# Patient Record
Sex: MALE | Employment: OTHER | URBAN - METROPOLITAN AREA
[De-identification: names, ages, dates, MRNs, and addresses within clinical notes are randomized per-mention and may not be internally consistent; named-entity substitution may affect disease eponyms.]

---

## 2019-02-26 ENCOUNTER — OFFICE VISIT (OUTPATIENT)
Dept: URGENT CARE | Facility: CLINIC | Age: 69
End: 2019-02-26
Payer: MEDICARE

## 2019-02-26 VITALS
WEIGHT: 165 LBS | HEART RATE: 53 BPM | BODY MASS INDEX: 23.62 KG/M2 | TEMPERATURE: 98 F | OXYGEN SATURATION: 97 % | HEIGHT: 70 IN

## 2019-02-26 DIAGNOSIS — Z51.89 VISIT FOR WOUND CHECK: Primary | ICD-10-CM

## 2019-02-26 PROCEDURE — 99204 OFFICE O/P NEW MOD 45 MIN: CPT | Mod: S$GLB,,, | Performed by: PHYSICIAN ASSISTANT

## 2019-02-26 PROCEDURE — 99204 PR OFFICE/OUTPT VISIT, NEW, LEVL IV, 45-59 MIN: ICD-10-PCS | Mod: S$GLB,,, | Performed by: PHYSICIAN ASSISTANT

## 2019-02-26 RX ORDER — LOSARTAN POTASSIUM 50 MG/1
50 TABLET ORAL
COMMUNITY
Start: 2014-12-05 | End: 2019-02-26

## 2019-02-26 RX ORDER — PANTOPRAZOLE SODIUM 40 MG/1
TABLET, DELAYED RELEASE ORAL
Refills: 0 | COMMUNITY
Start: 2019-02-13

## 2019-02-26 RX ORDER — METOPROLOL SUCCINATE 25 MG/1
TABLET, EXTENDED RELEASE ORAL
Refills: 3 | COMMUNITY
Start: 2019-01-06

## 2019-02-26 RX ORDER — AMLODIPINE BESYLATE 10 MG/1
10 TABLET ORAL
COMMUNITY
Start: 2014-12-05

## 2019-02-26 RX ORDER — ATORVASTATIN CALCIUM 80 MG/1
TABLET, FILM COATED ORAL
Refills: 1 | COMMUNITY
Start: 2019-01-28

## 2019-02-26 RX ORDER — LISINOPRIL 10 MG/1
TABLET ORAL
Refills: 3 | COMMUNITY
Start: 2019-01-06

## 2019-02-26 RX ORDER — LEVETIRACETAM 500 MG/1
TABLET ORAL
Refills: 0 | COMMUNITY
Start: 2019-02-23
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
## 2019-02-26 NOTE — PATIENT INSTRUCTIONS
Continue current care.  Apply antibiotic ointment twice daily with dressing changes.  Keep area clean and dry; in the evenings when you return home, remove dressing to let area breathe and dry. You will continue to weep from the hematoma.  Be aware of signs of infection such as worsening pain, worsening swelling, redness to your cheek, foul-smelling or purulent drainage from the site, fever.  Return to this ED if any other problems occur.    Typically facial sutures should be removed after 5 days. You can return to this urgent care tomorrow if you would like to have sutures removed before you make the trip home.      Discharge Instructions: Caring for Your Wound  Taking proper care of your wound will help it heal. Your healthcare provider or nurse may show you specifically how to clean and dress the wound and how to tell if the wound is healing normally. This sheet will help you remember those guidelines when you are at home.  Getting ready  · Put pets and children in another room, away from your work area.  · Wash your hands before touching any of your supplies:  ¨ Turn on the water.  ¨ Wet your hands and wrists.  ¨ Use liquid soap from a pump dispenser. Work up a lather.  ¨ Scrub your hands thoroughly.  ¨ Rinse your hands with your fingers pointing toward the drain.  ¨ Dry your hands with a clean cloth or paper towel. Use this towel to turn off the faucet.  ¨ Remember, once you have washed your hands, dont touch anything other than your supplies. Wash your hands again if you touch anything, like furniture or your clothes.  · Clean your work area:  ¨ Clean washable surfaces with soap and water, and dry with a clean cloth or paper towel.  ¨ Wipe surfaces that are not washable (like fabric or wood) so that they are free of dust. Spread a clean cloth or paper towel over your work surface.  ¨ Move away from the clean surface, if you need to cough or sneeze.  · Gather your bandage supplies:  ¨ Gauze dressing or other  bandage material  ¨ Medical tape  ¨ Disposable gloves  · Wash your hands again.   Dressing the wound  · Remove the old dressing:  ¨ Put on disposable gloves if youre dressing a wound for someone else or if your wound is infected.  ¨ Pull gently toward the wound to loosen the tape.  ¨ One layer at a time, gently remove the dressing.  ¨ If you have a drain or tube, be careful not to pull on it.  ¨ Look at the dressing. Make sure that you are seeing a decreasing amount of blood, and that the blood is turning into a clear, man fluid.  ¨ If your wound has stitches, look for loose ones.  ¨ Put the dressing in a plastic bag. Then remove your gloves.  · Inspect the wound. Look for signs that it isn't healing normally. A wound that isnt healing properly may be dark in color or have white streaks.  · Dress the wound:  ¨ Wash your hands again.  ¨ Clean and dress the wound as you were shown by your healthcare provider or nurse.  ¨ If youre dressing a wound for someone else or if your wound is infected, put on a new pair of disposable gloves .  ¨ If you have a drain or tube, be careful not to pull on it.  · Discard any used materials or trash in a plastic bag before placing in a trash can.  Follow-up  Make a follow-up appointment as directed by our staff.  When to call your healthcare provider  Call your healthcare provider right away if you have any of the following:  · Shaking chills or fever above 100.4°F (38°C)  · Bleeding that soaks the dressing  · Stitches that are pulling away from the wound or pulling apart  · Pink fluid weeping from the wound  · Increased drainage from the wound or drainage that is yellow, yellow-green, or smelly  · Increased swelling, pain, or redness in the skin around the wound  · A change in the color or size of the wound  · Increased fatigue  · Loss of appetite   Date Last Reviewed: 8/5/2015  © 6193-9189 [employer]. [address] [place] [address], [place] [address]. All rights reserved.  This information is not intended as a substitute for professional medical care. Always follow your healthcare professional's instructions.        Suture Care    Stitches (sutures) are used to close wounds. Sutures also help stop bleeding and speed healing. To help your wound heal, follow the tips on this handout.  Some sutures need to be removed by a healthcare provider. Others dissolve on their own. Sometimes strips of tape are used. Youll be told what kind of sutures you have.   Keep sutures clean  · Avoid doing things that could cause dirt or sweat to get on your sutures. If needed, cover your sutures with a bandage (dressing) to protect them.  · Dont pick at scabs. They help protect the wound.  · Dont wash the area around your sutures unless your healthcare provider says its OK. Then, follow his or her instructions for washing and drying.  Keep sutures dry  · Keep your sutures out of water.  · Take a sponge bath to avoid getting your sutures wound wet, unless your healthcare provider tells you otherwise.  · Ask your provider when can you take a shower or bathe.  · Ask your provider about the best way to keep your sutures dry when bathing or showering.  · If sutures get damp, pat them dry.  Changing your dressing  Leave the dressing in place until you are told to remove it or change it. Change it only as directed, using clean hands:  · After the first ___hours, change your dressing every ___hours.  · Change your dressing if it gets wet or dirty.  Other tips  · To help wounds on an arm or leg heal, use the affected limb as little as possible.  · To help reduce swelling and throbbing, raise the area with sutures above your heart.  · To help prevent itching, cover sutures with gauze. If sutures itch, try not to scratch them.  · For pain relief, try acetaminophen or ibuprofen. Dont use aspirin. It can increase bleeding.  When to seek medical care  Call your healthcare provider if you notice any of the following  signs:  · Increased soreness, pain, or tenderness after 24 hours  · A red streak, increased redness, or puffiness near the wound  · White, yellowish, or bad smelling discharge from the wound  · Bleeding that cant be stopped by applying pressure  · Steri-Strips fall off or stitches dissolve before the wound heals  · Fever over 100.4°F (38.0°C)   Date Last Reviewed: 7/1/2016  © 9893-7114 [employer]. [address] [place] [address]. All rights reserved. This information is not intended as a substitute for professional medical care. Always follow your healthcare professional's instructions.

## 2023-04-06 PROBLEM — I50.9 CHF (CONGESTIVE HEART FAILURE) (MULTI): Status: ACTIVE | Noted: 2023-04-06

## 2023-04-06 PROBLEM — N18.31 STAGE 3A CHRONIC KIDNEY DISEASE (MULTI): Status: ACTIVE | Noted: 2023-04-06

## 2023-04-06 PROBLEM — D64.9 ANEMIA: Status: ACTIVE | Noted: 2023-04-06

## 2023-04-06 PROBLEM — M65.331 TRIGGER MIDDLE FINGER OF RIGHT HAND: Status: ACTIVE | Noted: 2023-04-06

## 2023-04-06 PROBLEM — J43.9 EMPHYSEMA/COPD (MULTI): Status: ACTIVE | Noted: 2023-04-06

## 2023-04-06 PROBLEM — R35.1 BPH ASSOCIATED WITH NOCTURIA: Status: ACTIVE | Noted: 2023-04-06

## 2023-04-06 PROBLEM — R30.0 DYSURIA: Status: ACTIVE | Noted: 2023-04-06

## 2023-04-06 PROBLEM — K64.4 EXTERNAL HEMORRHOIDS: Status: ACTIVE | Noted: 2023-04-06

## 2023-04-06 PROBLEM — M25.662 STIFFNESS OF LEFT KNEE, NOT ELSEWHERE CLASSIFIED: Status: ACTIVE | Noted: 2023-04-06

## 2023-04-06 PROBLEM — R97.20 ELEVATED PROSTATE SPECIFIC ANTIGEN (PSA): Status: ACTIVE | Noted: 2023-04-06

## 2023-04-06 PROBLEM — D50.8 IRON DEFICIENCY ANEMIA SECONDARY TO INADEQUATE DIETARY IRON INTAKE: Status: ACTIVE | Noted: 2023-04-06

## 2023-04-06 PROBLEM — E78.5 DYSLIPIDEMIA: Status: ACTIVE | Noted: 2023-04-06

## 2023-04-06 PROBLEM — N52.9 INABILITY TO ATTAIN ERECTION: Status: ACTIVE | Noted: 2023-04-06

## 2023-04-06 PROBLEM — N52.9 MALE ERECTILE DISORDER: Status: ACTIVE | Noted: 2023-04-06

## 2023-04-06 PROBLEM — K30 NUD (NONULCER DYSPEPSIA): Status: ACTIVE | Noted: 2023-04-06

## 2023-04-06 PROBLEM — Z96.1 PSEUDOPHAKIA OF LEFT EYE: Status: ACTIVE | Noted: 2023-04-06

## 2023-04-06 PROBLEM — I10 BENIGN ESSENTIAL HYPERTENSION: Status: ACTIVE | Noted: 2023-04-06

## 2023-04-06 PROBLEM — K21.9 ESOPHAGEAL REFLUX: Status: ACTIVE | Noted: 2023-04-06

## 2023-04-06 PROBLEM — H25.11 AGE-RELATED NUCLEAR CATARACT OF RIGHT EYE: Status: ACTIVE | Noted: 2023-04-06

## 2023-04-06 PROBLEM — I25.3 LEFT VENTRICULAR ANEURYSM: Status: ACTIVE | Noted: 2023-04-06

## 2023-04-06 PROBLEM — R41.3 MEMORY IMPAIRMENT: Status: ACTIVE | Noted: 2023-04-06

## 2023-04-06 PROBLEM — H25.13 NUCLEAR SCLEROSIS OF BOTH EYES: Status: ACTIVE | Noted: 2023-04-06

## 2023-04-06 PROBLEM — Z86.010 HISTORY OF COLON POLYPS: Status: ACTIVE | Noted: 2023-04-06

## 2023-04-06 PROBLEM — Z98.42 STATUS POST CATARACT EXTRACTION AND INSERTION OF INTRAOCULAR LENS OF LEFT EYE: Status: ACTIVE | Noted: 2023-04-06

## 2023-04-06 PROBLEM — I25.10 CAD (CORONARY ARTERY DISEASE): Status: ACTIVE | Noted: 2023-04-06

## 2023-04-06 PROBLEM — H61.23 BILATERAL IMPACTED CERUMEN: Status: ACTIVE | Noted: 2023-04-06

## 2023-04-06 PROBLEM — R33.9 URINARY RETENTION: Status: ACTIVE | Noted: 2023-04-06

## 2023-04-06 PROBLEM — K44.9 HIATAL HERNIA: Status: ACTIVE | Noted: 2023-04-06

## 2023-04-06 PROBLEM — R40.0 DAYTIME SOMNOLENCE: Status: ACTIVE | Noted: 2023-04-06

## 2023-04-06 PROBLEM — H35.363 DRUSEN (DEGENERATIVE) OF MACULA, BILATERAL: Status: ACTIVE | Noted: 2023-04-06

## 2023-04-06 PROBLEM — R63.4 UNINTENTIONAL WEIGHT LOSS: Status: ACTIVE | Noted: 2023-04-06

## 2023-04-06 PROBLEM — G60.9 IDIOPATHIC PERIPHERAL NEUROPATHY: Status: ACTIVE | Noted: 2023-04-06

## 2023-04-06 PROBLEM — K29.70 GASTRITIS: Status: ACTIVE | Noted: 2023-04-06

## 2023-04-06 PROBLEM — R31.9 HEMATURIA: Status: ACTIVE | Noted: 2023-04-06

## 2023-04-06 PROBLEM — K92.1 MELENA: Status: ACTIVE | Noted: 2023-04-06

## 2023-04-06 PROBLEM — K92.2 UPPER GI BLEED: Status: ACTIVE | Noted: 2023-04-06

## 2023-04-06 PROBLEM — S61.219A LACERATION OF FINGER OF LEFT HAND: Status: ACTIVE | Noted: 2023-04-06

## 2023-04-06 PROBLEM — R07.89 RIGHT-SIDED CHEST WALL PAIN: Status: ACTIVE | Noted: 2023-04-06

## 2023-04-06 PROBLEM — M25.562 LEFT KNEE PAIN: Status: ACTIVE | Noted: 2023-04-06

## 2023-04-06 PROBLEM — I10 HYPERTENSION: Status: ACTIVE | Noted: 2023-04-06

## 2023-04-06 PROBLEM — R01.1 HEART MURMUR: Status: ACTIVE | Noted: 2023-04-06

## 2023-04-06 PROBLEM — K27.7 PEPTIC ULCER, CHRONIC: Status: ACTIVE | Noted: 2023-04-06

## 2023-04-06 PROBLEM — M54.50 LOW BACK PAIN: Status: ACTIVE | Noted: 2023-04-06

## 2023-04-06 PROBLEM — R91.8 MULTIPLE PULMONARY NODULES: Status: ACTIVE | Noted: 2023-04-06

## 2023-04-06 PROBLEM — I50.20 HFREF (HEART FAILURE WITH REDUCED EJECTION FRACTION) (MULTI): Status: ACTIVE | Noted: 2023-04-06

## 2023-04-06 PROBLEM — R50.9 FEVER: Status: ACTIVE | Noted: 2023-04-06

## 2023-04-06 PROBLEM — Z86.0100 HISTORY OF COLON POLYPS: Status: ACTIVE | Noted: 2023-04-06

## 2023-04-06 PROBLEM — L28.1 PRURIGO NODULARIS: Status: ACTIVE | Noted: 2023-04-06

## 2023-04-06 PROBLEM — L03.211 CELLULITIS OF FACE: Status: ACTIVE | Noted: 2023-04-06

## 2023-04-06 PROBLEM — R06.09 EXERTIONAL DYSPNEA: Status: ACTIVE | Noted: 2023-04-06

## 2023-04-06 PROBLEM — I34.0 NON-RHEUMATIC MITRAL REGURGITATION: Status: ACTIVE | Noted: 2023-04-06

## 2023-04-06 PROBLEM — R35.1 NOCTURIA: Status: ACTIVE | Noted: 2023-04-06

## 2023-04-06 PROBLEM — Z96.1 STATUS POST CATARACT EXTRACTION AND INSERTION OF INTRAOCULAR LENS OF LEFT EYE: Status: ACTIVE | Noted: 2023-04-06

## 2023-04-06 PROBLEM — N40.1 BPH ASSOCIATED WITH NOCTURIA: Status: ACTIVE | Noted: 2023-04-06

## 2023-04-06 PROBLEM — N20.0 KIDNEY STONE ON LEFT SIDE: Status: ACTIVE | Noted: 2023-04-06

## 2023-04-06 PROBLEM — K25.9 GASTRIC ULCER: Status: ACTIVE | Noted: 2023-04-06

## 2023-04-06 PROBLEM — I21.4 NON-ST ELEVATION MYOCARDIAL INFARCTION (NSTEMI) IN RECOVERY PHASE (MULTI): Status: ACTIVE | Noted: 2023-04-06

## 2023-04-06 PROBLEM — M79.604 PAIN OF RIGHT LOWER EXTREMITY: Status: ACTIVE | Noted: 2023-04-06

## 2023-04-06 PROBLEM — R31.29 MICROSCOPIC HEMATURIA: Status: ACTIVE | Noted: 2023-04-06

## 2023-04-06 PROBLEM — S09.90XA INJURY OF HEAD: Status: ACTIVE | Noted: 2023-04-06

## 2023-04-06 PROBLEM — R20.2 PARESTHESIA OF BOTH FEET: Status: ACTIVE | Noted: 2023-04-06

## 2023-04-06 PROBLEM — S39.012A LOW BACK STRAIN, INITIAL ENCOUNTER: Status: ACTIVE | Noted: 2023-04-06

## 2023-04-06 PROBLEM — H26.499 AFTER-CATARACT WITH VISION OBSCURED: Status: ACTIVE | Noted: 2023-04-06

## 2023-04-06 PROBLEM — R29.898 MUSCULAR DECONDITIONING: Status: ACTIVE | Noted: 2023-04-06

## 2023-04-06 PROBLEM — Z95.810 AICD (AUTOMATIC CARDIOVERTER/DEFIBRILLATOR) PRESENT: Status: ACTIVE | Noted: 2023-04-06

## 2023-04-06 PROBLEM — Z96.1 PSEUDOPHAKIA OF RIGHT EYE: Status: ACTIVE | Noted: 2023-04-06

## 2023-04-06 PROBLEM — S01.81XA FACIAL LACERATION: Status: ACTIVE | Noted: 2023-04-06

## 2023-04-06 PROBLEM — H26.492 POSTERIOR CAPSULAR OPACIFICATION NON VISUALLY SIGNIFICANT OF LEFT EYE: Status: ACTIVE | Noted: 2023-04-06

## 2023-04-06 PROBLEM — D31.31 CHOROIDAL NEVUS OF RIGHT EYE: Status: ACTIVE | Noted: 2023-04-06

## 2023-04-06 PROBLEM — R25.2 MUSCLE CRAMPS: Status: ACTIVE | Noted: 2023-04-06

## 2023-04-06 PROBLEM — F40.9 FEAR FOR PERSONAL SAFETY: Status: ACTIVE | Noted: 2023-04-06

## 2023-04-06 PROBLEM — H25.011 CORTICAL AGE-RELATED CATARACT OF RIGHT EYE: Status: ACTIVE | Noted: 2023-04-06

## 2023-04-06 PROBLEM — K27.9 PUD (PEPTIC ULCER DISEASE): Status: ACTIVE | Noted: 2023-04-06

## 2023-04-06 PROBLEM — J18.9 COMMUNITY ACQUIRED PNEUMONIA: Status: ACTIVE | Noted: 2023-04-06

## 2023-04-06 PROBLEM — G47.10 EXCESSIVE SLEEPINESS: Status: ACTIVE | Noted: 2023-04-06

## 2023-04-06 PROBLEM — R80.9 PROTEINURIA: Status: ACTIVE | Noted: 2023-04-06

## 2023-04-06 RX ORDER — CLOPIDOGREL BISULFATE 75 MG/1
75 TABLET ORAL DAILY
COMMUNITY
End: 2024-03-05 | Stop reason: SDUPTHER

## 2023-04-06 RX ORDER — CARVEDILOL 6.25 MG/1
6.25 TABLET ORAL 2 TIMES DAILY
COMMUNITY
End: 2023-05-15 | Stop reason: SDUPTHER

## 2023-04-06 RX ORDER — ROSUVASTATIN CALCIUM 40 MG/1
40 TABLET, COATED ORAL DAILY
COMMUNITY
End: 2023-06-15 | Stop reason: SDUPTHER

## 2023-04-06 RX ORDER — ACETAMINOPHEN 500 MG
50 TABLET ORAL DAILY
COMMUNITY
Start: 2021-08-31 | End: 2023-09-15 | Stop reason: ALTCHOICE

## 2023-04-06 RX ORDER — SACUBITRIL AND VALSARTAN 97; 103 MG/1; MG/1
1 TABLET, FILM COATED ORAL 2 TIMES DAILY
COMMUNITY
End: 2023-07-10 | Stop reason: SDUPTHER

## 2023-04-06 RX ORDER — EMPAGLIFLOZIN 10 MG/1
1 TABLET, FILM COATED ORAL DAILY
COMMUNITY
Start: 2023-01-28 | End: 2023-06-15 | Stop reason: SINTOL

## 2023-04-06 RX ORDER — ASPIRIN 81 MG/1
1 TABLET ORAL DAILY
COMMUNITY
Start: 2015-07-29

## 2023-04-07 ENCOUNTER — OFFICE VISIT (OUTPATIENT)
Dept: PRIMARY CARE | Facility: CLINIC | Age: 73
End: 2023-04-07
Payer: MEDICARE

## 2023-04-07 VITALS
TEMPERATURE: 97.1 F | OXYGEN SATURATION: 97 % | WEIGHT: 159 LBS | SYSTOLIC BLOOD PRESSURE: 132 MMHG | HEIGHT: 69 IN | DIASTOLIC BLOOD PRESSURE: 63 MMHG | BODY MASS INDEX: 23.55 KG/M2 | RESPIRATION RATE: 12 BRPM | HEART RATE: 74 BPM

## 2023-04-07 DIAGNOSIS — H61.23 BILATERAL IMPACTED CERUMEN: Primary | ICD-10-CM

## 2023-04-07 PROCEDURE — 99211 OFF/OP EST MAY X REQ PHY/QHP: CPT | Performed by: FAMILY MEDICINE

## 2023-04-07 PROCEDURE — 1159F MED LIST DOCD IN RCRD: CPT | Performed by: FAMILY MEDICINE

## 2023-04-07 PROCEDURE — 3075F SYST BP GE 130 - 139MM HG: CPT | Performed by: FAMILY MEDICINE

## 2023-04-07 PROCEDURE — 1157F ADVNC CARE PLAN IN RCRD: CPT | Performed by: FAMILY MEDICINE

## 2023-04-07 PROCEDURE — 1036F TOBACCO NON-USER: CPT | Performed by: FAMILY MEDICINE

## 2023-04-07 PROCEDURE — 3078F DIAST BP <80 MM HG: CPT | Performed by: FAMILY MEDICINE

## 2023-04-07 ASSESSMENT — ENCOUNTER SYMPTOMS
OCCASIONAL FEELINGS OF UNSTEADINESS: 0
LOSS OF SENSATION IN FEET: 0
DEPRESSION: 0

## 2023-04-07 NOTE — PROGRESS NOTES
"Subjective   Patient ID: Wojciech Kern is a 72 y.o. male who presents for Cerumen Impaction.    HPI     Review of Systems    Objective   /63   Pulse 74   Temp 36.2 °C (97.1 °F)   Resp 12   Ht 1.753 m (5' 9\")   Wt 72.1 kg (159 lb)   SpO2 97%   BMI 23.48 kg/m²     Physical Exam    Assessment/Plan          "

## 2023-05-09 ENCOUNTER — TELEPHONE (OUTPATIENT)
Dept: PHARMACY | Facility: HOSPITAL | Age: 73
End: 2023-05-09
Payer: MEDICARE

## 2023-05-09 NOTE — TELEPHONE ENCOUNTER
Patient called back and left detailed message for me despite my leaving a generic voicemail. States he stopped taking the Jardiance due to experiencing dizziness and will discuss with cardiologist at his next appointment (on 7/24/23 per [facility]).     Patient reports in his message he has received similar calls to mine several times and wishes not to receive them, so I did not call him back to discuss the Jardiance further.

## 2023-05-09 NOTE — TELEPHONE ENCOUNTER
Payor:  Terry  PCP: Helio Sparks MD  Adherence  Dx:  Heart failure    Med: Jardiance 10mg  Last filled: 1/29/23  Next fill due: 3/5/23  Notes: left voicemail, patient managed by cardiology for this medication. Appears was given full year worth of refills  Action: if patient calls back, see if any barriers for refilling this med    Alicia Campos, SamiD

## 2023-05-15 DIAGNOSIS — I10 BENIGN ESSENTIAL HYPERTENSION: Primary | ICD-10-CM

## 2023-05-15 RX ORDER — CARVEDILOL 6.25 MG/1
6.25 TABLET ORAL 2 TIMES DAILY
Qty: 180 TABLET | Refills: 0 | Status: SHIPPED | OUTPATIENT
Start: 2023-05-15 | End: 2024-03-05 | Stop reason: ALTCHOICE

## 2023-05-15 NOTE — TELEPHONE ENCOUNTER
Patient called in and needs a refill of the follow medication    Carvedilol 6.25mg  Pharmacy [facility] in chart

## 2023-06-12 ENCOUNTER — APPOINTMENT (OUTPATIENT)
Dept: PRIMARY CARE | Facility: CLINIC | Age: 73
End: 2023-06-12
Payer: MEDICARE

## 2023-06-15 ENCOUNTER — OFFICE VISIT (OUTPATIENT)
Dept: PRIMARY CARE | Facility: CLINIC | Age: 73
End: 2023-06-15
Payer: MEDICARE

## 2023-06-15 VITALS
BODY MASS INDEX: 23.55 KG/M2 | OXYGEN SATURATION: 97 % | HEART RATE: 42 BPM | HEIGHT: 69 IN | DIASTOLIC BLOOD PRESSURE: 68 MMHG | SYSTOLIC BLOOD PRESSURE: 116 MMHG | RESPIRATION RATE: 16 BRPM | WEIGHT: 159 LBS | TEMPERATURE: 97.2 F

## 2023-06-15 DIAGNOSIS — J43.9 PULMONARY EMPHYSEMA, UNSPECIFIED EMPHYSEMA TYPE (MULTI): ICD-10-CM

## 2023-06-15 DIAGNOSIS — Z00.00 ROUTINE GENERAL MEDICAL EXAMINATION AT A HEALTH CARE FACILITY: Primary | ICD-10-CM

## 2023-06-15 DIAGNOSIS — R26.81 UNSTEADY GAIT: ICD-10-CM

## 2023-06-15 DIAGNOSIS — R25.2 NOCTURNAL MUSCLE CRAMP: ICD-10-CM

## 2023-06-15 DIAGNOSIS — E78.5 DYSLIPIDEMIA: ICD-10-CM

## 2023-06-15 DIAGNOSIS — Z12.5 SCREENING FOR PROSTATE CANCER: ICD-10-CM

## 2023-06-15 PROBLEM — M54.50 LOW BACK PAIN: Status: RESOLVED | Noted: 2023-04-06 | Resolved: 2023-06-15

## 2023-06-15 PROBLEM — K30 NUD (NONULCER DYSPEPSIA): Status: RESOLVED | Noted: 2023-04-06 | Resolved: 2023-06-15

## 2023-06-15 PROBLEM — S39.012A LOW BACK STRAIN, INITIAL ENCOUNTER: Status: RESOLVED | Noted: 2023-04-06 | Resolved: 2023-06-15

## 2023-06-15 PROBLEM — M25.562 LEFT KNEE PAIN: Status: RESOLVED | Noted: 2023-04-06 | Resolved: 2023-06-15

## 2023-06-15 PROBLEM — M65.331 TRIGGER MIDDLE FINGER OF RIGHT HAND: Status: RESOLVED | Noted: 2023-04-06 | Resolved: 2023-06-15

## 2023-06-15 PROBLEM — R07.89 RIGHT-SIDED CHEST WALL PAIN: Status: RESOLVED | Noted: 2023-04-06 | Resolved: 2023-06-15

## 2023-06-15 PROBLEM — M79.604 PAIN OF RIGHT LOWER EXTREMITY: Status: RESOLVED | Noted: 2023-04-06 | Resolved: 2023-06-15

## 2023-06-15 PROBLEM — N20.0 KIDNEY STONE ON LEFT SIDE: Status: RESOLVED | Noted: 2023-04-06 | Resolved: 2023-06-15

## 2023-06-15 PROBLEM — R20.2 PARESTHESIA OF BOTH FEET: Status: RESOLVED | Noted: 2023-04-06 | Resolved: 2023-06-15

## 2023-06-15 PROBLEM — G47.10 EXCESSIVE SLEEPINESS: Status: RESOLVED | Noted: 2023-04-06 | Resolved: 2023-06-15

## 2023-06-15 PROBLEM — J18.9 COMMUNITY ACQUIRED PNEUMONIA: Status: RESOLVED | Noted: 2023-04-06 | Resolved: 2023-06-15

## 2023-06-15 PROBLEM — R63.4 UNINTENTIONAL WEIGHT LOSS: Status: RESOLVED | Noted: 2023-04-06 | Resolved: 2023-06-15

## 2023-06-15 PROBLEM — S01.81XA FACIAL LACERATION: Status: RESOLVED | Noted: 2023-04-06 | Resolved: 2023-06-15

## 2023-06-15 PROBLEM — S09.90XA INJURY OF HEAD: Status: RESOLVED | Noted: 2023-04-06 | Resolved: 2023-06-15

## 2023-06-15 PROBLEM — F40.9 FEAR FOR PERSONAL SAFETY: Status: RESOLVED | Noted: 2023-04-06 | Resolved: 2023-06-15

## 2023-06-15 PROBLEM — K29.70 GASTRITIS: Status: RESOLVED | Noted: 2023-04-06 | Resolved: 2023-06-15

## 2023-06-15 PROBLEM — R50.9 FEVER: Status: RESOLVED | Noted: 2023-04-06 | Resolved: 2023-06-15

## 2023-06-15 PROBLEM — K92.1 MELENA: Status: RESOLVED | Noted: 2023-04-06 | Resolved: 2023-06-15

## 2023-06-15 PROBLEM — K92.2 UPPER GI BLEED: Status: RESOLVED | Noted: 2023-04-06 | Resolved: 2023-06-15

## 2023-06-15 PROBLEM — I50.9 CHF (CONGESTIVE HEART FAILURE) (MULTI): Status: RESOLVED | Noted: 2023-04-06 | Resolved: 2023-06-15

## 2023-06-15 PROBLEM — R33.9 URINARY RETENTION: Status: RESOLVED | Noted: 2023-04-06 | Resolved: 2023-06-15

## 2023-06-15 PROBLEM — N52.9 MALE ERECTILE DISORDER: Status: RESOLVED | Noted: 2023-04-06 | Resolved: 2023-06-15

## 2023-06-15 PROBLEM — S61.219A LACERATION OF FINGER OF LEFT HAND: Status: RESOLVED | Noted: 2023-04-06 | Resolved: 2023-06-15

## 2023-06-15 PROBLEM — M25.662 STIFFNESS OF LEFT KNEE, NOT ELSEWHERE CLASSIFIED: Status: RESOLVED | Noted: 2023-04-06 | Resolved: 2023-06-15

## 2023-06-15 PROBLEM — R30.0 DYSURIA: Status: RESOLVED | Noted: 2023-04-06 | Resolved: 2023-06-15

## 2023-06-15 PROBLEM — L28.1 PRURIGO NODULARIS: Status: RESOLVED | Noted: 2023-04-06 | Resolved: 2023-06-15

## 2023-06-15 PROBLEM — K27.9 PUD (PEPTIC ULCER DISEASE): Status: RESOLVED | Noted: 2023-04-06 | Resolved: 2023-06-15

## 2023-06-15 PROCEDURE — 1170F FXNL STATUS ASSESSED: CPT | Performed by: FAMILY MEDICINE

## 2023-06-15 PROCEDURE — 3074F SYST BP LT 130 MM HG: CPT | Performed by: FAMILY MEDICINE

## 2023-06-15 PROCEDURE — G0439 PPPS, SUBSEQ VISIT: HCPCS | Performed by: FAMILY MEDICINE

## 2023-06-15 PROCEDURE — 1159F MED LIST DOCD IN RCRD: CPT | Performed by: FAMILY MEDICINE

## 2023-06-15 PROCEDURE — 1157F ADVNC CARE PLAN IN RCRD: CPT | Performed by: FAMILY MEDICINE

## 2023-06-15 PROCEDURE — 3078F DIAST BP <80 MM HG: CPT | Performed by: FAMILY MEDICINE

## 2023-06-15 PROCEDURE — 1160F RVW MEDS BY RX/DR IN RCRD: CPT | Performed by: FAMILY MEDICINE

## 2023-06-15 PROCEDURE — 1036F TOBACCO NON-USER: CPT | Performed by: FAMILY MEDICINE

## 2023-06-15 RX ORDER — ALBUTEROL SULFATE 90 UG/1
2 AEROSOL, METERED RESPIRATORY (INHALATION) EVERY 4 HOURS PRN
Qty: 8.5 G | Refills: 0 | Status: SHIPPED | OUTPATIENT
Start: 2023-06-15 | End: 2023-11-21 | Stop reason: SDUPTHER

## 2023-06-15 RX ORDER — ROSUVASTATIN CALCIUM 20 MG/1
20 TABLET, COATED ORAL DAILY
Qty: 90 TABLET | Refills: 0 | Status: SHIPPED | OUTPATIENT
Start: 2023-06-15 | End: 2023-09-11 | Stop reason: DRUGHIGH

## 2023-06-15 ASSESSMENT — ENCOUNTER SYMPTOMS
COUGH: 0
LOSS OF SENSATION IN FEET: 0
BLOOD IN STOOL: 0
FATIGUE: 0
EYE PAIN: 0
BACK PAIN: 0
DYSURIA: 0
CHEST TIGHTNESS: 0
SORE THROAT: 0
ABDOMINAL PAIN: 0
ADENOPATHY: 0
DEPRESSION: 0
EYE REDNESS: 0
DIZZINESS: 0
SHORTNESS OF BREATH: 1
BRUISES/BLEEDS EASILY: 0
APPETITE CHANGE: 0
DIFFICULTY URINATING: 0
DIARRHEA: 0
NERVOUS/ANXIOUS: 0
ARTHRALGIAS: 0
WEAKNESS: 0
DYSPHORIC MOOD: 0
OCCASIONAL FEELINGS OF UNSTEADINESS: 1
ABDOMINAL DISTENTION: 0
CHILLS: 0
FEVER: 0
CONSTIPATION: 0
HEADACHES: 0

## 2023-06-15 ASSESSMENT — ACTIVITIES OF DAILY LIVING (ADL)
TAKING_MEDICATION: INDEPENDENT
DRESSING: INDEPENDENT
GROCERY_SHOPPING: INDEPENDENT
DOING_HOUSEWORK: INDEPENDENT
BATHING: INDEPENDENT
MANAGING_FINANCES: INDEPENDENT

## 2023-06-15 ASSESSMENT — PATIENT HEALTH QUESTIONNAIRE - PHQ9
1. LITTLE INTEREST OR PLEASURE IN DOING THINGS: NOT AT ALL
SUM OF ALL RESPONSES TO PHQ9 QUESTIONS 1 AND 2: 0
SUM OF ALL RESPONSES TO PHQ9 QUESTIONS 1 AND 2: 0
2. FEELING DOWN, DEPRESSED OR HOPELESS: NOT AT ALL
2. FEELING DOWN, DEPRESSED OR HOPELESS: NOT AT ALL
1. LITTLE INTEREST OR PLEASURE IN DOING THINGS: NOT AT ALL

## 2023-06-15 NOTE — PROGRESS NOTES
Subjective   Patient ID: Wojciech Kern is a 73 y.o. male who presents for Medicare Annual Wellness Visit Subsequent.  PMHX, PSHx, Fam hx, and Social hx reviewed.   New concerns - he brings up fatigue he attibutes to nocturnal leg cramps. He is on statin but hasn't changed dose.   Vaccines Prevnar ( had Pneumovax in Nov 22) and Shingrix are due  Dentist seen at least yearly yes  Vision concerns - following with specialist for mac degeneration  Hearing concerns - ok with hearing aids  Diet is usually  overall healthy.   Smoker - no  Alcohol use - 1-2 drinks per week  Exercising 7 days per week.   Colonoscopy 2018, due again in October       Medicare Wellness Billing Compliance Satisfied    *This is a visual tool to show completion of required items on the day of the visit. Green checks will only appear on the date of visit.    Review all medications by prescribing practitioner or clinical pharmacist (such as prescriptions, OTCs, herbal therapies and supplements) documented in the medical record    Past Medical, Surgical, and Family History reviewed and updated in chart    Tobacco Use Reviewed    Alcohol Use Reviewed    Illicit Drug Use Reviewed    PHQ2/9    Falls in Last Year Reviewed    Home Safety Risk Factors Reviewed    Cognitive Impairment Reviewed    Patient Self Assessment and Health Status    Current Diet Reviewed    Exercise Frequency    ADL - Hearing Impairment    ADL - Bathing    ADL - Dressing    ADL - Walks in Home    IADL - Managing Finances    IADL - Grocery Shopping    IADL - Taking Medications    IADL - Doing Housework        Review of Systems   Constitutional:  Negative for appetite change, chills, fatigue and fever.   HENT:  Negative for congestion, hearing loss and sore throat.    Eyes:  Negative for pain, redness and visual disturbance.   Respiratory:  Positive for shortness of breath (rarely with COPD, Albuterol helps. Needs refill). Negative for cough and chest tightness.   "  Cardiovascular:  Negative for chest pain and leg swelling.   Gastrointestinal:  Negative for abdominal distention, abdominal pain, blood in stool, constipation and diarrhea.   Genitourinary:  Negative for difficulty urinating and dysuria.   Musculoskeletal:  Positive for gait problem. Negative for arthralgias and back pain.   Skin:  Negative for rash.   Neurological:  Negative for dizziness, weakness and headaches.   Hematological:  Negative for adenopathy. Does not bruise/bleed easily.   Psychiatric/Behavioral:  Negative for dysphoric mood. The patient is not nervous/anxious.        Objective   /68   Pulse (!) 42   Temp 36.2 °C (97.2 °F)   Resp 16   Ht 1.753 m (5' 9\")   Wt 72.1 kg (159 lb)   SpO2 97%   BMI 23.48 kg/m²    Physical Exam  Constitutional:       General: He is not in acute distress.     Appearance: Normal appearance. He is not ill-appearing.   HENT:      Head: Normocephalic and atraumatic.      Right Ear: Tympanic membrane, ear canal and external ear normal.      Left Ear: Tympanic membrane, ear canal and external ear normal.      Nose: Nose normal.      Mouth/Throat:      Mouth: Mucous membranes are moist.      Pharynx: No oropharyngeal exudate or posterior oropharyngeal erythema.   Eyes:      Extraocular Movements: Extraocular movements intact.      Conjunctiva/sclera: Conjunctivae normal.      Pupils: Pupils are equal, round, and reactive to light.   Neck:      Vascular: No carotid bruit.   Cardiovascular:      Rate and Rhythm: Normal rate and regular rhythm.      Heart sounds: Normal heart sounds. No murmur heard.  Pulmonary:      Breath sounds: Normal breath sounds. No wheezing, rhonchi or rales.   Abdominal:      General: Bowel sounds are normal. There is no distension.      Palpations: Abdomen is soft. There is no mass.      Tenderness: There is no abdominal tenderness.   Musculoskeletal:         General: No swelling or deformity.      Cervical back: Neck supple. No tenderness. "   Lymphadenopathy:      Cervical: No cervical adenopathy.   Skin:     General: Skin is warm and dry.      Findings: No lesion or rash.   Neurological:      Mental Status: He is alert and oriented to person, place, and time.      Sensory: No sensory deficit.      Motor: No weakness.      Coordination: Coordination normal.      Deep Tendon Reflexes: Reflexes normal.   Psychiatric:         Mood and Affect: Mood normal.         Behavior: Behavior normal.         Judgment: Judgment normal.           Assessment/Plan   Diagnoses and all orders for this visit:  Routine general medical examination at a health care facility - Shingles vaccine recommended at pharmacy. Prevnar is due in November. Recheck labs. Colonoscopy due in October  Nocturnal muscle cramp - will try cutting back Rosuvastatin to 20mg, continue Bar of Soap remedy  Unsteady gait - referring for physical therapy.  -     Referral to Physical Therapy; Future  Screening for prostate cancer  -     Prostate Specific Antigen, Screen; Future  Pulmonary emphysema, unspecified emphysema type (CMS/HCC)  -     albuterol (ProAir HFA) 90 mcg/actuation inhaler; Inhale 2 puffs every 4 hours if needed for wheezing or shortness of breath.  Dyslipidemia -  -     rosuvastatin (Crestor) 20 mg tablet; Take 1 tablet (20 mg) by mouth once daily.  -     Comprehensive Metabolic Panel; Future  -     Lipid Panel; Future    Follow up in 3months, 30mins

## 2023-06-15 NOTE — PROGRESS NOTES
Subjective   Reason for Visit: Wojciech Kern is an 73 y.o. male here for a Medicare Wellness visit.     Past Medical, Surgical, and Family History reviewed and updated in chart.    Reviewed all medications by prescribing practitioner or clinical pharmacist (such as prescriptions, OTCs, herbal therapies and supplements) and documented in the medical record.    HPI    Patient Care Team:  Helio Sparks MD as PCP - General  Helio Sparks MD as PCP - [employer] PCP     Review of Systems    Objective   Vitals:  There were no vitals taken for this visit.      Physical Exam    Assessment/Plan   Problem List Items Addressed This Visit    None

## 2023-06-15 NOTE — PATIENT INSTRUCTIONS

## 2023-06-21 ENCOUNTER — LAB (OUTPATIENT)
Dept: LAB | Facility: LAB | Age: 73
End: 2023-06-21
Payer: MEDICARE

## 2023-06-21 DIAGNOSIS — Z12.5 SCREENING FOR PROSTATE CANCER: ICD-10-CM

## 2023-06-21 DIAGNOSIS — E78.5 DYSLIPIDEMIA: ICD-10-CM

## 2023-06-21 LAB
ALANINE AMINOTRANSFERASE (SGPT) (U/L) IN SER/PLAS: 13 U/L (ref 10–52)
ALBUMIN (G/DL) IN SER/PLAS: 4.1 G/DL (ref 3.4–5)
ALKALINE PHOSPHATASE (U/L) IN SER/PLAS: 52 U/L (ref 33–136)
ANION GAP IN SER/PLAS: 12 MMOL/L (ref 10–20)
ASPARTATE AMINOTRANSFERASE (SGOT) (U/L) IN SER/PLAS: 14 U/L (ref 9–39)
BILIRUBIN TOTAL (MG/DL) IN SER/PLAS: 0.8 MG/DL (ref 0–1.2)
CALCIUM (MG/DL) IN SER/PLAS: 9.4 MG/DL (ref 8.6–10.6)
CARBON DIOXIDE, TOTAL (MMOL/L) IN SER/PLAS: 27 MMOL/L (ref 21–32)
CHLORIDE (MMOL/L) IN SER/PLAS: 105 MMOL/L (ref 98–107)
CHOLESTEROL (MG/DL) IN SER/PLAS: 107 MG/DL (ref 0–199)
CHOLESTEROL IN HDL (MG/DL) IN SER/PLAS: 46 MG/DL
CHOLESTEROL/HDL RATIO: 2.3
CREATININE (MG/DL) IN SER/PLAS: 1.53 MG/DL (ref 0.5–1.3)
GFR MALE: 47 ML/MIN/1.73M2
GLUCOSE (MG/DL) IN SER/PLAS: 102 MG/DL (ref 74–99)
LDL: 52 MG/DL (ref 0–99)
POTASSIUM (MMOL/L) IN SER/PLAS: 4.3 MMOL/L (ref 3.5–5.3)
PROSTATE SPECIFIC ANTIGEN,SCREEN: 0.39 NG/ML (ref 0–4)
PROTEIN TOTAL: 7.6 G/DL (ref 6.4–8.2)
SODIUM (MMOL/L) IN SER/PLAS: 140 MMOL/L (ref 136–145)
TRIGLYCERIDE (MG/DL) IN SER/PLAS: 43 MG/DL (ref 0–149)
UREA NITROGEN (MG/DL) IN SER/PLAS: 35 MG/DL (ref 6–23)
VLDL: 9 MG/DL (ref 0–40)

## 2023-06-21 PROCEDURE — 80061 LIPID PANEL: CPT

## 2023-06-21 PROCEDURE — 36415 COLL VENOUS BLD VENIPUNCTURE: CPT

## 2023-06-21 PROCEDURE — 80053 COMPREHEN METABOLIC PANEL: CPT

## 2023-06-21 PROCEDURE — G0103 PSA SCREENING: HCPCS

## 2023-06-22 ENCOUNTER — TELEPHONE (OUTPATIENT)
Dept: PRIMARY CARE | Facility: CLINIC | Age: 73
End: 2023-06-22
Payer: MEDICARE

## 2023-06-22 NOTE — TELEPHONE ENCOUNTER
----- Message from Helio Sparks MD sent at 6/21/2023  9:21 PM EDT -----  FBS mildly elevated, kidney function low but within his baseline. Other labs look good.   ----- Message -----  From: Lab, Background User  Sent: 6/21/2023   4:16 PM EDT  To: Helio Sparks MD

## 2023-07-05 RX ORDER — SACUBITRIL AND VALSARTAN 97; 103 MG/1; MG/1
1 TABLET, FILM COATED ORAL 2 TIMES DAILY
Qty: 180 TABLET | Refills: 0 | OUTPATIENT
Start: 2023-07-05 | End: 2024-07-04

## 2023-07-10 DIAGNOSIS — I10 PRIMARY HYPERTENSION: Primary | ICD-10-CM

## 2023-07-10 RX ORDER — SACUBITRIL AND VALSARTAN 97; 103 MG/1; MG/1
1 TABLET, FILM COATED ORAL 2 TIMES DAILY
Qty: 60 TABLET | Refills: 0 | Status: SHIPPED | OUTPATIENT
Start: 2023-07-10 | End: 2024-03-01 | Stop reason: SDUPTHER

## 2023-08-25 ENCOUNTER — PATIENT OUTREACH (OUTPATIENT)
Dept: CARE COORDINATION | Facility: CLINIC | Age: 73
End: 2023-08-25
Payer: MEDICARE

## 2023-08-25 NOTE — PROGRESS NOTES
Discharge Facility:[facility]  Discharge Diagnosis:pneumonia  Admission Date:08/20/23  Discharge Date: 08/24/23    PCP Appointment Date:08/31/23  Specialist Appointment Date:   Hospital Encounter and Summary: Linked   See discharge assessment below for further details  Engagement  Call Start Time: 0826 (8/25/2023  8:26 AM)    Medications  Medications reviewed with patient/caregiver?: Yes (new meds only ciprofloxacin 750mg and azithromycin 250mg) (8/25/2023  8:26 AM)  Is the patient having any side effects they believe may be caused by any medication additions or changes?: No (8/25/2023  8:26 AM)  Does the patient have all medications ordered at discharge?: Yes (8/25/2023  8:26 AM)  Is the patient taking all medications as directed (includes completed medication regime)?: Yes (8/25/2023  8:26 AM)    Appointments  Does the patient have a primary care provider?: Yes (8/25/2023  8:26 AM)  Care Management Interventions: Verified appointment date/time/provider (8/25/2023  8:26 AM)    Self Management  Has home health visited the patient within 72 hours of discharge?: Not applicable (8/25/2023  8:26 AM)  Has all Durable Medical Equipment (DME) been delivered?: No (8/25/2023  8:26 AM)    Patient Teaching  Care Management Interventions: Reviewed instructions with patient (8/25/2023  8:26 AM)  What is the patient's perception of their health status since discharge?: Improving (8/25/2023  8:26 AM)    Wrap Up  Call End Time: 0840 (8/25/2023  8:26 AM)

## 2023-08-31 ENCOUNTER — OFFICE VISIT (OUTPATIENT)
Dept: PRIMARY CARE | Facility: CLINIC | Age: 73
End: 2023-08-31
Payer: MEDICARE

## 2023-08-31 VITALS
WEIGHT: 157 LBS | BODY MASS INDEX: 23.25 KG/M2 | HEART RATE: 66 BPM | HEIGHT: 69 IN | RESPIRATION RATE: 12 BRPM | DIASTOLIC BLOOD PRESSURE: 52 MMHG | OXYGEN SATURATION: 97 % | SYSTOLIC BLOOD PRESSURE: 112 MMHG

## 2023-08-31 DIAGNOSIS — J18.9 PNEUMONIA OF BOTH LOWER LOBES DUE TO INFECTIOUS ORGANISM: Primary | ICD-10-CM

## 2023-08-31 DIAGNOSIS — D64.9 ANEMIA, UNSPECIFIED TYPE: ICD-10-CM

## 2023-08-31 DIAGNOSIS — E87.8 ELECTROLYTE ABNORMALITY: ICD-10-CM

## 2023-08-31 DIAGNOSIS — N18.31 STAGE 3A CHRONIC KIDNEY DISEASE (MULTI): ICD-10-CM

## 2023-08-31 DIAGNOSIS — J43.9 PULMONARY EMPHYSEMA, UNSPECIFIED EMPHYSEMA TYPE (MULTI): ICD-10-CM

## 2023-08-31 DIAGNOSIS — F51.02 INSOMNIA, TRANSIENT: ICD-10-CM

## 2023-08-31 PROCEDURE — 1160F RVW MEDS BY RX/DR IN RCRD: CPT | Performed by: FAMILY MEDICINE

## 2023-08-31 PROCEDURE — 1157F ADVNC CARE PLAN IN RCRD: CPT | Performed by: FAMILY MEDICINE

## 2023-08-31 PROCEDURE — 1036F TOBACCO NON-USER: CPT | Performed by: FAMILY MEDICINE

## 2023-08-31 PROCEDURE — 99495 TRANSJ CARE MGMT MOD F2F 14D: CPT | Performed by: FAMILY MEDICINE

## 2023-08-31 PROCEDURE — 3074F SYST BP LT 130 MM HG: CPT | Performed by: FAMILY MEDICINE

## 2023-08-31 PROCEDURE — 3078F DIAST BP <80 MM HG: CPT | Performed by: FAMILY MEDICINE

## 2023-08-31 PROCEDURE — 1126F AMNT PAIN NOTED NONE PRSNT: CPT | Performed by: FAMILY MEDICINE

## 2023-08-31 PROCEDURE — 1159F MED LIST DOCD IN RCRD: CPT | Performed by: FAMILY MEDICINE

## 2023-08-31 RX ORDER — RAMELTEON 8 MG/1
8 TABLET ORAL NIGHTLY PRN
Qty: 30 TABLET | Refills: 0 | Status: SHIPPED | OUTPATIENT
Start: 2023-08-31 | End: 2023-09-11 | Stop reason: SINTOL

## 2023-08-31 ASSESSMENT — ENCOUNTER SYMPTOMS
SORE THROAT: 0
FEVER: 0
HEADACHES: 0
DYSPHORIC MOOD: 0
DIZZINESS: 0
FATIGUE: 1
NERVOUS/ANXIOUS: 0
ARTHRALGIAS: 0
CHEST TIGHTNESS: 0
DIARRHEA: 0
SLEEP DISTURBANCE: 1
EYE PAIN: 0
EYE REDNESS: 0
ABDOMINAL PAIN: 0
COUGH: 1
DIFFICULTY URINATING: 0
BACK PAIN: 0
WEAKNESS: 1
SHORTNESS OF BREATH: 0
CHILLS: 0
ADENOPATHY: 0
BLOOD IN STOOL: 0
APPETITE CHANGE: 0
ABDOMINAL DISTENTION: 0
DYSURIA: 0
BRUISES/BLEEDS EASILY: 0
CONSTIPATION: 0

## 2023-08-31 NOTE — PROGRESS NOTES
"Subjective   Patient ID: Wojciech Kern is a 73 y.o. male who presents for Hospital Follow-up (Pneumonia).    HPI     Review of Systems    Objective   /52   Pulse 66   Resp 12   Ht 1.753 m (5' 9\")   Wt 71.2 kg (157 lb)   SpO2 97%   BMI 23.18 kg/m²     Physical Exam    Assessment/Plan          "

## 2023-08-31 NOTE — PROGRESS NOTES
Subjective   Patient ID: Wojciech Kern is a 73 y.o. male who presents for Hospital Follow-up (Pneumonia).  Pt here for follow up from [facility] stay 8/20-24 for pneumonia. He went in with cough with bloody sputum and fever. He was found to have RLL pneumonia. He  was tx with IV Abx then changed to PO and discharged home with 7 additional days. He finished today. Breathing is back to baseline. Energy is low. He is not having fever or chills. Cough is nearly resolved. Biggest issue is poor sleep since he's been home . He is waking about every 2hours. Tried Melatonin without much help. Otherwise feeling well.     Discharge Facility:[facility]  Discharge Diagnosis:pneumonia  Admission Date:08/20/23  Discharge Date: 08/24/23    PCP Appointment Date:08/31/23  Specialist Appointment Date:   Hospital Encounter and Summary: Linked   See discharge assessment below for further details  Engagement  Call Start Time: 0826 (8/25/2023  8:26 AM)    Medications  Medications reviewed with patient/caregiver?: Yes (new meds only ciprofloxacin 750mg and azithromycin 250mg) (8/25/2023  8:26 AM)  Is the patient having any side effects they believe may be caused by any medication additions or changes?: No (8/25/2023  8:26 AM)  Does the patient have all medications ordered at discharge?: Yes (8/25/2023  8:26 AM)  Is the patient taking all medications as directed (includes completed medication regime)?: Yes (8/25/2023  8:26 AM)    Appointments  Does the patient have a primary care provider?: Yes (8/25/2023  8:26 AM)  Care Management Interventions: Verified appointment date/time/provider (8/25/2023  8:26 AM)    Self Management  Has home health visited the patient within 72 hours of discharge?: Not applicable (8/25/2023  8:26 AM)  Has all Durable Medical Equipment (DME) been delivered?: No (8/25/2023  8:26 AM)    Patient Teaching  Care Management Interventions: Reviewed instructions with patient (8/25/2023  8:26 AM)  What is the patient's  "perception of their health status since discharge?: Improving (8/25/2023  8:26 AM)    Wrap Up  Call End Time: 0840 (8/25/2023  8:26 AM)        Review of Systems   Constitutional:  Positive for fatigue. Negative for appetite change, chills and fever.   HENT:  Negative for congestion, hearing loss and sore throat.    Eyes:  Negative for pain, redness and visual disturbance.   Respiratory:  Positive for cough. Negative for chest tightness and shortness of breath.    Cardiovascular:  Negative for chest pain and leg swelling.   Gastrointestinal:  Negative for abdominal distention, abdominal pain, blood in stool, constipation and diarrhea.   Genitourinary:  Negative for difficulty urinating and dysuria.   Musculoskeletal:  Negative for arthralgias and back pain.   Skin:  Negative for rash.   Neurological:  Positive for weakness (doing PT). Negative for dizziness and headaches.   Hematological:  Negative for adenopathy. Does not bruise/bleed easily.   Psychiatric/Behavioral:  Positive for sleep disturbance. Negative for dysphoric mood. The patient is not nervous/anxious.        Objective   /52   Pulse 66   Resp 12   Ht 1.753 m (5' 9\")   Wt 71.2 kg (157 lb)   SpO2 97%   BMI 23.18 kg/m²    Physical Exam  Constitutional:       General: He is not in acute distress.     Appearance: Normal appearance.   Cardiovascular:      Rate and Rhythm: Normal rate and regular rhythm.      Heart sounds: Normal heart sounds. No murmur heard.  Pulmonary:      Effort: Pulmonary effort is normal. No respiratory distress.      Breath sounds: No wheezing, rhonchi or rales.      Comments: BS decreased globally  Abdominal:      Palpations: Abdomen is soft.      Tenderness: There is no abdominal tenderness.   Neurological:      Mental Status: He is alert.   Psychiatric:         Mood and Affect: Mood normal.         Judgment: Judgment normal.           Assessment/Plan   Diagnoses and all orders for this visit:  Pneumonia of both lower " lobes / hx Pulmonary emphysema- clinically improving, keep plan to see Pulmonology and repeat CT chest as they recommend  Anemia, / Electrolyte abnormality - will recheck with labs before next visit in September  Stage 3a chronic kidney disease - stable, will monitor with routine labs.  Insomnia, transient - try Rozerem for 2 weeks then try to wean off as able.  -     ramelteon (Rozerem) 8 mg tablet; Take 1 tablet (8 mg) by mouth as needed at bedtime for sleep.     Follow up as planned

## 2023-09-04 DIAGNOSIS — I50.20 HFREF (HEART FAILURE WITH REDUCED EJECTION FRACTION) (MULTI): Primary | ICD-10-CM

## 2023-09-07 ENCOUNTER — PATIENT OUTREACH (OUTPATIENT)
Dept: CARE COORDINATION | Facility: CLINIC | Age: 73
End: 2023-09-07
Payer: MEDICARE

## 2023-09-07 NOTE — PROGRESS NOTES
Call regarding appt. with PCP on 08/31/23 after hospitalization.  At time of outreach call the patient feels as if their condition has (improved  since last visit.  Reviewed the PCP appointment with the pt and addressed any questions or concerns.

## 2023-09-11 ENCOUNTER — TELEMEDICINE (OUTPATIENT)
Dept: PHARMACY | Facility: HOSPITAL | Age: 73
End: 2023-09-11
Payer: MEDICARE

## 2023-09-11 DIAGNOSIS — I50.20 HFREF (HEART FAILURE WITH REDUCED EJECTION FRACTION) (MULTI): ICD-10-CM

## 2023-09-11 RX ORDER — ROSUVASTATIN CALCIUM 40 MG/1
1 TABLET, COATED ORAL DAILY
COMMUNITY
Start: 2022-06-13 | End: 2024-03-05 | Stop reason: SDUPTHER

## 2023-09-11 RX ORDER — DAPAGLIFLOZIN 10 MG/1
1 TABLET, FILM COATED ORAL
COMMUNITY
Start: 2023-08-02 | End: 2023-10-25 | Stop reason: WASHOUT

## 2023-09-11 NOTE — PROGRESS NOTES
Pharmacy Post-Discharge Visit  Wojciech Kern is a 73 y.o. male was referred to Clinical Pharmacy Team to complete a post-discharge medication optimization and monitoring visit.  The patient was referred for their Congestive Heart Failure.    Admission Date: 8/20/23  Discharge Date: 8/24/23    Referring Provider: Helio Sparks MD    Subjective   Allergies   Allergen Reactions    Penicillins Anaphylaxis and Hives    Sulfa (Sulfonamide Antibiotics) [id number] - [place], [place] - [address]  [address] [address]  Phone: [phone] Fax: [phone]      Social History     Social History Narrative    Not on file        Notable Medication changes following discharge:  Start: levofloxacin 750 mg Q24H, azithromycin 250 mg Q24H  Stop: none  Change: none      CHF Assessment    Symptom/Staging: *Last ECHO 09/2022  -Most recent ejection fraction: 25-30%  -NYHA Stage: II  -ABCD Stage: C  -Weight changes?: No    Guideline-Directed Medical Therapy:  -ARNI: Yes, describe: Entresto  mg BID   -If no, then ACEi/ARB?: No  -Beta Blocker: Yes, describe: carvedilol 6.25 mg BID  -MRA: No  -SGLT2i: Yes, describe: Farxiga 10 mg daily    Secondary Prevention:  -The ASCVD Risk score (Arsenio GRIFFITH, et al., 2019) failed to calculate for the following reasons:    The patient has a prior MI or stroke diagnosis   -Aspirin 81mg? yes as part of DAPT  -Statin?: Yes, describe: Rosuvastatin 40 mg daily  -HTN?: Yes, describe: Treated with entresto, carvediolol      Review of Systems    Objective     There were no vitals taken for this visit.     LAB  Lab Results   Component Value Date    BILITOT 0.6 08/20/2023    CALCIUM CANCELED 08/25/2023    CO2 CANCELED 08/25/2023    CL CANCELED 08/25/2023    CREATININE CANCELED 08/25/2023    GLUCOSE CANCELED 08/25/2023    ALKPHOS 53 08/20/2023    K CANCELED 08/25/2023    PROT 7.7 08/20/2023    NA CANCELED 08/25/2023  "   AST 17 08/20/2023    ALT 14 08/20/2023    BUN CANCELED 08/25/2023    ANIONGAP CANCELED 08/25/2023    MG 1.60 08/20/2023    PHOS 3.3 11/22/2022    ALBUMIN 3.7 08/20/2023    LIPASE 69 08/16/2018    GFRF CANCELED 08/25/2023    GFRMALE CANCELED 08/25/2023     Lab Results   Component Value Date    TRIG 43 06/21/2023    CHOL 107 06/21/2023    HDL 46.0 06/21/2023     No results found for: \"HGBA1C\"      Current Outpatient Medications on File Prior to Visit   Medication Sig Dispense Refill    albuterol (ProAir HFA) 90 mcg/actuation inhaler Inhale 2 puffs every 4 hours if needed for wheezing or shortness of breath. 8.5 g 0    aspirin 81 mg EC tablet Take 1 tablet (81 mg) by mouth once daily.      carvedilol (Coreg) 6.25 mg tablet Take 1 tablet (6.25 mg) by mouth 2 times a day. 180 tablet 0    cholecalciferol (Vitamin D-3) 50 mcg (2,000 unit) capsule Take 1 capsule (50 mcg) by mouth early in the morning.. With a meal      clopidogrel (Plavix) 75 mg tablet Take 1 tablet (75 mg) by mouth once daily.      Entresto  mg tablet Take 1 tablet by mouth 2 times a day. 60 tablet 0    Farxiga 10 mg Take 1 tablet (10 mg) by mouth once daily in the morning. Take before meals.      multivitamin (MULTIPLE VITAMINS ORAL) Take 1 tablet by mouth once daily.      rosuvastatin (Crestor) 40 mg tablet Take 1 tablet (40 mg) by mouth once daily.      [DISCONTINUED] rosuvastatin (Crestor) 20 mg tablet Take 1 tablet (20 mg) by mouth once daily. (Patient taking differently: Take 2 tablets (40 mg) by mouth once daily.) 90 tablet 0    [DISCONTINUED] ramelteon (Rozerem) 8 mg tablet Take 1 tablet (8 mg) by mouth as needed at bedtime for sleep. (Patient not taking: Reported on 9/11/2023) 30 tablet 0     No current facility-administered medications on file prior to visit.        HISTORICAL PHARMACOTHERAPY  - Jardiance 25 mg - changed to Farxiga 08/2023 d/t reported SOB  - Carvedilol 12.5 mg BID - dose decreased to 6.25 mg BID 08/2021  - Lisinopril " and losartan - now on ARB  - metoprolol tart 25 mg daily - now on carvedilol    DRUG INTERATIONS  - no known significant drug interactions requiring a change in therapy    Assessment/Plan   Problem List Items Addressed This Visit       HFrEF (heart failure with reduced ejection fraction) (CMS/[facility])     No weight gain >2lbs/day or 5 lbs/week. No lower extremity swelling.  Primary concern today is medication affordability. Not taking Farxiga d/t high cost. Looking into this and will follow up. Entresto also expensive when in coverage gap. Discussed [facility] for assistance with cost. Patient to get back to me for preliminary income based qualification.  Completed medication reconciliation and educated on the indication, mechanism and importance of each medication.  On GDMT aside from spironolactone which cardiology opted not to start in Jan 2023.    PLAN:  Continue current pharmacotherapy per cardiology plan 8/2/23        Follow up: as needed for update on financial information related to [facility]. Will schedule follow up then.    Continue all meds under the continuation of care with the referring provider and clinical pharmacy team.    Juan Catalan PharmD     Verbal consent to manage patient's drug therapy was obtained from [the patient and/or an individual authorized to act on behalf of a patient]. They were informed they may decline to participate or withdraw from participation in pharmacy services at any time.

## 2023-09-11 NOTE — ASSESSMENT & PLAN NOTE
No weight gain >2lbs/day or 5 lbs/week. No lower extremity swelling.  Primary concern today is medication affordability. Not taking Farxiga d/t high cost. Looking into this and will follow up. Entresto also expensive when in coverage gap. Discussed [facility] for assistance with cost. Patient to get back to me for preliminary income based qualification.  Completed medication reconciliation and educated on the indication, mechanism and importance of each medication.  On GDMT aside from spironolactone which cardiology opted not to start in Jan 2023.    PLAN:  Continue current pharmacotherapy per cardiology plan 8/2/23

## 2023-09-12 ENCOUNTER — TELEPHONE (OUTPATIENT)
Dept: PRIMARY CARE | Facility: CLINIC | Age: 73
End: 2023-09-12
Payer: MEDICARE

## 2023-09-15 ENCOUNTER — OFFICE VISIT (OUTPATIENT)
Dept: PRIMARY CARE | Facility: CLINIC | Age: 73
End: 2023-09-15
Payer: MEDICARE

## 2023-09-15 VITALS
RESPIRATION RATE: 12 BRPM | HEART RATE: 48 BPM | BODY MASS INDEX: 23.92 KG/M2 | WEIGHT: 162 LBS | OXYGEN SATURATION: 96 % | DIASTOLIC BLOOD PRESSURE: 72 MMHG | SYSTOLIC BLOOD PRESSURE: 126 MMHG

## 2023-09-15 DIAGNOSIS — I50.20 HFREF (HEART FAILURE WITH REDUCED EJECTION FRACTION) (MULTI): ICD-10-CM

## 2023-09-15 DIAGNOSIS — G47.00 INSOMNIA, UNSPECIFIED TYPE: ICD-10-CM

## 2023-09-15 DIAGNOSIS — K21.9 GASTROESOPHAGEAL REFLUX DISEASE, UNSPECIFIED WHETHER ESOPHAGITIS PRESENT: ICD-10-CM

## 2023-09-15 DIAGNOSIS — E78.5 DYSLIPIDEMIA: ICD-10-CM

## 2023-09-15 DIAGNOSIS — J43.9 PULMONARY EMPHYSEMA, UNSPECIFIED EMPHYSEMA TYPE (MULTI): ICD-10-CM

## 2023-09-15 DIAGNOSIS — I25.10 CORONARY ARTERY DISEASE INVOLVING NATIVE HEART WITHOUT ANGINA PECTORIS, UNSPECIFIED VESSEL OR LESION TYPE: Primary | ICD-10-CM

## 2023-09-15 DIAGNOSIS — I10 BENIGN ESSENTIAL HYPERTENSION: ICD-10-CM

## 2023-09-15 DIAGNOSIS — N18.31 STAGE 3A CHRONIC KIDNEY DISEASE (MULTI): ICD-10-CM

## 2023-09-15 DIAGNOSIS — Z95.810 AICD (AUTOMATIC CARDIOVERTER/DEFIBRILLATOR) PRESENT: ICD-10-CM

## 2023-09-15 PROCEDURE — 1160F RVW MEDS BY RX/DR IN RCRD: CPT | Performed by: FAMILY MEDICINE

## 2023-09-15 PROCEDURE — 3074F SYST BP LT 130 MM HG: CPT | Performed by: FAMILY MEDICINE

## 2023-09-15 PROCEDURE — 1036F TOBACCO NON-USER: CPT | Performed by: FAMILY MEDICINE

## 2023-09-15 PROCEDURE — 99214 OFFICE O/P EST MOD 30 MIN: CPT | Performed by: FAMILY MEDICINE

## 2023-09-15 PROCEDURE — 3078F DIAST BP <80 MM HG: CPT | Performed by: FAMILY MEDICINE

## 2023-09-15 PROCEDURE — 1159F MED LIST DOCD IN RCRD: CPT | Performed by: FAMILY MEDICINE

## 2023-09-15 PROCEDURE — 1157F ADVNC CARE PLAN IN RCRD: CPT | Performed by: FAMILY MEDICINE

## 2023-09-15 PROCEDURE — 1126F AMNT PAIN NOTED NONE PRSNT: CPT | Performed by: FAMILY MEDICINE

## 2023-09-15 ASSESSMENT — ENCOUNTER SYMPTOMS
ABDOMINAL PAIN: 0
DYSURIA: 0
DIFFICULTY URINATING: 0
EYE REDNESS: 0
CHILLS: 0
FEVER: 0
NERVOUS/ANXIOUS: 0
WEAKNESS: 0
APPETITE CHANGE: 0
DIARRHEA: 0
HEADACHES: 0
FATIGUE: 1
ARTHRALGIAS: 0
CONSTIPATION: 0
DYSPHORIC MOOD: 0
EYE PAIN: 0
ADENOPATHY: 0
BRUISES/BLEEDS EASILY: 0
ABDOMINAL DISTENTION: 0
COUGH: 0
BACK PAIN: 0
SHORTNESS OF BREATH: 0
DIZZINESS: 0
SORE THROAT: 0
BLOOD IN STOOL: 0
CHEST TIGHTNESS: 0

## 2023-09-15 NOTE — PROGRESS NOTES
Subjective   Patient ID: Wojciech Kern is a 73 y.o. male who presents for Follow-up.  Pt has chronic CAD, CHF, HTN, DLD. Seeing cardiology, says they are aware and didn't recommend change in medication for low HR.  Pt is taking ASA, Plavix, Entresto, Coreg. Tolerating well.  Exercising 3-6 days per week   Low sodium diet is usually being followed.   Is not monitoring home blood pressures. Readings range 120s/70s.  Denies HA, vision changes or CP.     Pt has Dyslipidemia.   Lipid panel showed LDL in good range in June.  Currently taking Rosuvastatin and is tolerating well without muscle pains or weakness.     Pt has CRI. It is stable  with GFR 47.     Pt has chronic COPD. Taking Albuterol . Is not having symptoms of SOB and cough heightened from baseline. He had CT last week per pulmonology showed improvement of PNA, recommendation for fu in 3months.    GERD is well controlled without medication.  Denies epigastric pain, nausea, heartburn or water brash.         Review of Systems   Constitutional:  Positive for fatigue. Negative for appetite change, chills and fever.   HENT:  Negative for congestion, hearing loss and sore throat.    Eyes:  Negative for pain, redness and visual disturbance.   Respiratory:  Negative for cough, chest tightness and shortness of breath.    Cardiovascular:  Negative for chest pain and leg swelling.   Gastrointestinal:  Negative for abdominal distention, abdominal pain, blood in stool, constipation and diarrhea.   Genitourinary:  Negative for difficulty urinating and dysuria.   Musculoskeletal:  Negative for arthralgias and back pain.   Skin:  Negative for rash.   Neurological:  Negative for dizziness, weakness and headaches.   Hematological:  Negative for adenopathy. Does not bruise/bleed easily.   Psychiatric/Behavioral:  Negative for dysphoric mood. The patient is not nervous/anxious.        Objective   /72   Pulse (!) 48   Resp 12   Wt 73.5 kg (162 lb)   SpO2 96%   BMI 23.92  kg/m²    Physical Exam  Constitutional:       General: He is not in acute distress.     Appearance: Normal appearance. He is not ill-appearing.   HENT:      Head: Normocephalic and atraumatic.      Right Ear: Tympanic membrane, ear canal and external ear normal.      Left Ear: Tympanic membrane, ear canal and external ear normal.      Nose: Nose normal.      Mouth/Throat:      Mouth: Mucous membranes are moist.      Pharynx: No oropharyngeal exudate or posterior oropharyngeal erythema.   Eyes:      Extraocular Movements: Extraocular movements intact.      Conjunctiva/sclera: Conjunctivae normal.      Pupils: Pupils are equal, round, and reactive to light.   Neck:      Vascular: No carotid bruit.   Cardiovascular:      Rate and Rhythm: Normal rate and regular rhythm.      Heart sounds: Normal heart sounds. No murmur heard.  Pulmonary:      Breath sounds: Normal breath sounds. No wheezing, rhonchi or rales.   Abdominal:      General: Bowel sounds are normal. There is no distension.      Palpations: Abdomen is soft. There is no mass.      Tenderness: There is no abdominal tenderness.   Musculoskeletal:         General: No swelling or deformity.      Cervical back: Neck supple. No tenderness.   Lymphadenopathy:      Cervical: No cervical adenopathy.   Skin:     General: Skin is warm and dry.      Findings: No lesion or rash.   Neurological:      Mental Status: He is alert and oriented to person, place, and time.      Sensory: No sensory deficit.      Motor: No weakness.      Coordination: Coordination normal.      Deep Tendon Reflexes: Reflexes normal.   Psychiatric:         Mood and Affect: Mood normal.         Behavior: Behavior normal.         Judgment: Judgment normal.           Assessment/Plan   Diagnoses and all orders for this visit:  Coronary artery disease / HFrEF (heart failure with reduced ejection fraction)/AICD (automatic cardioverter/defibrillator) present/  Benign essential hypertension - stable.  Discussed monitoring low heart rate and if getting down in low 40s should call cardiology for further instructions  Dyslipidemia - doing well on statin  Pulmonary emphysema, / recent Pneumonia - reviewed CT and appears to be improved, will need to discuss result and follow up with Pulmonology  Stage 3a chronic kidney disease - stable, will monitor with routine labs.  Gastroesophageal reflux  - stable without medication, monitor.  Insomnia, - had morning drowsiness with Rozerem, discussed continuing Melatonin/Benadryl as needed    Follow up in 6months, 30mins

## 2023-09-15 NOTE — PROGRESS NOTES
Subjective   Patient ID: Wojciech Kern is a 73 y.o. male who presents for Follow-up.    HPI     Review of Systems    Objective   /72   Pulse (!) 48   Resp 12   Wt 73.5 kg (162 lb)   SpO2 96%   BMI 23.92 kg/m²     Physical Exam    Assessment/Plan

## 2023-09-22 ENCOUNTER — PATIENT OUTREACH (OUTPATIENT)
Dept: CARE COORDINATION | Facility: CLINIC | Age: 73
End: 2023-09-22
Payer: MEDICARE

## 2023-10-19 ENCOUNTER — TELEPHONE (OUTPATIENT)
Dept: PRIMARY CARE | Facility: CLINIC | Age: 73
End: 2023-10-19
Payer: MEDICARE

## 2023-10-19 NOTE — TELEPHONE ENCOUNTER
Patient called and left VM, needs to schedule for shortness of breath.  Called patient, transferred call to nurse for appointment.

## 2023-10-19 NOTE — TELEPHONE ENCOUNTER
Pt states increased SOB at night. During day no unusual symptoms. Denies cough, chest tightness, dizziness, fever. Has taken inhaler w/o relief.

## 2023-10-23 ENCOUNTER — APPOINTMENT (OUTPATIENT)
Dept: CARDIOLOGY | Facility: CLINIC | Age: 73
End: 2023-10-23
Payer: MEDICARE

## 2023-10-25 ENCOUNTER — OFFICE VISIT (OUTPATIENT)
Dept: CARDIOLOGY | Facility: CLINIC | Age: 73
End: 2023-10-25
Payer: MEDICARE

## 2023-10-25 VITALS
OXYGEN SATURATION: 97 % | HEIGHT: 69 IN | WEIGHT: 157.31 LBS | BODY MASS INDEX: 23.3 KG/M2 | HEART RATE: 49 BPM | DIASTOLIC BLOOD PRESSURE: 80 MMHG | SYSTOLIC BLOOD PRESSURE: 142 MMHG

## 2023-10-25 DIAGNOSIS — I25.10 CORONARY ARTERY DISEASE INVOLVING NATIVE HEART WITHOUT ANGINA PECTORIS, UNSPECIFIED VESSEL OR LESION TYPE: ICD-10-CM

## 2023-10-25 DIAGNOSIS — E78.5 DYSLIPIDEMIA: ICD-10-CM

## 2023-10-25 DIAGNOSIS — R06.09 EXERTIONAL DYSPNEA: ICD-10-CM

## 2023-10-25 DIAGNOSIS — I50.20 HFREF (HEART FAILURE WITH REDUCED EJECTION FRACTION) (MULTI): ICD-10-CM

## 2023-10-25 DIAGNOSIS — Z95.810 AICD (AUTOMATIC CARDIOVERTER/DEFIBRILLATOR) PRESENT: Primary | ICD-10-CM

## 2023-10-25 DIAGNOSIS — I10 BENIGN ESSENTIAL HYPERTENSION: ICD-10-CM

## 2023-10-25 PROCEDURE — 3077F SYST BP >= 140 MM HG: CPT | Performed by: NURSE PRACTITIONER

## 2023-10-25 PROCEDURE — 1160F RVW MEDS BY RX/DR IN RCRD: CPT | Performed by: NURSE PRACTITIONER

## 2023-10-25 PROCEDURE — 99214 OFFICE O/P EST MOD 30 MIN: CPT | Performed by: NURSE PRACTITIONER

## 2023-10-25 PROCEDURE — 1159F MED LIST DOCD IN RCRD: CPT | Performed by: NURSE PRACTITIONER

## 2023-10-25 PROCEDURE — 1036F TOBACCO NON-USER: CPT | Performed by: NURSE PRACTITIONER

## 2023-10-25 PROCEDURE — 1126F AMNT PAIN NOTED NONE PRSNT: CPT | Performed by: NURSE PRACTITIONER

## 2023-10-25 PROCEDURE — 3079F DIAST BP 80-89 MM HG: CPT | Performed by: NURSE PRACTITIONER

## 2023-10-25 ASSESSMENT — PAIN SCALES - GENERAL: PAINLEVEL: 0-NO PAIN

## 2023-10-25 ASSESSMENT — ENCOUNTER SYMPTOMS
LOSS OF SENSATION IN FEET: 1
DEPRESSION: 0
OCCASIONAL FEELINGS OF UNSTEADINESS: 1
SHORTNESS OF BREATH: 1

## 2023-10-25 NOTE — PROGRESS NOTES
Subjective   Wojciech Kern is a 73 y.o. male.    Chief Complaint:  Shortness of Breath and Coronary Artery Disease    Mr. Kern returns for an interval follow up. He comes in today with complaints of intermittent dyspnea. He was recently admitted (8/2023) for recurrent pneumonia. He has a follow up with pulmonology in November. He feels his dyspnea is improving, though at times remains bothersome. He denies any exertional chest pain, new swelling or orthopnea. He offers no other cardiovascular complaints or concerns today. He denies any complaints of chest pain, lightheadedness, dizziness, palpitations, syncope, orthopnea, paroxysmal nocturnal dyspnea, lower extremity swelling or bleeding concerns.      Shortness of Breath  His past medical history is significant for CAD.   Coronary Artery Disease  Symptoms include shortness of breath.       Review of Systems   Respiratory:  Positive for shortness of breath.    All other systems reviewed and are negative.      Objective   Physical Exam  Constitutional:       Appearance: Healthy appearance. In no distress  Pulmonary:      Effort: Pulmonary effort is normal.      Breath sounds: Normal breath sounds.   Cardiovascular:      Normal rate. Regular rhythm. Normal S1. Normal S2.       Murmurs: There is no murmur.   Pacemaker/ICD Implant site has healed without signs of infection.  Edema:     Peripheral edema absent.   Abdominal:      Palpations: Abdomen is soft.   Musculoskeletal: Normal range of motion.      Cervical back: Normal range of motion. Skin:     General: Skin is warm and dry.   Neurological:      Mental Status: Alert and oriented to person, place and time.         Lab Results   Component Value Date    CHOL 107 06/21/2023    TRIG 43 06/21/2023    HDL 46.0 06/21/2023       Assessment/Plan   Mr. 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 Echocardiogram 9/2022 showed an EF of 25-30% with eccentric LVH, mildly reduced RV systolic function and no significant valvular disease. Heart catheterization 9/2022 showed moderate LM disease similar to prior angiography, patent RCA stents to level of RPDA, subintimal stent in RPL occluded and patent SVG-LAD. He presents today with complaints of increased dyspnea on exertion. He was also recently readmitted for recurrent pneumonia. His VS and EKG remain stable. Device interrogation 9/5/23 showed no VT/VF detections or therapies and 43% battery remaining.  His dyspnea has improved though continues to remain somewhat bothersome. Given his complaints, I will schedule him for an echocardiogram. I will call him with his results once available and make further recommendations at that time. He will follow up with us in March as previously scheduled. He knows to call for any concerns.

## 2023-11-07 ENCOUNTER — HOSPITAL ENCOUNTER (OUTPATIENT)
Dept: CARDIOLOGY | Facility: CLINIC | Age: 73
Discharge: HOME | End: 2023-11-07
Payer: MEDICARE

## 2023-11-07 DIAGNOSIS — I50.20 HFREF (HEART FAILURE WITH REDUCED EJECTION FRACTION) (MULTI): ICD-10-CM

## 2023-11-07 DIAGNOSIS — R06.09 EXERTIONAL DYSPNEA: ICD-10-CM

## 2023-11-07 DIAGNOSIS — R06.00 DYSPNEA, UNSPECIFIED: ICD-10-CM

## 2023-11-07 DIAGNOSIS — I25.10 CORONARY ARTERY DISEASE INVOLVING NATIVE HEART WITHOUT ANGINA PECTORIS, UNSPECIFIED VESSEL OR LESION TYPE: ICD-10-CM

## 2023-11-07 LAB
AORTIC VALVE PEAK VELOCITY: 1.31
AV PEAK GRADIENT: 6.9
AVA (PEAK VEL): 2.11
EJECTION FRACTION APICAL 4 CHAMBER: 37.4
EJECTION FRACTION: 29
LEFT ATRIUM VOLUME AREA LENGTH INDEX BSA: 94.4
LEFT VENTRICULAR OUTFLOW TRACT DIAMETER: 2.04
MITRAL VALVE E/A RATIO: 3.21
RIGHT VENTRICLE PEAK SYSTOLIC PRESSURE: 27.2
TRICUSPID ANNULAR PLANE SYSTOLIC EXCURSION: 1.3

## 2023-11-07 PROCEDURE — 93306 TTE W/DOPPLER COMPLETE: CPT

## 2023-11-07 PROCEDURE — 93306 TTE W/DOPPLER COMPLETE: CPT | Performed by: INTERNAL MEDICINE

## 2023-11-13 ENCOUNTER — TELEPHONE (OUTPATIENT)
Dept: CARDIOLOGY | Facility: CLINIC | Age: 73
End: 2023-11-13
Payer: MEDICARE

## 2023-11-13 NOTE — TELEPHONE ENCOUNTER
I left a VM for patient regarding echo results. Follow up with us as scheduled in March. Should dyspnea worsen, we may need to consider a repeat catheterization to assess severity of MR. He knows to call for any concerns.

## 2023-11-20 ENCOUNTER — HOSPITAL ENCOUNTER (OUTPATIENT)
Dept: RESPIRATORY THERAPY | Facility: CLINIC | Age: 73
Discharge: HOME | End: 2023-11-20
Payer: MEDICARE

## 2023-11-20 ENCOUNTER — OFFICE VISIT (OUTPATIENT)
Dept: PULMONOLOGY | Facility: CLINIC | Age: 73
End: 2023-11-20
Payer: MEDICARE

## 2023-11-20 VITALS
HEART RATE: 66 BPM | SYSTOLIC BLOOD PRESSURE: 136 MMHG | TEMPERATURE: 97.6 F | DIASTOLIC BLOOD PRESSURE: 68 MMHG | BODY MASS INDEX: 22.64 KG/M2 | WEIGHT: 152.9 LBS | HEIGHT: 69 IN | OXYGEN SATURATION: 96 % | RESPIRATION RATE: 17 BRPM

## 2023-11-20 DIAGNOSIS — J44.9 CHRONIC OBSTRUCTIVE PULMONARY DISEASE, UNSPECIFIED COPD TYPE (MULTI): ICD-10-CM

## 2023-11-20 DIAGNOSIS — R09.89 ABNORMAL FINDING OF LUNG: ICD-10-CM

## 2023-11-20 DIAGNOSIS — J31.0 CHRONIC RHINITIS: ICD-10-CM

## 2023-11-20 DIAGNOSIS — J43.9 PULMONARY EMPHYSEMA, UNSPECIFIED EMPHYSEMA TYPE (MULTI): Primary | ICD-10-CM

## 2023-11-20 DIAGNOSIS — G47.00 INSOMNIA, UNSPECIFIED TYPE: ICD-10-CM

## 2023-11-20 PROCEDURE — 1159F MED LIST DOCD IN RCRD: CPT | Performed by: STUDENT IN AN ORGANIZED HEALTH CARE EDUCATION/TRAINING PROGRAM

## 2023-11-20 PROCEDURE — 1036F TOBACCO NON-USER: CPT | Performed by: STUDENT IN AN ORGANIZED HEALTH CARE EDUCATION/TRAINING PROGRAM

## 2023-11-20 PROCEDURE — 94060 EVALUATION OF WHEEZING: CPT | Performed by: STUDENT IN AN ORGANIZED HEALTH CARE EDUCATION/TRAINING PROGRAM

## 2023-11-20 PROCEDURE — 3078F DIAST BP <80 MM HG: CPT | Performed by: STUDENT IN AN ORGANIZED HEALTH CARE EDUCATION/TRAINING PROGRAM

## 2023-11-20 PROCEDURE — 94727 GAS DIL/WSHOT DETER LNG VOL: CPT | Performed by: STUDENT IN AN ORGANIZED HEALTH CARE EDUCATION/TRAINING PROGRAM

## 2023-11-20 PROCEDURE — 99205 OFFICE O/P NEW HI 60 MIN: CPT | Performed by: STUDENT IN AN ORGANIZED HEALTH CARE EDUCATION/TRAINING PROGRAM

## 2023-11-20 PROCEDURE — 94727 GAS DIL/WSHOT DETER LNG VOL: CPT

## 2023-11-20 PROCEDURE — 1126F AMNT PAIN NOTED NONE PRSNT: CPT | Performed by: STUDENT IN AN ORGANIZED HEALTH CARE EDUCATION/TRAINING PROGRAM

## 2023-11-20 PROCEDURE — 94060 EVALUATION OF WHEEZING: CPT

## 2023-11-20 PROCEDURE — 1160F RVW MEDS BY RX/DR IN RCRD: CPT | Performed by: STUDENT IN AN ORGANIZED HEALTH CARE EDUCATION/TRAINING PROGRAM

## 2023-11-20 PROCEDURE — 94729 DIFFUSING CAPACITY: CPT | Performed by: STUDENT IN AN ORGANIZED HEALTH CARE EDUCATION/TRAINING PROGRAM

## 2023-11-20 PROCEDURE — 94729 DIFFUSING CAPACITY: CPT

## 2023-11-20 PROCEDURE — 99215 OFFICE O/P EST HI 40 MIN: CPT | Performed by: STUDENT IN AN ORGANIZED HEALTH CARE EDUCATION/TRAINING PROGRAM

## 2023-11-20 PROCEDURE — 3075F SYST BP GE 130 - 139MM HG: CPT | Performed by: STUDENT IN AN ORGANIZED HEALTH CARE EDUCATION/TRAINING PROGRAM

## 2023-11-20 ASSESSMENT — ENCOUNTER SYMPTOMS
OCCASIONAL FEELINGS OF UNSTEADINESS: 1
FATIGUE: 1
SLEEP DISTURBANCE: 1
MUSCULOSKELETAL NEGATIVE: 1
GASTROINTESTINAL NEGATIVE: 1
WHEEZING: 1
ALLERGIC/IMMUNOLOGIC NEGATIVE: 1
ENDOCRINE NEGATIVE: 1
COUGH: 1

## 2023-11-20 NOTE — PATIENT INSTRUCTIONS
Thank you for visiting the Pulmonary Clinic today.   Your breathing medications: trial of flonase nasal spray for the congestion--one spray each nostril   Tests: CT scan in December,  Pulmonary Function Test (will be done today)   Referrals:  sleep medicine for the insomnia   Return in 2 months or sooner if needed   If you have questions or concerns, call [phone] (option 4)

## 2023-11-20 NOTE — PROGRESS NOTES
Department of Medicine I Division of Pulmonary, Critical Care, and Sleep Medicine   [id number] [place] [address] [address] [place] [address]  Phone: [phone]  Fax: [phone]    History of Present Illness   Wojciech Kern is a 73 y.o. male presenting with hospital follow up from pneumonia     Referred by:  Dr. Flores.  I have independently interviewed and examined the patient in the office and reviewed available records.  He is here for review of his CT scan but notes he is more concerned about the insomnia he is having   Recent admission to [facility] 8/20/2023--8/24/23 for pneumonia   Endorses 3 admissions for pneumonia in the last year   Does endorse chronic congestion and chronic cough with clear phlegm.   Chart diagnosis of   COPD--> diagnosed several years ago.  ? Wheezing   Denies any trouble swallowing   Constant runny nose, recurrent bronchitis   Goes to bed at 11pm  Does sometimes drink caffeinated drinks (coffee)  after noon  Takes OTC sleep aid (not sure what it was called), also takes melatonin--when he takes it he is usually able to fall right asleep   Has trouble falling asleep and then also waking up and can't get back to sleep  Endorses having to wake up frequently to urinate   Also drinking a lot of water (endorses leg cramping at night time), drinks close to bedtime, states if he doesn't he gets a lot of cramping in his legs       Pulmonary medications:  albuterol inh (rarely)   CAT score: 21   PMH: CAD CABG and VSD repair in 2015,  COPD, HFrEF, CKD3a, insomnia, macular degeneration  SH: quit smoking in 1998,  1ppd for 20 years.  Occupation:  .  Used to restore historical homes. (Couple of years ago). 2 dogs  FH:  father lived to 89, paternal uncle: lung cancer        Review of Systems  Review of Systems   Constitutional:  Positive for fatigue.   HENT:  Positive for congestion.    Respiratory:  Positive for cough and wheezing.    Cardiovascular:  Negative for chest pain.    Gastrointestinal: Negative.    Endocrine: Negative.    Genitourinary:         Urinates frequently at night   Musculoskeletal: Negative.    Allergic/Immunologic: Negative.    Neurological:         Insomnia as per hpi    Psychiatric/Behavioral:  Positive for sleep disturbance.      All other review of systems are negative and/or non-contributory.    Past Medical History   He has a past medical history of Age-related nuclear cataract, left eye (03/31/2016), Anesthesia of skin (03/17/2016), Aphakia, left eye (03/31/2016), Atherosclerotic heart disease of native coronary artery without angina pectoris (10/28/2015), Chronic kidney disease, stage 3 unspecified (CMS/[facility]) (12/19/2019), Chronic kidney disease, unspecified (10/28/2015), Community acquired pneumonia (04/06/2023), Cortical age-related cataract, left eye (03/31/2016), Cough, unspecified (05/18/2016), Cramp and spasm (08/01/2014), Disorder of kidney and ureter, unspecified (04/03/2015), Dysphagia, unspecified (08/20/2015), Dysuria (09/21/2015), Dysuria (04/06/2023), Encounter for immunization (10/13/2020), Essential (primary) hypertension (02/26/2018), Excessive sleepiness (04/06/2023), Fear for personal safety (04/06/2023), Gastritis (04/06/2023), Hemoptysis (05/17/2016), Kidney stone on left side (04/06/2023), Melena (04/06/2023), NUD (nonulcer dyspepsia) (04/06/2023), Other specified disorders of bone, lower leg (07/26/2016), Palpitations (11/01/2016), Paresthesia of both feet (04/06/2023), Personal history of diseases of the blood and blood-forming organs and certain disorders involving the immune mechanism (06/06/2017), Personal history of other diseases of the circulatory system (11/01/2016), Personal history of other endocrine, nutritional and metabolic disease (10/28/2015), Personal history of other infectious and parasitic diseases (09/28/2015), Personal history of other infectious and parasitic diseases (10/23/2013), Personal history of other specified  conditions (08/20/2015), Personal history of other specified conditions (09/21/2015), Personal history of peptic ulcer disease (05/23/2019), Personal history of peptic ulcer disease (05/23/2019), Personal history of pneumonia (recurrent) (03/07/2016), Personal history of urinary (tract) infections (10/06/2015), Prurigo nodularis (04/06/2023), Right upper quadrant pain (02/10/2014), Unspecified abdominal pain (09/04/2015), Urgency of urination (09/21/2015), Urinary retention (04/06/2023), and Urinary tract infection, site not specified (09/22/2015).    Immunizations     Immunization History   Administered Date(s) Administered    Influenza, seasonal, injectable 09/19/2022    Pfizer Gray Cap SARS-CoV-2 06/03/2022    Pfizer Purple Cap SARS-CoV-2 03/15/2021, 04/05/2021, 12/17/2021    Pneumococcal polysaccharide vaccine, 23-valent, age 2 years and older (PNEUMOVAX 23) 03/16/2015, 11/23/2022    Tdap vaccine, age 7 year and older (BOOSTRIX) 10/11/2014, 02/02/2017, 04/09/2019    Zoster, live 12/08/2014       Medications and Allergies     Current Outpatient Medications   Medication Instructions    albuterol (ProAir HFA) 90 mcg/actuation inhaler 2 puffs, inhalation, Every 4 hours PRN    aspirin 81 mg EC tablet 1 tablet, oral, Daily    carvedilol (COREG) 6.25 mg, oral, 2 times daily    clopidogrel (PLAVIX) 75 mg, oral, Daily    Entresto  mg tablet 1 tablet, oral, 2 times daily    multivitamin (MULTIPLE VITAMINS ORAL) 1 tablet, oral, Daily    rosuvastatin (Crestor) 40 mg tablet 1 tablet, oral, Daily      Penicillins and Sulfa (sulfonamide antibiotics)    Social History   He reports that he quit smoking about 25 years ago. His smoking use included cigarettes. He has never used smokeless tobacco. He reports that he does not currently use alcohol. He reports that he does not currently use drugs.    Smoking History: see hpi   Exposure/Job History: see hpi     Family History     Family History   Problem Relation Name Age of  "Onset    Coronary artery disease Mother      Hypertension Father             Surgical History   He has a past surgical history that includes Coronary artery bypass graft (2018) and Cataract extraction (Bilateral, 2016).    Physical Exam   /68 (Patient Position: Sitting)   Pulse 66   Temp 36.4 °C (97.6 °F) (Oral)   Resp 17   Ht 1.753 m (5' 9\")   Wt 69.4 kg (152 lb 14.4 oz)   SpO2 96%   BMI 22.58 kg/m²      Physical Exam  Constitutional:       Appearance: Normal appearance.   HENT:      Head: Normocephalic and atraumatic.   Eyes:      Pupils: Pupils are equal, round, and reactive to light.   Cardiovascular:      Rate and Rhythm: Normal rate and regular rhythm.   Pulmonary:      Effort: Pulmonary effort is normal.      Breath sounds: Normal breath sounds.   Skin:     Findings: No rash.   Neurological:      General: No focal deficit present.      Mental Status: He is alert and oriented to person, place, and time.   Psychiatric:         Mood and Affect: Mood normal.          Results   Pulmonary Function Tests:      2023 (done after visit)   FEV1/FVC: 75  post FEV1: 70%   T.79 (128%)   RV/T (150%)   DLCO: 52% pred   DLCO corrected for alveolar volume: 74% pred     My interpretation:  no obstruction, no bronchodilator response. Evidence of air trapping, moderately reduced DLCO, but is only mildly reduced when corrected for alveolar volume.        Chest Radiograph:  XR chest 1 view 2023    Narrative  Interpreted By:  YULIANA DENNISON DO  MRN: [id number]  Patient Name: DANICA POLANCO    STUDY:  CHEST 1 VIEW;  2023 10:58 pm    INDICATION:  hemoptysis .    COMPARISON:  2023    ACCESSION NUMBER(S):  [id number]    ORDERING CLINICIAN:  NATASHA SAMUEL    FINDINGS:  AP radiograph of the chest was provided.    Median sternotomy wires and unipolar pacemaker/AICD again noted.    CARDIOMEDIASTINAL SILHOUETTE:  Enlarged, similar to prior imaging with calcifications of the " aortic  arch.    LUNGS:  Bilateral airspace opacities appear progressed from prior imaging. No  large pleural effusion or pneumothorax identified.    ABDOMEN:  No remarkable upper abdominal findings.    BONES:  No acute osseous changes.    Impression  1.  Enlarged cardiac silhouette and bilateral lower lobe airspace  opacities which appear progressed from prior imaging. Correlation for  bilateral infection recommended.        MACRO:  None      Chest CT Scan:    CT Chest 9/08/23  IMPRESSION:  1. Significant interval decrease in extent of opacities in the right  lower lobe most consistent with improving pneumonia. Similar  opacities in the left lower lobe posterior segment compared to most  recent 2 CT studies dating back to 07/05/2023, with interval  improvement relative to CT 11/21/2022. Continued CT follow-up is  suggested, for example in 3 months.  2. Additional chronic findings are stable compared to prior CT as  detailed above including large left ventricular free wall aneurysm  and moderate pulmonary emphysema. No new abnormality in the chest is  identified.  CT angio chest for pulmonary embolism 08/20/2023    Narrative  Interpreted By:  IVAN DURAN DO  MRN: [id number]  Patient Name: DANICA POLANCO    STUDY:  CT ANGIO CHEST FOR PE;  8/20/2023 11:42 pm    INDICATION:  hemoptysis, hypoxia    COMPARISON:  CT angiogram chest 07/05/2023. CT chest 05/18/2016, 11/21/2022. Chest  x-ray 08/20/2023. CT urogram 03/05/2019.    ACCESSION NUMBER(S):  [id number]    ORDERING CLINICIAN:  NATASHA SAMUEL    TECHNIQUE:  Helical data acquisition of the chest was obtained following the  uneventful administration of  50 milliliter OMNIPAQUE 350intravenous  contrast material. Images were reformatted in axial, coronal, and  sagittal planes. MIP images were created and reviewed.    FINDINGS:  POTENTIAL LIMITATIONS OF THE STUDY: Motion artifact.    HEART AND VESSELS:  No discrete filling defects within the main pulmonary artery or  its  branches.    No thoracic aortic aneurysm.    The heart is enlarged. The patient is status post open heart surgery  .  Redemonstration of peripherally calcified aneurysm at the left  ventricular wall, not significantly changed in the interval. No  evidence of pericardial effusion.    MEDIASTINUM AND ALEXANDRO, LOWER NECK AND AXILLA:  The visualized thyroid gland is within normal limits.    No evidence of thoracic lymphadenopathy by CT criteria.    LUNGS AND AIRWAYS:  The trachea and central airways are patent.    The evaluation of the lungs is degraded by motion artifact. There are  bilateral emphysematous changes. Interval increase in the airspace  opacities at the right middle lobe, and left lower lobe. Trace right  pleural effusion. No pneumothorax.  Redemonstration of 6 mm nodule at the right lung apex, not  significantly changed since prior CT 05/18/2016 (series 606, axial  image 83).    UPPER ABDOMEN:  There is a 1.2 cm cyst at the right hepatic lobe.  There is cholelithiasis.  There is a 1.5 cm cortical hyperdense lesion at the right kidney,  previously described as a proteinaceous cyst.    CHEST WALL AND OSSEOUS STRUCTURES:  The battery pack from a pacemaker is noted at soft tissues of the  left lateral lower chest wall.  Multilevel degenerative changes at the spine.    Impression  1. No evidence of pulmonary emboli.  2. Interval increase in the bilateral airspace opacities, suggestive  of worsening pneumonia.  3. Trace right pleural effusion.  4. Emphysema.  5. Cardiomegaly. Redemonstration of peripherally calcified aneurysm  at the left ventricular wall.       ECHO:  10/25/2023  CONCLUSIONS:   1. Left ventricular systolic function is severely decreased with a 25-30% estimated ejection fraction.   2. There is global hypokinesis of the left ventricle with minor regional variations.   3. The inferior and infero lateral wall are the most hypokinetic. 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  "There is a 1.4 x 1.4 cm echodensity in the submitral apparatus (clip 110). DDx includes calcified papillary muscle (favored) v less likely thrombus.   4. There is moderate, functional mitral valve regurgitation which is eccentrically directed. The degree of mitral regurgitation may be underestimated due to the eccentricity of the jet.   5. There is mildly reduced right ventricular systolic function.   6. The left atrium is severely dilated.   7. RVSP within normal limits.   8. Compared with study from 9/10/2022, there is now at least moderate functional mitral regurgitation, which may be underestimated due to the eccentricity of the jet. A bright echodensity in the submitral apparatus is seen in some views more easily on the current study, though it also appears in certain clips on the prior study (clip 39) and favored to be a calcified papillary muscle. Comparison of the size of the LV aneurysm is challenging due to technical limitations.          Labs:  Lab Results   Component Value Date    WBC CANCELED 08/25/2023    HGB CANCELED 08/25/2023    HCT CANCELED 08/25/2023    MCV CANCELED 08/25/2023    PLT CANCELED 08/25/2023       Lab Results   Component Value Date    CREATININE CANCELED 08/25/2023    BUN CANCELED 08/25/2023    NA CANCELED 08/25/2023    K CANCELED 08/25/2023    CL CANCELED 08/25/2023    CO2 CANCELED 08/25/2023        No results found for: \"ALAN\", \"RF\", \"SEDRATE\"     IgE: No results found for: \"IGE\"   Respiratory Allergy Panel:  AEC:   Eosinophils Absolute (x10E9/L)   Date Value   08/20/2023 0.04     Eosinophils % (%)   Date Value   08/20/2023 0.4       Cultures:  No results found for: \"AFBCX\"   No results found for: \"RESPCULTCYFI\"    No results found for the last 90 days.  C     Assessment and Plan       73 year old male who is here for evaluation of recurrent pneumonia (given multiple hospitalizations in the last year) with most recent being in August 2023.     BL LL  L> R parenchymal infiltrates " changes   -overall improved from 1 year ago   -recommend to repeat CT scan 3 months from the most recent to ensure there is no suspicious lung nodule     Recurrent pneumonia   -perhaps related to aspiration although patient denies history supporting this  -repeat imaging as above    Emphysema   -I reviewed his PFTs and there is no obvious obstruction on PFTs but there is airtrapping  -defer starting any inhalers at this time unless respiratory symptoms worsen       Nasal congestion/rhinitis  -trial of flonase --pt prefers to try OTC     Insomnia  -pt has multiple factor likely contributing (frequent urination/increased water intake close to bedtime, late caffeinated beverages)  -advised life style modifications  -also has cramping in the BL LE at night which could be related to underlying RLS   -medication list is quite minimal  -unsure of what other sleep aid he is using besides melatonin--advised to avoid sleep aids that include benadryl   -sleep referral requested by patient--placed       I spent 60 minutes in the professional and overall care of this patient.      Follow-up:  2 months or sooner if needed     Viviana Mccray MD  11/20/2023

## 2023-11-21 ENCOUNTER — PATIENT OUTREACH (OUTPATIENT)
Dept: CARE COORDINATION | Facility: CLINIC | Age: 73
End: 2023-11-21
Payer: MEDICARE

## 2023-11-21 DIAGNOSIS — J43.9 PULMONARY EMPHYSEMA, UNSPECIFIED EMPHYSEMA TYPE (MULTI): ICD-10-CM

## 2023-11-21 RX ORDER — ALBUTEROL SULFATE 90 UG/1
2 AEROSOL, METERED RESPIRATORY (INHALATION) EVERY 4 HOURS PRN
Qty: 8.5 G | Refills: 0 | Status: SHIPPED | OUTPATIENT
Start: 2023-11-21 | End: 2024-03-19 | Stop reason: SDUPTHER

## 2023-11-27 ENCOUNTER — TELEPHONE (OUTPATIENT)
Dept: PRIMARY CARE | Facility: CLINIC | Age: 73
End: 2023-11-27
Payer: MEDICARE

## 2023-11-29 DIAGNOSIS — R80.9 PROTEINURIA, UNSPECIFIED TYPE: Primary | ICD-10-CM

## 2023-12-01 ENCOUNTER — OFFICE VISIT (OUTPATIENT)
Dept: PRIMARY CARE | Facility: CLINIC | Age: 73
End: 2023-12-01
Payer: MEDICARE

## 2023-12-01 VITALS
DIASTOLIC BLOOD PRESSURE: 74 MMHG | RESPIRATION RATE: 12 BRPM | HEIGHT: 69 IN | HEART RATE: 66 BPM | SYSTOLIC BLOOD PRESSURE: 133 MMHG | BODY MASS INDEX: 23.85 KG/M2 | OXYGEN SATURATION: 94 % | WEIGHT: 161 LBS

## 2023-12-01 DIAGNOSIS — R35.1 BPH ASSOCIATED WITH NOCTURIA: Primary | ICD-10-CM

## 2023-12-01 DIAGNOSIS — G47.00 INSOMNIA, UNSPECIFIED TYPE: ICD-10-CM

## 2023-12-01 DIAGNOSIS — N40.1 BPH ASSOCIATED WITH NOCTURIA: Primary | ICD-10-CM

## 2023-12-01 LAB
POC APPEARANCE, URINE: CLEAR
POC BILIRUBIN, URINE: NEGATIVE
POC BLOOD, URINE: ABNORMAL
POC COLOR, URINE: YELLOW
POC GLUCOSE, URINE: NEGATIVE MG/DL
POC KETONES, URINE: NEGATIVE MG/DL
POC LEUKOCYTES, URINE: NEGATIVE
POC NITRITE,URINE: NEGATIVE
POC PH, URINE: 6.5 PH
POC PROTEIN, URINE: NEGATIVE MG/DL
POC SPECIFIC GRAVITY, URINE: 1.01
POC UROBILINOGEN, URINE: 2 EU/DL

## 2023-12-01 PROCEDURE — 81003 URINALYSIS AUTO W/O SCOPE: CPT | Performed by: FAMILY MEDICINE

## 2023-12-01 PROCEDURE — 1160F RVW MEDS BY RX/DR IN RCRD: CPT | Performed by: FAMILY MEDICINE

## 2023-12-01 PROCEDURE — 3075F SYST BP GE 130 - 139MM HG: CPT | Performed by: FAMILY MEDICINE

## 2023-12-01 PROCEDURE — 1036F TOBACCO NON-USER: CPT | Performed by: FAMILY MEDICINE

## 2023-12-01 PROCEDURE — 99213 OFFICE O/P EST LOW 20 MIN: CPT | Performed by: FAMILY MEDICINE

## 2023-12-01 PROCEDURE — 90662 IIV NO PRSV INCREASED AG IM: CPT | Performed by: FAMILY MEDICINE

## 2023-12-01 PROCEDURE — G0008 ADMIN INFLUENZA VIRUS VAC: HCPCS | Performed by: FAMILY MEDICINE

## 2023-12-01 PROCEDURE — 1159F MED LIST DOCD IN RCRD: CPT | Performed by: FAMILY MEDICINE

## 2023-12-01 PROCEDURE — 81001 URINALYSIS AUTO W/SCOPE: CPT

## 2023-12-01 PROCEDURE — 1126F AMNT PAIN NOTED NONE PRSNT: CPT | Performed by: FAMILY MEDICINE

## 2023-12-01 PROCEDURE — 3078F DIAST BP <80 MM HG: CPT | Performed by: FAMILY MEDICINE

## 2023-12-01 RX ORDER — DIPHENHYDRAMINE HCL 25 MG
25 TABLET ORAL NIGHTLY PRN
COMMUNITY
End: 2024-02-23 | Stop reason: WASHOUT

## 2023-12-01 RX ORDER — TAMSULOSIN HYDROCHLORIDE 0.4 MG/1
0.4 CAPSULE ORAL DAILY
Qty: 90 CAPSULE | Refills: 1 | Status: SHIPPED | OUTPATIENT
Start: 2023-12-01 | End: 2024-03-14 | Stop reason: WASHOUT

## 2023-12-01 ASSESSMENT — ENCOUNTER SYMPTOMS
DIZZINESS: 0
NERVOUS/ANXIOUS: 0
ADENOPATHY: 0
SORE THROAT: 0
DIFFICULTY URINATING: 1
ABDOMINAL PAIN: 0
EYE REDNESS: 0
DIARRHEA: 0
DYSPHORIC MOOD: 0
EYE PAIN: 0
CHILLS: 0
COUGH: 0
BLOOD IN STOOL: 0
SHORTNESS OF BREATH: 0
ABDOMINAL DISTENTION: 0
BACK PAIN: 0
DYSURIA: 0
CHEST TIGHTNESS: 0
BRUISES/BLEEDS EASILY: 0
CONSTIPATION: 0
APPETITE CHANGE: 0
FEVER: 0
FATIGUE: 0
ARTHRALGIAS: 0
WEAKNESS: 0
HEADACHES: 0

## 2023-12-01 NOTE — PROGRESS NOTES
"Subjective   Patient ID: Wojciech Kern is a 73 y.o. male who presents for trouble sleeping.    HPI     Review of Systems    Objective   /74   Pulse 66   Resp 12   Ht 1.753 m (5' 9\")   Wt 73 kg (161 lb)   SpO2 94%   BMI 23.78 kg/m²     Physical Exam    Assessment/Plan          "

## 2023-12-01 NOTE — PROGRESS NOTES
"Subjective   Patient ID: Wojciech Kern is a 73 y.o. male who presents for trouble sleeping.  BPH is not well controlled. Last PSA was normal . Pt has 2 x nightly nocturia, after getting up has difficulty getting back to sleep. Urinary stream fluctuates and he denies difficulty starting urination or emptying bladder. He is taking Melanonin and \"another over the counter sleep aid\"        Review of Systems   Constitutional:  Negative for appetite change, chills, fatigue and fever.   HENT:  Negative for congestion, hearing loss and sore throat.    Eyes:  Negative for pain, redness and visual disturbance.   Respiratory:  Negative for cough, chest tightness and shortness of breath.    Cardiovascular:  Negative for chest pain and leg swelling.   Gastrointestinal:  Negative for abdominal distention, abdominal pain, blood in stool, constipation and diarrhea.   Genitourinary:  Positive for difficulty urinating. Negative for dysuria.   Musculoskeletal:  Negative for arthralgias and back pain.   Skin:  Negative for rash.   Neurological:  Negative for dizziness, weakness and headaches.   Hematological:  Negative for adenopathy. Does not bruise/bleed easily.   Psychiatric/Behavioral:  Negative for dysphoric mood. The patient is not nervous/anxious.        Objective   /74   Pulse 66   Resp 12   Ht 1.753 m (5' 9\")   Wt 73 kg (161 lb)   SpO2 94%   BMI 23.78 kg/m²    Physical Exam  Constitutional:       General: He is not in acute distress.     Appearance: Normal appearance.   Cardiovascular:      Rate and Rhythm: Normal rate and regular rhythm.      Heart sounds: Normal heart sounds. No murmur heard.  Pulmonary:      Effort: Pulmonary effort is normal.      Breath sounds: Normal breath sounds.   Abdominal:      Palpations: Abdomen is soft.      Tenderness: There is no abdominal tenderness.   Genitourinary:     Prostate: Enlarged. No nodules present.   Neurological:      Mental Status: He is alert.   Psychiatric:         " Mood and Affect: Mood normal.         Judgment: Judgment normal.           Assessment/Plan

## 2023-12-02 LAB
APPEARANCE UR: CLEAR
BILIRUB UR STRIP.AUTO-MCNC: NEGATIVE MG/DL
COLOR UR: YELLOW
GLUCOSE UR STRIP.AUTO-MCNC: NEGATIVE MG/DL
KETONES UR STRIP.AUTO-MCNC: NEGATIVE MG/DL
LEUKOCYTE ESTERASE UR QL STRIP.AUTO: NEGATIVE
NITRITE UR QL STRIP.AUTO: NEGATIVE
PH UR STRIP.AUTO: 6.5 [PH]
PROT UR STRIP.AUTO-MCNC: ABNORMAL MG/DL
RBC # UR STRIP.AUTO: ABNORMAL /UL
RBC #/AREA URNS AUTO: NORMAL /HPF
SP GR UR STRIP.AUTO: 1.01
UROBILINOGEN UR STRIP.AUTO-MCNC: 1 MG/DL
WBC #/AREA URNS AUTO: NORMAL /HPF

## 2023-12-04 ENCOUNTER — TELEPHONE (OUTPATIENT)
Dept: PRIMARY CARE | Facility: CLINIC | Age: 73
End: 2023-12-04
Payer: MEDICARE

## 2023-12-04 NOTE — TELEPHONE ENCOUNTER
----- Message from Helio Sparks MD sent at 12/2/2023  9:27 PM EST -----  Protein and blood detectable in urine, recheck early morning clean catch UA.  ----- Message -----  From: Bibiana Grady LPN  Sent: 12/1/2023  11:35 AM EST  To: Helio Sparks MD

## 2023-12-08 ENCOUNTER — ANCILLARY PROCEDURE (OUTPATIENT)
Dept: RADIOLOGY | Facility: CLINIC | Age: 73
End: 2023-12-08
Payer: MEDICARE

## 2023-12-08 DIAGNOSIS — J44.9 CHRONIC OBSTRUCTIVE PULMONARY DISEASE, UNSPECIFIED COPD TYPE (MULTI): ICD-10-CM

## 2023-12-08 PROCEDURE — 71250 CT THORAX DX C-: CPT | Performed by: RADIOLOGY

## 2023-12-08 PROCEDURE — 71250 CT THORAX DX C-: CPT

## 2024-01-29 ENCOUNTER — APPOINTMENT (OUTPATIENT)
Dept: PULMONOLOGY | Facility: CLINIC | Age: 74
End: 2024-01-29
Payer: MEDICARE

## 2024-02-18 ENCOUNTER — HOSPITAL ENCOUNTER (INPATIENT)
Facility: HOSPITAL | Age: 74
LOS: 2 days | Discharge: HOME | DRG: 291 | End: 2024-02-21
Attending: EMERGENCY MEDICINE | Admitting: INTERNAL MEDICINE
Payer: MEDICARE

## 2024-02-18 ENCOUNTER — APPOINTMENT (OUTPATIENT)
Dept: RADIOLOGY | Facility: HOSPITAL | Age: 74
DRG: 291 | End: 2024-02-18
Payer: MEDICARE

## 2024-02-18 ENCOUNTER — CLINICAL SUPPORT (OUTPATIENT)
Dept: CARDIOLOGY | Facility: CLINIC | Age: 74
End: 2024-02-18
Payer: MEDICARE

## 2024-02-18 ENCOUNTER — APPOINTMENT (OUTPATIENT)
Dept: CARDIOLOGY | Facility: HOSPITAL | Age: 74
DRG: 291 | End: 2024-02-18
Payer: MEDICARE

## 2024-02-18 DIAGNOSIS — I50.21 ACUTE SYSTOLIC (CONGESTIVE) HEART FAILURE (MULTI): ICD-10-CM

## 2024-02-18 DIAGNOSIS — J44.1 COPD EXACERBATION (MULTI): ICD-10-CM

## 2024-02-18 DIAGNOSIS — I50.1: ICD-10-CM

## 2024-02-18 DIAGNOSIS — I50.9 CONGESTIVE HEART FAILURE, UNSPECIFIED HF CHRONICITY, UNSPECIFIED HEART FAILURE TYPE (MULTI): ICD-10-CM

## 2024-02-18 DIAGNOSIS — R06.02 SHORTNESS OF BREATH: Primary | ICD-10-CM

## 2024-02-18 DIAGNOSIS — I50.9 ACUTE CONGESTIVE HEART FAILURE, UNSPECIFIED HEART FAILURE TYPE (MULTI): ICD-10-CM

## 2024-02-18 LAB
ALBUMIN SERPL BCP-MCNC: 4.4 G/DL (ref 3.4–5)
ALP SERPL-CCNC: 57 U/L (ref 33–136)
ALT SERPL W P-5'-P-CCNC: 15 U/L (ref 10–52)
ANION GAP SERPL CALC-SCNC: 13 MMOL/L (ref 10–20)
AST SERPL W P-5'-P-CCNC: 19 U/L (ref 9–39)
BASOPHILS # BLD AUTO: 0.05 X10*3/UL (ref 0–0.1)
BASOPHILS NFR BLD AUTO: 0.7 %
BILIRUB SERPL-MCNC: 0.9 MG/DL (ref 0–1.2)
BNP SERPL-MCNC: >4700 PG/ML (ref 0–99)
BUN SERPL-MCNC: 32 MG/DL (ref 6–23)
CALCIUM SERPL-MCNC: 8.8 MG/DL (ref 8.6–10.3)
CARDIAC TROPONIN I PNL SERPL HS: 23 NG/L (ref 0–20)
CARDIAC TROPONIN I PNL SERPL HS: 26 NG/L (ref 0–20)
CHLORIDE SERPL-SCNC: 103 MMOL/L (ref 98–107)
CO2 SERPL-SCNC: 26 MMOL/L (ref 21–32)
CREAT SERPL-MCNC: 1.99 MG/DL (ref 0.5–1.3)
D DIMER PPP FEU-MCNC: ABNORMAL NG/ML FEU
EGFRCR SERPLBLD CKD-EPI 2021: 34 ML/MIN/1.73M*2
EOSINOPHIL # BLD AUTO: 0.08 X10*3/UL (ref 0–0.4)
EOSINOPHIL NFR BLD AUTO: 1.1 %
ERYTHROCYTE [DISTWIDTH] IN BLOOD BY AUTOMATED COUNT: 14.5 % (ref 11.5–14.5)
FLUAV RNA RESP QL NAA+PROBE: NOT DETECTED
FLUBV RNA RESP QL NAA+PROBE: NOT DETECTED
GLUCOSE SERPL-MCNC: 98 MG/DL (ref 74–99)
HCT VFR BLD AUTO: 36.6 % (ref 41–52)
HGB BLD-MCNC: 11.8 G/DL (ref 13.5–17.5)
IMM GRANULOCYTES # BLD AUTO: 0.03 X10*3/UL (ref 0–0.5)
IMM GRANULOCYTES NFR BLD AUTO: 0.4 % (ref 0–0.9)
LYMPHOCYTES # BLD AUTO: 1.37 X10*3/UL (ref 0.8–3)
LYMPHOCYTES NFR BLD AUTO: 18.8 %
MCH RBC QN AUTO: 31.1 PG (ref 26–34)
MCHC RBC AUTO-ENTMCNC: 32.2 G/DL (ref 32–36)
MCV RBC AUTO: 97 FL (ref 80–100)
MONOCYTES # BLD AUTO: 0.51 X10*3/UL (ref 0.05–0.8)
MONOCYTES NFR BLD AUTO: 7 %
NEUTROPHILS # BLD AUTO: 5.25 X10*3/UL (ref 1.6–5.5)
NEUTROPHILS NFR BLD AUTO: 72 %
NRBC BLD-RTO: 0 /100 WBCS (ref 0–0)
PLATELET # BLD AUTO: 224 X10*3/UL (ref 150–450)
POTASSIUM SERPL-SCNC: 4 MMOL/L (ref 3.5–5.3)
PROT SERPL-MCNC: 8.1 G/DL (ref 6.4–8.2)
RBC # BLD AUTO: 3.79 X10*6/UL (ref 4.5–5.9)
RSV RNA RESP QL NAA+PROBE: NOT DETECTED
SARS-COV-2 RNA RESP QL NAA+PROBE: NOT DETECTED
SODIUM SERPL-SCNC: 138 MMOL/L (ref 136–145)
WBC # BLD AUTO: 7.3 X10*3/UL (ref 4.4–11.3)

## 2024-02-18 PROCEDURE — 99285 EMERGENCY DEPT VISIT HI MDM: CPT | Mod: 25

## 2024-02-18 PROCEDURE — 87637 SARSCOV2&INF A&B&RSV AMP PRB: CPT | Performed by: EMERGENCY MEDICINE

## 2024-02-18 PROCEDURE — 96365 THER/PROPH/DIAG IV INF INIT: CPT

## 2024-02-18 PROCEDURE — 85379 FIBRIN DEGRADATION QUANT: CPT | Performed by: EMERGENCY MEDICINE

## 2024-02-18 PROCEDURE — 71275 CT ANGIOGRAPHY CHEST: CPT | Performed by: STUDENT IN AN ORGANIZED HEALTH CARE EDUCATION/TRAINING PROGRAM

## 2024-02-18 PROCEDURE — 84484 ASSAY OF TROPONIN QUANT: CPT | Performed by: EMERGENCY MEDICINE

## 2024-02-18 PROCEDURE — 2550000001 HC RX 255 CONTRASTS: Performed by: EMERGENCY MEDICINE

## 2024-02-18 PROCEDURE — 2500000004 HC RX 250 GENERAL PHARMACY W/ HCPCS (ALT 636 FOR OP/ED): Performed by: EMERGENCY MEDICINE

## 2024-02-18 PROCEDURE — 83880 ASSAY OF NATRIURETIC PEPTIDE: CPT | Performed by: EMERGENCY MEDICINE

## 2024-02-18 PROCEDURE — 93005 ELECTROCARDIOGRAM TRACING: CPT

## 2024-02-18 PROCEDURE — 36415 COLL VENOUS BLD VENIPUNCTURE: CPT | Performed by: EMERGENCY MEDICINE

## 2024-02-18 PROCEDURE — 80053 COMPREHEN METABOLIC PANEL: CPT | Performed by: EMERGENCY MEDICINE

## 2024-02-18 PROCEDURE — 71045 X-RAY EXAM CHEST 1 VIEW: CPT | Performed by: RADIOLOGY

## 2024-02-18 PROCEDURE — 85025 COMPLETE CBC W/AUTO DIFF WBC: CPT | Performed by: EMERGENCY MEDICINE

## 2024-02-18 PROCEDURE — 71275 CT ANGIOGRAPHY CHEST: CPT

## 2024-02-18 PROCEDURE — 2500000002 HC RX 250 W HCPCS SELF ADMINISTERED DRUGS (ALT 637 FOR MEDICARE OP, ALT 636 FOR OP/ED): Mod: MUE | Performed by: EMERGENCY MEDICINE

## 2024-02-18 PROCEDURE — 94640 AIRWAY INHALATION TREATMENT: CPT | Mod: MUE

## 2024-02-18 PROCEDURE — 71045 X-RAY EXAM CHEST 1 VIEW: CPT

## 2024-02-18 PROCEDURE — 96375 TX/PRO/DX INJ NEW DRUG ADDON: CPT

## 2024-02-18 RX ORDER — MAGNESIUM SULFATE HEPTAHYDRATE 40 MG/ML
2 INJECTION, SOLUTION INTRAVENOUS ONCE
Status: COMPLETED | OUTPATIENT
Start: 2024-02-18 | End: 2024-02-18

## 2024-02-18 RX ORDER — MAGNESIUM SULFATE HEPTAHYDRATE 40 MG/ML
2 INJECTION, SOLUTION INTRAVENOUS ONCE
Status: DISCONTINUED | OUTPATIENT
Start: 2024-02-18 | End: 2024-02-18

## 2024-02-18 RX ORDER — IPRATROPIUM BROMIDE AND ALBUTEROL SULFATE 2.5; .5 MG/3ML; MG/3ML
3 SOLUTION RESPIRATORY (INHALATION)
Status: COMPLETED | OUTPATIENT
Start: 2024-02-18 | End: 2024-02-18

## 2024-02-18 RX ADMIN — MAGNESIUM SULFATE HEPTAHYDRATE 2 G: 40 INJECTION, SOLUTION INTRAVENOUS at 18:27

## 2024-02-18 RX ADMIN — IPRATROPIUM BROMIDE AND ALBUTEROL SULFATE 3 ML: 2.5; .5 SOLUTION RESPIRATORY (INHALATION) at 18:12

## 2024-02-18 RX ADMIN — METHYLPREDNISOLONE SODIUM SUCCINATE 125 MG: 125 INJECTION, POWDER, FOR SOLUTION INTRAMUSCULAR; INTRAVENOUS at 18:11

## 2024-02-18 RX ADMIN — IPRATROPIUM BROMIDE AND ALBUTEROL SULFATE 3 ML: 2.5; .5 SOLUTION RESPIRATORY (INHALATION) at 18:26

## 2024-02-18 RX ADMIN — IOHEXOL 75 ML: 350 INJECTION, SOLUTION INTRAVENOUS at 23:27

## 2024-02-18 RX ADMIN — IPRATROPIUM BROMIDE AND ALBUTEROL SULFATE 3 ML: 2.5; .5 SOLUTION RESPIRATORY (INHALATION) at 18:16

## 2024-02-18 ASSESSMENT — ENCOUNTER SYMPTOMS
SORE THROAT: 0
COUGH: 1
ABDOMINAL PAIN: 0
BACK PAIN: 0
CHILLS: 0
PALPITATIONS: 0
SHORTNESS OF BREATH: 1
FEVER: 0
SEIZURES: 0
DYSURIA: 0
ARTHRALGIAS: 0
COLOR CHANGE: 0
EYE PAIN: 0
VOMITING: 0
HEMATURIA: 0

## 2024-02-18 ASSESSMENT — COLUMBIA-SUICIDE SEVERITY RATING SCALE - C-SSRS
2. HAVE YOU ACTUALLY HAD ANY THOUGHTS OF KILLING YOURSELF?: NO
6. HAVE YOU EVER DONE ANYTHING, STARTED TO DO ANYTHING, OR PREPARED TO DO ANYTHING TO END YOUR LIFE?: NO
1. IN THE PAST MONTH, HAVE YOU WISHED YOU WERE DEAD OR WISHED YOU COULD GO TO SLEEP AND NOT WAKE UP?: NO

## 2024-02-18 NOTE — ED PROVIDER NOTES
HPI   Chief Complaint   Patient presents with    Shortness of Breath     Hx of COPD but feels like inhaler hasn't been helping since yesterday. Pt states he drove from [place] & arrived home last night.          73-year-old male, history of COPD CHF, cataracts, CKD, prior pneumonias, presents with a feeling of shortness of breath.  Started last night.  States rescue inhaler that he has for COPD does not feel like it is helping.  He feels his heart rate is faster than baseline for him as well.  Chronic cough but no new sputum hemoptysis.  No chest pain.  No fevers are noted.  No nausea vomiting diarrhea constipation.  Slight anxiety/insomnia issue which is worse than his baseline he states.  Just drove back from [place] over the last 3 days with this dyspnea now worsening.                          [place] Coma Scale Score: 15                  Patient History   Past Medical History:   Diagnosis Date    AICD (automatic cardioverter/defibrillator) present 04/06/2023    Atherosclerotic heart disease of native coronary artery without angina pectoris 10/28/2015    CHF (congestive heart failure) (CMS/[facility])     Chronic kidney disease, stage 3 unspecified (CMS/[facility]) 12/19/2019    Community acquired pneumonia 04/06/2023    Cortical age-related cataract, left eye 03/31/2016    Dysphagia, unspecified 08/20/2015    Dysuria 04/06/2023    Emphysema/COPD (CMS/[facility]) 04/06/2023    Essential (primary) hypertension 02/26/2018    Gastric ulcer 04/06/2023    -H pylori     Gastritis 04/06/2023    Hemoptysis 05/17/2016    HFrEF (heart failure with reduced ejection fraction) (CMS/[facility]) 04/06/2023    Kidney stone on left side 04/06/2023    Melena 04/06/2023    NUD (nonulcer dyspepsia) 04/06/2023    Personal history of peptic ulcer disease 05/23/2019    Personal history of pneumonia (recurrent) 03/07/2016    Prurigo nodularis 04/06/2023    Urinary retention 04/06/2023     Past Surgical History:   Procedure Laterality Date    CATARACT EXTRACTION  Bilateral 2016    Cataract Surgery    CORONARY ARTERY BYPASS GRAFT  2018    CABG     Family History   Problem Relation Name Age of Onset    Coronary artery disease Mother      Hypertension Father       Social History     Tobacco Use    Smoking status: Former     Types: Cigarettes     Quit date:      Years since quittin.1    Smokeless tobacco: Never   Substance Use Topics    Alcohol use: Not Currently    Drug use: Not Currently       Review of Systems   Constitutional:  Negative for chills and fever.   HENT:  Negative for ear pain and sore throat.    Eyes:  Negative for pain and visual disturbance.   Respiratory:  Positive for cough and shortness of breath.    Cardiovascular:  Negative for chest pain and palpitations.   Gastrointestinal:  Negative for abdominal pain and vomiting.   Genitourinary:  Negative for dysuria and hematuria.   Musculoskeletal:  Negative for arthralgias and back pain.   Skin:  Negative for color change and rash.   Neurological:  Negative for seizures and syncope.   All other systems reviewed and are negative.       Physical Exam   ED Triage Vitals   Temp Pulse Resp BP   -- -- -- --      SpO2 Temp src Heart Rate Source Patient Position   -- -- -- --      BP Location FiO2 (%)     -- --       Physical Exam  Vitals reviewed.   Constitutional:       General: He is not in acute distress.     Appearance: He is well-developed.   HENT:      Head: Normocephalic and atraumatic.      Right Ear: External ear normal.      Left Ear: External ear normal.      Nose: Nose normal.      Mouth/Throat:      Mouth: Mucous membranes are moist.      Pharynx: Oropharynx is clear.   Eyes:      Conjunctiva/sclera: Conjunctivae normal.      Pupils: Pupils are equal, round, and reactive to light.   Neck:      Vascular: No JVD.   Cardiovascular:      Rate and Rhythm: Normal rate and regular rhythm.      Pulses: Normal pulses.   Pulmonary:      Effort: Pulmonary effort is normal. No accessory muscle  usage or respiratory distress.      Breath sounds: Decreased breath sounds and wheezing present.      Comments: Rare expiratory wheeze.  No overt respiratory distress or overt increased work of breathing.  No definitive consolidating or lateralizing breath sounds.  Abdominal:      General: Abdomen is flat. There is no distension.      Palpations: Abdomen is soft. There is no mass.      Tenderness: There is no abdominal tenderness.   Musculoskeletal:         General: Normal range of motion.      Cervical back: Normal range of motion and neck supple.      Comments: No leg pain or swelling or asymmetry noted.   Lymphadenopathy:      Cervical: No cervical adenopathy.   Skin:     General: Skin is warm and dry.      Capillary Refill: Capillary refill takes less than 2 seconds.      Comments: No zoster rash seen   Neurological:      General: No focal deficit present.      Mental Status: He is alert. Mental status is at baseline.   Psychiatric:         Mood and Affect: Mood normal.                 ECG if performed, ordered and interpreted by ED physician:        Labs Reviewed   CBC WITH AUTO DIFFERENTIAL - Abnormal       Result Value    WBC 7.3      nRBC 0.0      RBC 3.79 (*)     Hemoglobin 11.8 (*)     Hematocrit 36.6 (*)     MCV 97      MCH 31.1      MCHC 32.2      RDW 14.5      Platelets 224      Neutrophils % 72.0      Immature Granulocytes %, Automated 0.4      Lymphocytes % 18.8      Monocytes % 7.0      Eosinophils % 1.1      Basophils % 0.7      Neutrophils Absolute 5.25      Immature Granulocytes Absolute, Automated 0.03      Lymphocytes Absolute 1.37      Monocytes Absolute 0.51      Eosinophils Absolute 0.08      Basophils Absolute 0.05     COMPREHENSIVE METABOLIC PANEL - Abnormal    Glucose 98      Sodium 138      Potassium 4.0      Chloride 103      Bicarbonate 26      Anion Gap 13      Urea Nitrogen 32 (*)     Creatinine 1.99 (*)     eGFR 34 (*)     Calcium 8.8      Albumin 4.4      Alkaline Phosphatase 57       Total Protein 8.1      AST 19      Bilirubin, Total 0.9      ALT 15     D-DIMER, VTE EXCLUSION - Abnormal    D-Dimer, Quantitative VTE Exclusion 22,606 (*)     Narrative:     The VTE Exclusion D-Dimer assay is reported in ng/mL Fibrinogen Equivalent Units (FEU).    Per 's instructions for use, a value of less than 500 ng/mL (FEU) may help to exclude DVT or PE in outpatients when the assay is used with a clinical pretest probability assessment.(AE must utilize and document eCalc 'Wells Score Deep Vein Thrombosis Risk' for DVT exclusion only. Emergency Department should utilize  Guidelines for Emergency Department Use of the VTE Exclusion D-Dimer and Clinical Pretest probability assessment model for DVT or PE exclusion.)   B-TYPE NATRIURETIC PEPTIDE - Abnormal    BNP >4,700 (*)     Narrative:        <100 pg/mL - Heart failure unlikely  100-299 pg/mL - Intermediate probability of acute heart                  failure exacerbation. Correlate with clinical                  context and patient history.    >=300 pg/mL - Heart Failure likely. Correlate with clinical                  context and patient history.    BNP testing is performed using different testing methodology at [facility] than at other [facility]. Direct result comparisons should only be made within the same method.      SERIAL TROPONIN-INITIAL - Abnormal    Troponin I, High Sensitivity 26 (*)     Narrative:     Less than 99th percentile of normal range cutoff-  Female and children under 18 years old <14 ng/L; Male <21 ng/L: Negative  Repeat testing should be performed if clinically indicated.     Female and children under 18 years old 14-50 ng/L; Male 21-50 ng/L:  Consistent with possible cardiac damage and possible increased clinical   risk. Serial measurements may help to assess extent of myocardial damage.     >50 ng/L: Consistent with cardiac damage, increased clinical risk and  myocardial infarction. Serial  measurements may help assess extent of   myocardial damage.      NOTE: Children less than 1 year old may have higher baseline troponin   levels and results should be interpreted in conjunction with the overall   clinical context.     NOTE: Troponin I testing is performed using a different   testing methodology at [facility] than at other   [facility]. Direct result comparisons should only   be made within the same method.   SERIAL TROPONIN, 1 HOUR - Abnormal    Troponin I, High Sensitivity 23 (*)     Narrative:     Less than 99th percentile of normal range cutoff-  Female and children under 18 years old <14 ng/L; Male <21 ng/L: Negative  Repeat testing should be performed if clinically indicated.     Female and children under 18 years old 14-50 ng/L; Male 21-50 ng/L:  Consistent with possible cardiac damage and possible increased clinical   risk. Serial measurements may help to assess extent of myocardial damage.     >50 ng/L: Consistent with cardiac damage, increased clinical risk and  myocardial infarction. Serial measurements may help assess extent of   myocardial damage.      NOTE: Children less than 1 year old may have higher baseline troponin   levels and results should be interpreted in conjunction with the overall   clinical context.     NOTE: Troponin I testing is performed using a different   testing methodology at [facility] than at other   [facility]. Direct result comparisons should only   be made within the same method.   COMPREHENSIVE METABOLIC PANEL - Abnormal    Glucose 135 (*)     Sodium 137      Potassium 4.1      Chloride 105      Bicarbonate 25      Anion Gap 11      Urea Nitrogen 28 (*)     Creatinine 1.51 (*)     eGFR 48 (*)     Calcium 8.4 (*)     Albumin 3.7      Alkaline Phosphatase 45      Total Protein 6.8      AST 14      Bilirubin, Total 0.7      ALT 11     CBC WITH AUTO DIFFERENTIAL - Abnormal    WBC 3.7 (*)     nRBC 0.0      RBC 3.15 (*)      Hemoglobin 10.0 (*)     Hematocrit 30.6 (*)     MCV 97      MCH 31.7      MCHC 32.7      RDW 14.4      Platelets 167      Neutrophils % 80.7      Immature Granulocytes %, Automated 0.5      Lymphocytes % 14.4      Monocytes % 4.1      Eosinophils % 0.0      Basophils % 0.3      Neutrophils Absolute 2.96      Immature Granulocytes Absolute, Automated 0.02      Lymphocytes Absolute 0.53 (*)     Monocytes Absolute 0.15      Eosinophils Absolute 0.00      Basophils Absolute 0.01     URINALYSIS WITH REFLEX MICROSCOPIC - Abnormal    Color, Urine Straw      Appearance, Urine Clear      Specific Gravity, Urine 1.011      pH, Urine 5.0      Protein, Urine NEGATIVE      Glucose, Urine NEGATIVE      Blood, Urine SMALL (1+) (*)     Ketones, Urine NEGATIVE      Bilirubin, Urine NEGATIVE      Urobilinogen, Urine <2.0      Nitrite, Urine NEGATIVE      Leukocyte Esterase, Urine NEGATIVE     SARS-COV-2 PCR - Normal    Coronavirus 2019, PCR Not Detected      Narrative:     This assay has received FDA Emergency Use Authorization (EUA) and is only authorized for the duration of time that circumstances exist to justify the authorization of the emergency use of in vitro diagnostic tests for the detection of SARS-CoV-2 virus and/or diagnosis of COVID-19 infection under section 564(b)(1) of the Act, 21 U.S.C. 360bbb-3(b)(1). This assay is an in vitro diagnostic nucleic acid amplification test for the qualitative detection of SARS-CoV-2 from nasopharyngeal specimens and has been validated for use at [facility]. Negative results do not preclude COVID-19 infections and should not be used as the sole basis for diagnosis, treatment, or other management decisions.     INFLUENZA A AND B PCR - Normal    Flu A Result Not Detected      Flu B Result Not Detected      Narrative:     This assay is an in vitro diagnostic multiplex nucleic acid amplification test for the detection and discrimination of Influenza A & B from  nasopharyngeal specimens, and has been validated for use at [facility]. Negative results do not preclude Influenza A/B infections, and should not be used as the sole basis for diagnosis, treatment, or other management decisions. If Influenza A/B and RSV PCR results are negative, testing for Parainfluenza virus, Adenovirus and Metapneumovirus is routinely performed for [facility] pediatric oncology and intensive care inpatients, and is available on other patients by placing an add-on request.   RSV PCR - Normal    RSV PCR Not Detected      Narrative:     This assay is an FDA-cleared, in vitro diagnostic nucleic acid amplification test for the detection of RSV from nasopharyngeal specimens, and has been validated for use at [facility]. Negative results do not preclude RSV infections, and should not be used as the sole basis for diagnosis, treatment, or other management decisions. If Influenza A/B and RSV PCR results are negative, testing for Parainfluenza virus, Adenovirus and Metapneumovirus is routinely performed for pediatric oncology and intensive care inpatients at [facility], and is available on other patients by placing an add-on request.       MAGNESIUM - Normal    Magnesium 2.00     MICROSCOPIC ONLY, URINE - Normal    WBC, Urine NONE      RBC, Urine 1-2     TROPONIN SERIES- (INITIAL, 1 HR)    Narrative:     The following orders were created for panel order Troponin I Series, High Sensitivity (0, 1 HR).  Procedure                               Abnormality         Status                     ---------                               -----------         ------                     Troponin I, High Sensiti...[[id number]]  Abnormal            Final result               Troponin, High Sensitivi...[[id number]]  Abnormal            Final result                 Please view results for these tests on the individual orders.   CBC   BASIC METABOLIC PANEL          CT angio chest for pulmonary  embolism   Final Result   1.  No evidence of pulmonary emboli. There is similar appearance of   small right pleural effusion. Interval development of diffuse   interseptal thickening suggestive of pulmonary venous congestion.   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 Cardiology follow-up is recommended.   Enlarged main pulmonary trunk suggestive of pulmonary arterial   hypertension.   2. Moderate centrilobular emphysema             Signed by: Colby Gee 2/19/2024 12:24 AM   Dictation workstation:   [id number]      XR chest 1 view   Final Result   1.  Features suggesting pulmonary edema with cardiac enlargement.        Follow-up is advised        MACRO:   None        Signed by: Wojciech Ontiveros 2/18/2024 6:57 PM   Dictation workstation:   [id number]               ED Course & [facility]     ED Medication Administration from 02/18/2024 1603 to 02/19/2024 0220         Date/Time Order Dose Route Action Action by     02/18/2024 1750 EST magnesium sulfate bolus from bag 2 g -- intravenous Canceled Entry Charlotte,      02/18/2024 1811 EST methylPREDNISolone sod succinate (SOLU-Medrol) injection 125 mg 125 mg intravenous Given Charlotte,      02/18/2024 1812 EST ipratropium-albuteroL (Duo-Neb) 0.5-2.5 mg/3 mL nebulizer solution 3 mL 3 mL nebulization Given Isabel, L     02/18/2024 1815 EST magnesium sulfate IV 2 g 2 g intravenous Not Given Charlotte,      02/18/2024 1816 EST ipratropium-albuteroL (Duo-Neb) 0.5-2.5 mg/3 mL nebulizer solution 3 mL 3 mL nebulization Given Isabel, L     02/18/2024 1826 EST ipratropium-albuteroL (Duo-Neb) 0.5-2.5 mg/3 mL nebulizer solution 3 mL 3 mL nebulization Given Isabel, L     02/18/2024 1827 EST magnesium sulfate IV 2 g 2 g intravenous New Bag Charlotte, G     02/18/2024 1935 EST magnesium sulfate IV 2 g 0 g intravenous Stopped Charlotte, G 02/18/2024 2327 EST iohexol  (OMNIPaque) 350 mg iodine/mL solution 75 mL 75 mL intravenous Given BROWN Donahue     02/19/2024 0045 EST furosemide (Lasix) injection 80 mg 80 mg intravenous Not Given BROWN Carrillo     02/19/2024 0210 EST docusate sodium (Colace) capsule 100 mg 100 mg oral Not Given BROWN Carrillo                   ED Course as of 02/19/24 2039   Sun Feb 18, 2024 1751 ECG 12 Lead  EKG ordered and interpreted by ED physician demonstrates sinus rhythm, 71 bpm.  Nonspecific conduction delay, nonspecific T wave inversions no change from prior ECG.  No overt ischemic findings. [KS]   Mon Feb 19, 2024   0053 Patient CT angio shows no pulm embolism and did show CHF.  Patient was given intravenous Lasix 80 mg, and patient will be hospitalized for further care. [MT]      ED Course User Index  [KS] Joseph Deng MD MPH  [MT] Nadira Shea MD         Diagnoses as of 02/19/24 2039   Shortness of breath   Acute congestive heart failure, unspecified heart failure type (CMS/HCC)   COPD exacerbation (CMS/HCC)   Congestive heart failure, unspecified HF chronicity, unspecified heart failure type (CMS/HCC)       Medical Decision Making  Shortness of breath with known history of COPD issues.  Will try treatment for COPD, but evaluate for the possibility of congestive heart failure, PE, pneumonia, pneumothorax.          Procedure  Procedures                 Joseph Deng MD MPH  02/18/24 1751       Joseph Deng MD MPH  02/1950       Joseph Deng MD MPH  02/19/24 2039

## 2024-02-19 DIAGNOSIS — I50.23 ACUTE ON CHRONIC SYSTOLIC HEART FAILURE (MULTI): ICD-10-CM

## 2024-02-19 PROBLEM — I25.10 CAD (CORONARY ARTERY DISEASE): Chronic | Status: ACTIVE | Noted: 2023-04-06

## 2024-02-19 PROBLEM — N18.31 STAGE 3A CHRONIC KIDNEY DISEASE (MULTI): Chronic | Status: ACTIVE | Noted: 2023-04-06

## 2024-02-19 PROBLEM — Z95.810 AICD (AUTOMATIC CARDIOVERTER/DEFIBRILLATOR) PRESENT: Chronic | Status: ACTIVE | Noted: 2023-04-06

## 2024-02-19 PROBLEM — J43.9 EMPHYSEMA/COPD (MULTI): Chronic | Status: ACTIVE | Noted: 2023-04-06

## 2024-02-19 PROBLEM — R06.02 SHORTNESS OF BREATH: Status: ACTIVE | Noted: 2024-02-19

## 2024-02-19 PROBLEM — I10 BENIGN ESSENTIAL HYPERTENSION: Chronic | Status: ACTIVE | Noted: 2023-04-06

## 2024-02-19 PROBLEM — I50.20 HFREF (HEART FAILURE WITH REDUCED EJECTION FRACTION) (MULTI): Chronic | Status: ACTIVE | Noted: 2023-04-06

## 2024-02-19 LAB
ALBUMIN SERPL BCP-MCNC: 3.7 G/DL (ref 3.4–5)
ALP SERPL-CCNC: 45 U/L (ref 33–136)
ALT SERPL W P-5'-P-CCNC: 11 U/L (ref 10–52)
ANION GAP SERPL CALC-SCNC: 11 MMOL/L (ref 10–20)
APPEARANCE UR: CLEAR
AST SERPL W P-5'-P-CCNC: 14 U/L (ref 9–39)
BASOPHILS # BLD AUTO: 0.01 X10*3/UL (ref 0–0.1)
BASOPHILS NFR BLD AUTO: 0.3 %
BILIRUB SERPL-MCNC: 0.7 MG/DL (ref 0–1.2)
BILIRUB UR STRIP.AUTO-MCNC: NEGATIVE MG/DL
BUN SERPL-MCNC: 28 MG/DL (ref 6–23)
CALCIUM SERPL-MCNC: 8.4 MG/DL (ref 8.6–10.3)
CHLORIDE SERPL-SCNC: 105 MMOL/L (ref 98–107)
CO2 SERPL-SCNC: 25 MMOL/L (ref 21–32)
COLOR UR: ABNORMAL
CREAT SERPL-MCNC: 1.51 MG/DL (ref 0.5–1.3)
EGFRCR SERPLBLD CKD-EPI 2021: 48 ML/MIN/1.73M*2
EOSINOPHIL # BLD AUTO: 0 X10*3/UL (ref 0–0.4)
EOSINOPHIL NFR BLD AUTO: 0 %
ERYTHROCYTE [DISTWIDTH] IN BLOOD BY AUTOMATED COUNT: 14.4 % (ref 11.5–14.5)
GLUCOSE SERPL-MCNC: 135 MG/DL (ref 74–99)
GLUCOSE UR STRIP.AUTO-MCNC: NEGATIVE MG/DL
HCT VFR BLD AUTO: 30.6 % (ref 41–52)
HGB BLD-MCNC: 10 G/DL (ref 13.5–17.5)
IMM GRANULOCYTES # BLD AUTO: 0.02 X10*3/UL (ref 0–0.5)
IMM GRANULOCYTES NFR BLD AUTO: 0.5 % (ref 0–0.9)
KETONES UR STRIP.AUTO-MCNC: NEGATIVE MG/DL
LEUKOCYTE ESTERASE UR QL STRIP.AUTO: NEGATIVE
LYMPHOCYTES # BLD AUTO: 0.53 X10*3/UL (ref 0.8–3)
LYMPHOCYTES NFR BLD AUTO: 14.4 %
MAGNESIUM SERPL-MCNC: 2 MG/DL (ref 1.6–2.4)
MCH RBC QN AUTO: 31.7 PG (ref 26–34)
MCHC RBC AUTO-ENTMCNC: 32.7 G/DL (ref 32–36)
MCV RBC AUTO: 97 FL (ref 80–100)
MONOCYTES # BLD AUTO: 0.15 X10*3/UL (ref 0.05–0.8)
MONOCYTES NFR BLD AUTO: 4.1 %
NEUTROPHILS # BLD AUTO: 2.96 X10*3/UL (ref 1.6–5.5)
NEUTROPHILS NFR BLD AUTO: 80.7 %
NITRITE UR QL STRIP.AUTO: NEGATIVE
NRBC BLD-RTO: 0 /100 WBCS (ref 0–0)
PH UR STRIP.AUTO: 5 [PH]
PLATELET # BLD AUTO: 167 X10*3/UL (ref 150–450)
POTASSIUM SERPL-SCNC: 4.1 MMOL/L (ref 3.5–5.3)
PROT SERPL-MCNC: 6.8 G/DL (ref 6.4–8.2)
PROT UR STRIP.AUTO-MCNC: NEGATIVE MG/DL
RBC # BLD AUTO: 3.15 X10*6/UL (ref 4.5–5.9)
RBC # UR STRIP.AUTO: ABNORMAL /UL
RBC #/AREA URNS AUTO: NORMAL /HPF
SODIUM SERPL-SCNC: 137 MMOL/L (ref 136–145)
SP GR UR STRIP.AUTO: 1.01
UROBILINOGEN UR STRIP.AUTO-MCNC: <2 MG/DL
WBC # BLD AUTO: 3.7 X10*3/UL (ref 4.4–11.3)
WBC #/AREA URNS AUTO: NORMAL /HPF

## 2024-02-19 PROCEDURE — 2500000001 HC RX 250 WO HCPCS SELF ADMINISTERED DRUGS (ALT 637 FOR MEDICARE OP): Performed by: INTERNAL MEDICINE

## 2024-02-19 PROCEDURE — 81001 URINALYSIS AUTO W/SCOPE: CPT | Performed by: INTERNAL MEDICINE

## 2024-02-19 PROCEDURE — 94640 AIRWAY INHALATION TREATMENT: CPT | Mod: MUE

## 2024-02-19 PROCEDURE — 2500000002 HC RX 250 W HCPCS SELF ADMINISTERED DRUGS (ALT 637 FOR MEDICARE OP, ALT 636 FOR OP/ED): Performed by: PHYSICIAN ASSISTANT

## 2024-02-19 PROCEDURE — 99222 1ST HOSP IP/OBS MODERATE 55: CPT | Performed by: INTERNAL MEDICINE

## 2024-02-19 PROCEDURE — 84075 ASSAY ALKALINE PHOSPHATASE: CPT | Performed by: PHYSICIAN ASSISTANT

## 2024-02-19 PROCEDURE — 36415 COLL VENOUS BLD VENIPUNCTURE: CPT | Performed by: PHYSICIAN ASSISTANT

## 2024-02-19 PROCEDURE — 83735 ASSAY OF MAGNESIUM: CPT | Performed by: PHYSICIAN ASSISTANT

## 2024-02-19 PROCEDURE — 2500000002 HC RX 250 W HCPCS SELF ADMINISTERED DRUGS (ALT 637 FOR MEDICARE OP, ALT 636 FOR OP/ED): Mod: MUE | Performed by: STUDENT IN AN ORGANIZED HEALTH CARE EDUCATION/TRAINING PROGRAM

## 2024-02-19 PROCEDURE — 2500000004 HC RX 250 GENERAL PHARMACY W/ HCPCS (ALT 636 FOR OP/ED): Performed by: PHYSICIAN ASSISTANT

## 2024-02-19 PROCEDURE — 85025 COMPLETE CBC W/AUTO DIFF WBC: CPT | Performed by: PHYSICIAN ASSISTANT

## 2024-02-19 PROCEDURE — 2500000002 HC RX 250 W HCPCS SELF ADMINISTERED DRUGS (ALT 637 FOR MEDICARE OP, ALT 636 FOR OP/ED): Mod: MUE | Performed by: INTERNAL MEDICINE

## 2024-02-19 PROCEDURE — 94667 MNPJ CHEST WALL 1ST: CPT

## 2024-02-19 PROCEDURE — 99223 1ST HOSP IP/OBS HIGH 75: CPT | Performed by: INTERNAL MEDICINE

## 2024-02-19 PROCEDURE — 1200000002 HC GENERAL ROOM WITH TELEMETRY DAILY

## 2024-02-19 PROCEDURE — 2500000001 HC RX 250 WO HCPCS SELF ADMINISTERED DRUGS (ALT 637 FOR MEDICARE OP): Performed by: STUDENT IN AN ORGANIZED HEALTH CARE EDUCATION/TRAINING PROGRAM

## 2024-02-19 PROCEDURE — 99232 SBSQ HOSP IP/OBS MODERATE 35: CPT | Performed by: NURSE PRACTITIONER

## 2024-02-19 PROCEDURE — 94668 MNPJ CHEST WALL SBSQ: CPT

## 2024-02-19 RX ORDER — POLYETHYLENE GLYCOL 3350 17 G/17G
17 POWDER, FOR SOLUTION ORAL DAILY PRN
Status: DISCONTINUED | OUTPATIENT
Start: 2024-02-19 | End: 2024-02-21 | Stop reason: HOSPADM

## 2024-02-19 RX ORDER — SPIRONOLACTONE 25 MG/1
12.5 TABLET ORAL DAILY
Status: DISCONTINUED | OUTPATIENT
Start: 2024-02-19 | End: 2024-02-21 | Stop reason: HOSPADM

## 2024-02-19 RX ORDER — TALC
3 POWDER (GRAM) TOPICAL DAILY
Status: DISCONTINUED | OUTPATIENT
Start: 2024-02-19 | End: 2024-02-19

## 2024-02-19 RX ORDER — ACETAMINOPHEN 325 MG/1
950 TABLET ORAL EVERY 6 HOURS PRN
Status: DISCONTINUED | OUTPATIENT
Start: 2024-02-19 | End: 2024-02-21 | Stop reason: HOSPADM

## 2024-02-19 RX ORDER — ENOXAPARIN SODIUM 100 MG/ML
40 INJECTION SUBCUTANEOUS EVERY 24 HOURS
Status: DISCONTINUED | OUTPATIENT
Start: 2024-02-19 | End: 2024-02-21 | Stop reason: HOSPADM

## 2024-02-19 RX ORDER — ALBUTEROL SULFATE 90 UG/1
2 AEROSOL, METERED RESPIRATORY (INHALATION) EVERY 4 HOURS PRN
Status: DISCONTINUED | OUTPATIENT
Start: 2024-02-19 | End: 2024-02-19

## 2024-02-19 RX ORDER — ONDANSETRON 4 MG/1
4 TABLET, FILM COATED ORAL EVERY 8 HOURS PRN
Status: DISCONTINUED | OUTPATIENT
Start: 2024-02-19 | End: 2024-02-21 | Stop reason: HOSPADM

## 2024-02-19 RX ORDER — DOCUSATE SODIUM 100 MG/1
100 CAPSULE, LIQUID FILLED ORAL 2 TIMES DAILY
Status: DISCONTINUED | OUTPATIENT
Start: 2024-02-19 | End: 2024-02-21 | Stop reason: HOSPADM

## 2024-02-19 RX ORDER — IPRATROPIUM BROMIDE AND ALBUTEROL SULFATE 2.5; .5 MG/3ML; MG/3ML
3 SOLUTION RESPIRATORY (INHALATION) EVERY 4 HOURS PRN
Status: DISCONTINUED | OUTPATIENT
Start: 2024-02-19 | End: 2024-02-19

## 2024-02-19 RX ORDER — ROSUVASTATIN CALCIUM 20 MG/1
40 TABLET, COATED ORAL DAILY
Status: DISCONTINUED | OUTPATIENT
Start: 2024-02-19 | End: 2024-02-21 | Stop reason: HOSPADM

## 2024-02-19 RX ORDER — CLOPIDOGREL BISULFATE 75 MG/1
75 TABLET ORAL DAILY
Status: DISCONTINUED | OUTPATIENT
Start: 2024-02-19 | End: 2024-02-21 | Stop reason: HOSPADM

## 2024-02-19 RX ORDER — ONDANSETRON HYDROCHLORIDE 2 MG/ML
4 INJECTION, SOLUTION INTRAVENOUS EVERY 8 HOURS PRN
Status: DISCONTINUED | OUTPATIENT
Start: 2024-02-19 | End: 2024-02-21 | Stop reason: HOSPADM

## 2024-02-19 RX ORDER — FUROSEMIDE 10 MG/ML
80 INJECTION INTRAMUSCULAR; INTRAVENOUS ONCE
Status: DISCONTINUED | OUTPATIENT
Start: 2024-02-19 | End: 2024-02-20

## 2024-02-19 RX ORDER — IPRATROPIUM BROMIDE AND ALBUTEROL SULFATE 2.5; .5 MG/3ML; MG/3ML
3 SOLUTION RESPIRATORY (INHALATION)
Status: DISCONTINUED | OUTPATIENT
Start: 2024-02-19 | End: 2024-02-21 | Stop reason: HOSPADM

## 2024-02-19 RX ORDER — IPRATROPIUM BROMIDE AND ALBUTEROL SULFATE 2.5; .5 MG/3ML; MG/3ML
3 SOLUTION RESPIRATORY (INHALATION) EVERY 2 HOUR PRN
Status: DISCONTINUED | OUTPATIENT
Start: 2024-02-19 | End: 2024-02-21 | Stop reason: HOSPADM

## 2024-02-19 RX ORDER — ASPIRIN 81 MG/1
81 TABLET ORAL DAILY
Status: DISCONTINUED | OUTPATIENT
Start: 2024-02-19 | End: 2024-02-21 | Stop reason: HOSPADM

## 2024-02-19 RX ORDER — TAMSULOSIN HYDROCHLORIDE 0.4 MG/1
0.4 CAPSULE ORAL DAILY
Status: DISCONTINUED | OUTPATIENT
Start: 2024-02-19 | End: 2024-02-21 | Stop reason: HOSPADM

## 2024-02-19 RX ORDER — PANTOPRAZOLE SODIUM 40 MG/10ML
40 INJECTION, POWDER, LYOPHILIZED, FOR SOLUTION INTRAVENOUS
Status: DISCONTINUED | OUTPATIENT
Start: 2024-02-19 | End: 2024-02-21 | Stop reason: HOSPADM

## 2024-02-19 RX ORDER — PANTOPRAZOLE SODIUM 40 MG/1
40 TABLET, DELAYED RELEASE ORAL
Status: DISCONTINUED | OUTPATIENT
Start: 2024-02-19 | End: 2024-02-21 | Stop reason: HOSPADM

## 2024-02-19 RX ORDER — GUAIFENESIN 600 MG/1
600 TABLET, EXTENDED RELEASE ORAL EVERY 12 HOURS PRN
Status: DISCONTINUED | OUTPATIENT
Start: 2024-02-19 | End: 2024-02-21 | Stop reason: HOSPADM

## 2024-02-19 RX ORDER — ALBUTEROL SULFATE 0.83 MG/ML
2.5 SOLUTION RESPIRATORY (INHALATION) EVERY 4 HOURS PRN
Status: DISCONTINUED | OUTPATIENT
Start: 2024-02-19 | End: 2024-02-21 | Stop reason: HOSPADM

## 2024-02-19 RX ORDER — ACETAMINOPHEN 160 MG/5ML
650 SOLUTION ORAL EVERY 4 HOURS PRN
Status: DISCONTINUED | OUTPATIENT
Start: 2024-02-19 | End: 2024-02-21 | Stop reason: HOSPADM

## 2024-02-19 RX ORDER — CARVEDILOL 6.25 MG/1
6.25 TABLET ORAL 2 TIMES DAILY
Status: DISCONTINUED | OUTPATIENT
Start: 2024-02-19 | End: 2024-02-21 | Stop reason: HOSPADM

## 2024-02-19 RX ORDER — ACETAMINOPHEN 325 MG/1
650 TABLET ORAL EVERY 4 HOURS PRN
Status: DISCONTINUED | OUTPATIENT
Start: 2024-02-19 | End: 2024-02-21 | Stop reason: HOSPADM

## 2024-02-19 RX ORDER — ACETAMINOPHEN 650 MG/1
650 SUPPOSITORY RECTAL EVERY 4 HOURS PRN
Status: DISCONTINUED | OUTPATIENT
Start: 2024-02-19 | End: 2024-02-21 | Stop reason: HOSPADM

## 2024-02-19 RX ORDER — TALC
3 POWDER (GRAM) TOPICAL
Status: DISCONTINUED | OUTPATIENT
Start: 2024-02-19 | End: 2024-02-21 | Stop reason: HOSPADM

## 2024-02-19 RX ORDER — FUROSEMIDE 10 MG/ML
40 INJECTION INTRAMUSCULAR; INTRAVENOUS
Status: DISCONTINUED | OUTPATIENT
Start: 2024-02-19 | End: 2024-02-20

## 2024-02-19 RX ADMIN — ENOXAPARIN SODIUM 40 MG: 40 INJECTION SUBCUTANEOUS at 09:18

## 2024-02-19 RX ADMIN — FUROSEMIDE 40 MG: 10 INJECTION, SOLUTION INTRAMUSCULAR; INTRAVENOUS at 09:18

## 2024-02-19 RX ADMIN — EMPAGLIFLOZIN 10 MG: 10 TABLET, FILM COATED ORAL at 16:34

## 2024-02-19 RX ADMIN — FUROSEMIDE 40 MG: 10 INJECTION, SOLUTION INTRAMUSCULAR; INTRAVENOUS at 15:53

## 2024-02-19 RX ADMIN — CLOPIDOGREL 75 MG: 75 TABLET ORAL at 09:18

## 2024-02-19 RX ADMIN — SACUBITRIL AND VALSARTAN 2 TABLET: 49; 51 TABLET, FILM COATED ORAL at 09:18

## 2024-02-19 RX ADMIN — SACUBITRIL AND VALSARTAN 2 TABLET: 49; 51 TABLET, FILM COATED ORAL at 20:46

## 2024-02-19 RX ADMIN — ASPIRIN 81 MG: 81 TABLET, COATED ORAL at 09:18

## 2024-02-19 RX ADMIN — PANTOPRAZOLE SODIUM 40 MG: 40 TABLET, DELAYED RELEASE ORAL at 06:56

## 2024-02-19 RX ADMIN — CARVEDILOL 6.25 MG: 6.25 TABLET, FILM COATED ORAL at 09:18

## 2024-02-19 RX ADMIN — CARVEDILOL 6.25 MG: 6.25 TABLET, FILM COATED ORAL at 20:46

## 2024-02-19 RX ADMIN — ROSUVASTATIN CALCIUM 40 MG: 20 TABLET, FILM COATED ORAL at 09:18

## 2024-02-19 RX ADMIN — IPRATROPIUM BROMIDE AND ALBUTEROL SULFATE 3 ML: 2.5; .5 SOLUTION RESPIRATORY (INHALATION) at 07:42

## 2024-02-19 RX ADMIN — SPIRONOLACTONE 12.5 MG: 25 TABLET ORAL at 16:34

## 2024-02-19 RX ADMIN — IPRATROPIUM BROMIDE AND ALBUTEROL SULFATE 3 ML: 2.5; .5 SOLUTION RESPIRATORY (INHALATION) at 21:00

## 2024-02-19 RX ADMIN — IPRATROPIUM BROMIDE AND ALBUTEROL SULFATE 3 ML: 2.5; .5 SOLUTION RESPIRATORY (INHALATION) at 13:49

## 2024-02-19 SDOH — SOCIAL STABILITY: SOCIAL NETWORK: ARE YOU MARRIED, WIDOWED, DIVORCED, SEPARATED, NEVER MARRIED, OR LIVING WITH A PARTNER?: MARRIED

## 2024-02-19 SDOH — SOCIAL STABILITY: SOCIAL NETWORK
IN A TYPICAL WEEK, HOW MANY TIMES DO YOU TALK ON THE PHONE WITH FAMILY, FRIENDS, OR NEIGHBORS?: MORE THAN THREE TIMES A WEEK

## 2024-02-19 SDOH — SOCIAL STABILITY: SOCIAL INSECURITY: ABUSE: ADULT

## 2024-02-19 SDOH — SOCIAL STABILITY: SOCIAL INSECURITY
WITHIN THE LAST YEAR, HAVE YOU BEEN KICKED, HIT, SLAPPED, OR OTHERWISE PHYSICALLY HURT BY YOUR PARTNER OR EX-PARTNER?: NO

## 2024-02-19 SDOH — SOCIAL STABILITY: SOCIAL INSECURITY
WITHIN THE LAST YEAR, HAVE TO BEEN RAPED OR FORCED TO HAVE ANY KIND OF SEXUAL ACTIVITY BY YOUR PARTNER OR EX-PARTNER?: NO

## 2024-02-19 SDOH — SOCIAL STABILITY: SOCIAL INSECURITY: ARE THERE ANY APPARENT SIGNS OF INJURIES/BEHAVIORS THAT COULD BE RELATED TO ABUSE/NEGLECT?: NO

## 2024-02-19 SDOH — ECONOMIC STABILITY: FOOD INSECURITY: WITHIN THE PAST 12 MONTHS, YOU WORRIED THAT YOUR FOOD WOULD RUN OUT BEFORE YOU GOT MONEY TO BUY MORE.: NEVER TRUE

## 2024-02-19 SDOH — SOCIAL STABILITY: SOCIAL INSECURITY: DOES ANYONE TRY TO KEEP YOU FROM HAVING/CONTACTING OTHER FRIENDS OR DOING THINGS OUTSIDE YOUR HOME?: NO

## 2024-02-19 SDOH — SOCIAL STABILITY: SOCIAL NETWORK
DO YOU BELONG TO ANY CLUBS OR ORGANIZATIONS SUCH AS CHURCH GROUPS UNIONS, FRATERNAL OR ATHLETIC GROUPS, OR SCHOOL GROUPS?: YES

## 2024-02-19 SDOH — SOCIAL STABILITY: SOCIAL INSECURITY: DO YOU FEEL ANYONE HAS EXPLOITED OR TAKEN ADVANTAGE OF YOU FINANCIALLY OR OF YOUR PERSONAL PROPERTY?: NO

## 2024-02-19 SDOH — SOCIAL STABILITY: SOCIAL NETWORK: HOW OFTEN DO YOU ATTEND CHURCH OR RELIGIOUS SERVICES?: NEVER

## 2024-02-19 SDOH — SOCIAL STABILITY: SOCIAL INSECURITY: ARE YOU OR HAVE YOU BEEN THREATENED OR ABUSED PHYSICALLY, EMOTIONALLY, OR SEXUALLY BY ANYONE?: NO

## 2024-02-19 SDOH — SOCIAL STABILITY: SOCIAL INSECURITY: HAVE YOU HAD THOUGHTS OF HARMING ANYONE ELSE?: NO

## 2024-02-19 SDOH — HEALTH STABILITY: MENTAL HEALTH
STRESS IS WHEN SOMEONE FEELS TENSE, NERVOUS, ANXIOUS, OR CAN'T SLEEP AT NIGHT BECAUSE THEIR MIND IS TROUBLED. HOW STRESSED ARE YOU?: NOT AT ALL

## 2024-02-19 SDOH — ECONOMIC STABILITY: INCOME INSECURITY: IN THE PAST 12 MONTHS, HAS THE ELECTRIC, GAS, OIL, OR WATER COMPANY THREATENED TO SHUT OFF SERVICE IN YOUR HOME?: NO

## 2024-02-19 SDOH — SOCIAL STABILITY: SOCIAL INSECURITY: DO YOU FEEL UNSAFE GOING BACK TO THE PLACE WHERE YOU ARE LIVING?: NO

## 2024-02-19 SDOH — SOCIAL STABILITY: SOCIAL INSECURITY: HAS ANYONE EVER THREATENED TO HURT YOUR FAMILY OR YOUR PETS?: NO

## 2024-02-19 SDOH — SOCIAL STABILITY: SOCIAL INSECURITY: WITHIN THE LAST YEAR, HAVE YOU BEEN HUMILIATED OR EMOTIONALLY ABUSED IN OTHER WAYS BY YOUR PARTNER OR EX-PARTNER?: NO

## 2024-02-19 SDOH — SOCIAL STABILITY: SOCIAL NETWORK: HOW OFTEN DO YOU GET TOGETHER WITH FRIENDS OR RELATIVES?: ONCE A WEEK

## 2024-02-19 SDOH — SOCIAL STABILITY: SOCIAL INSECURITY: WITHIN THE LAST YEAR, HAVE YOU BEEN AFRAID OF YOUR PARTNER OR EX-PARTNER?: NO

## 2024-02-19 SDOH — HEALTH STABILITY: PHYSICAL HEALTH: ON AVERAGE, HOW MANY DAYS PER WEEK DO YOU ENGAGE IN MODERATE TO STRENUOUS EXERCISE (LIKE A BRISK WALK)?: 7 DAYS

## 2024-02-19 SDOH — HEALTH STABILITY: PHYSICAL HEALTH: ON AVERAGE, HOW MANY MINUTES DO YOU ENGAGE IN EXERCISE AT THIS LEVEL?: 30 MIN

## 2024-02-19 SDOH — ECONOMIC STABILITY: FOOD INSECURITY: WITHIN THE PAST 12 MONTHS, THE FOOD YOU BOUGHT JUST DIDN'T LAST AND YOU DIDN'T HAVE MONEY TO GET MORE.: NEVER TRUE

## 2024-02-19 SDOH — SOCIAL STABILITY: SOCIAL NETWORK: HOW OFTEN DO YOU ATTENT MEETINGS OF THE CLUB OR ORGANIZATION YOU BELONG TO?: 1 TO 4 TIMES PER YEAR

## 2024-02-19 ASSESSMENT — PAIN - FUNCTIONAL ASSESSMENT
PAIN_FUNCTIONAL_ASSESSMENT: 0-10
PAIN_FUNCTIONAL_ASSESSMENT: 0-10

## 2024-02-19 ASSESSMENT — ACTIVITIES OF DAILY LIVING (ADL)
JUDGMENT_ADEQUATE_SAFELY_COMPLETE_DAILY_ACTIVITIES: YES
PATIENT'S MEMORY ADEQUATE TO SAFELY COMPLETE DAILY ACTIVITIES?: YES
TOILETING: NEEDS ASSISTANCE
GROOMING: INDEPENDENT
DRESSING YOURSELF: NEEDS ASSISTANCE
ADEQUATE_TO_COMPLETE_ADL: YES
FEEDING YOURSELF: INDEPENDENT
LACK_OF_TRANSPORTATION: NO
HEARING - RIGHT EAR: HEARING AID
BATHING: NEEDS ASSISTANCE
HEARING - LEFT EAR: HEARING AID
WALKS IN HOME: INDEPENDENT

## 2024-02-19 ASSESSMENT — COGNITIVE AND FUNCTIONAL STATUS - GENERAL
DRESSING REGULAR UPPER BODY CLOTHING: A LITTLE
MOBILITY SCORE: 24
DRESSING REGULAR LOWER BODY CLOTHING: A LITTLE
MOBILITY SCORE: 24
DRESSING REGULAR UPPER BODY CLOTHING: A LITTLE
DRESSING REGULAR LOWER BODY CLOTHING: A LITTLE
DAILY ACTIVITIY SCORE: 21
TOILETING: A LITTLE
TOILETING: A LITTLE
DAILY ACTIVITIY SCORE: 21
PATIENT BASELINE BEDBOUND: NO

## 2024-02-19 ASSESSMENT — LIFESTYLE VARIABLES
SKIP TO QUESTIONS 9-10: 1
HOW MANY STANDARD DRINKS CONTAINING ALCOHOL DO YOU HAVE ON A TYPICAL DAY: 1 OR 2
HOW OFTEN DO YOU HAVE 6 OR MORE DRINKS ON ONE OCCASION: NEVER
HOW OFTEN DO YOU HAVE A DRINK CONTAINING ALCOHOL: 2-4 TIMES A MONTH
PRESCIPTION_ABUSE_PAST_12_MONTHS: NO
AUDIT-C TOTAL SCORE: 2
AUDIT-C TOTAL SCORE: 2
SUBSTANCE_ABUSE_PAST_12_MONTHS: NO

## 2024-02-19 ASSESSMENT — PATIENT HEALTH QUESTIONNAIRE - PHQ9
SUM OF ALL RESPONSES TO PHQ9 QUESTIONS 1 & 2: 0
2. FEELING DOWN, DEPRESSED OR HOPELESS: NOT AT ALL
1. LITTLE INTEREST OR PLEASURE IN DOING THINGS: NOT AT ALL

## 2024-02-19 ASSESSMENT — PAIN SCALES - GENERAL
PAINLEVEL_OUTOF10: 0 - NO PAIN
PAINLEVEL_OUTOF10: 0 - NO PAIN

## 2024-02-19 ASSESSMENT — ENCOUNTER SYMPTOMS: SHORTNESS OF BREATH: 1

## 2024-02-19 NOTE — CONSULTS
"Inpatient consult to Cardiology  Consult performed by: Charli Duarte MD  Consult ordered by: Gisell Flores MD  Reason for consult: HF        History Of Present Illness:        73 year old  male with a PMH of ICM/CAD (inferior MI s/p 3 HELEN to RCA that was c/b guidewire dissection requiring 4th HELEN, developing a VSD s/p CABG x 1 (SVG-LAD) and VSD repair in 2015) and SQ ICD placement in 2018, hypertension, hyperlipidemia, COPD, CKD. Cardiology consulted for ADHF.     Patient presented to ED c/o increased SOB.  He states he has been in [place] in the last 6 weeks and started to note his breathing to get worse gradually. Has been using his rescue inhaler without improvement of his symptoms.  Endorses chronic cough unchanged, no increased/purulent sputum.  He spent the last 3 days traveling back from [place] and noted his breathing getting significantly worse. Does sleep on multiple pillows \"cause my breathing feels better\", denies PND. No CP, palpations or LOC.     Workup with unremarkable CBC, BMP Cr 1.99 (baseline 1.3-1.6). Ddimer  > 22, 000.  BNP > 4700.  HS JIMMY 26 and 23.   EKG NSR. Non specific ST changes   CXR c.w pulm edema   CT chest PE negative for PE.  Viral swabs negative.      He was treated with lasix 80 mg IV x1, duonebs, and solumedrol 125 mg IV while in the ER for acute CHF exacerbation and COPD and admitted for further management. Currently on Lasix 40mg IV BID          Prior cardiac data:     TTE 10/25/23:   1. Left ventricular systolic function is severely decreased with a 25-30% estimated ejection fraction.   2. There is global hypokinesis of the left ventricle with minor regional variations.   3. The inferior and infero lateral wall are the most hypokinetic. 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 There is a 1.4 x 1.4 cm echodensity in the submitral apparatus (clip 110). DDx includes calcified papillary muscle (favored) v less likely thrombus.   4. There is " moderate, functional mitral valve regurgitation which is eccentrically directed. The degree of mitral regurgitation may be underestimated due to the eccentricity of the jet.   5. There is mildly reduced right ventricular systolic function.   6. The left atrium is severely dilated.   7. RVSP within normal limits.   8. Compared with study from 9/10/2022, there is now at least moderate functional mitral regurgitation, which may be underestimated due to the eccentricity of the jet. A bright echodensity in the submitral apparatus is seen in some views more easily on the current study, though it also appears in certain clips on the prior study (clip 39) and favored to be a calcified papillary muscle. Comparison of the size of the LV aneurysm is challenging due to technical limitations.      Heart catheterization 9/2022 showed moderate LM disease similar to prior angiography, patent RCA stents to level of RPDA, subintimal stent in RPL occluded and patent SVG-LAD   any previous visit from the past 1095 days.      Past Medical History:  He has a past medical history of AICD (automatic cardioverter/defibrillator) present (04/06/2023), Atherosclerotic heart disease of native coronary artery without angina pectoris (10/28/2015), CHF (congestive heart failure) (CMS/[facility]), Chronic kidney disease, stage 3 unspecified (CMS/[facility]) (12/19/2019), Community acquired pneumonia (04/06/2023), Cortical age-related cataract, left eye (03/31/2016), Dysphagia, unspecified (08/20/2015), Dysuria (04/06/2023), Emphysema/COPD (CMS/[facility]) (04/06/2023), Essential (primary) hypertension (02/26/2018), Gastric ulcer (04/06/2023), Gastritis (04/06/2023), Hemoptysis (05/17/2016), HFrEF (heart failure with reduced ejection fraction) (CMS/[facility]) (04/06/2023), Kidney stone on left side (04/06/2023), Melena (04/06/2023), NUD (nonulcer dyspepsia) (04/06/2023), Personal history of peptic ulcer disease (05/23/2019), Personal history of pneumonia (recurrent)  (03/07/2016), Prurigo nodularis (04/06/2023), and Urinary retention (04/06/2023).    Past Surgical History:  He has a past surgical history that includes Coronary artery bypass graft (02/26/2018) and Cataract extraction (Bilateral, 03/26/2016).      Social History:  He reports that he quit smoking about 26 years ago. His smoking use included cigarettes. He has never used smokeless tobacco. He reports that he does not currently use alcohol. He reports that he does not currently use drugs.    Family History:  Family History   Problem Relation Name Age of Onset    Coronary artery disease Mother      Hypertension Father          Allergies:  Penicillins and Sulfa (sulfonamide antibiotics)    Inpatient Medications:  Scheduled medications   Medication Dose Route Frequency    aspirin  81 mg oral Daily    carvedilol  6.25 mg oral BID    clopidogrel  75 mg oral Daily    docusate sodium  100 mg oral BID    enoxaparin  40 mg subcutaneous q24h    furosemide  40 mg intravenous 2 times per day    furosemide  80 mg intravenous Once    ipratropium-albuteroL  3 mL nebulization TID    melatonin  3 mg oral Daily    pantoprazole  40 mg oral Daily before breakfast    Or    pantoprazole  40 mg intravenous Daily before breakfast    rosuvastatin  40 mg oral Daily    sacubitriL-valsartan  2 tablet oral BID    tamsulosin  0.4 mg oral Daily    tiotropium  2 Inhalation inhalation Daily     PRN medications   Medication    acetaminophen    Or    acetaminophen    Or    acetaminophen    acetaminophen    albuterol    guaiFENesin    ipratropium-albuteroL    ondansetron    Or    ondansetron    polyethylene glycol     Continuous Medications   Medication Dose Last Rate     Outpatient Medications:  Current Outpatient Medications   Medication Instructions    albuterol (ProAir HFA) 90 mcg/actuation inhaler 2 puffs, inhalation, Every 4 hours PRN    aspirin 81 mg EC tablet 1 tablet, oral, Daily    carvedilol (COREG) 6.25 mg, oral, 2 times daily     clopidogrel (PLAVIX) 75 mg, oral, Daily    diphenhydrAMINE (SOMINEX) 25 mg, oral, Nightly PRN    Entresto  mg tablet 1 tablet, oral, 2 times daily    multivitamin (MULTIPLE VITAMINS ORAL) 1 tablet, oral, Daily    rosuvastatin (Crestor) 40 mg tablet 1 tablet, oral, Daily    tamsulosin (FLOMAX) 0.4 mg, oral, Daily       Physical Exam:    Constitutional: awake, alert, no distress  Skin: warm and dry, no rashes  Eyes: nonicteric  Head/Neck: supple, no thyromegaly, JVP elevated, Carotid pulse palpable and without bruit, no lymphadenopathy  Resp: bilateral basal crackles   CV: Regular rhythm, normal S1 and S2 without gallop, rub or murmur  GI: abdomen is soft, nontender, no organomegaly  Ext: +1 edema, radial and pedal pulses 2+  Neuro: nonfocal   Psych: mood and behavior appropriate.          Last Recorded Vitals:  Vitals:    02/19/24 0933 02/19/24 0935 02/19/24 1245 02/19/24 1349   BP: 121/61 119/60 131/68    BP Location: Right arm Right arm Right arm    Patient Position: Sitting Standing Lying    Pulse: 72 74 54    Resp:       Temp:   36.6 °C (97.9 °F)    TempSrc:   Temporal    SpO2:   100% 96%   Weight:       Height:         Last I/O:  I/O last 3 completed shifts:  In: 50 (0.7 mL/kg) [I.V.:50 (0.7 mL/kg)]  Out: - (0 mL/kg)   Weight: 73 kg       Last Labs:  CBC - 2/19/2024:  8:01 AM  3.7 10.0 167    30.6      CMP - 2/19/2024:  8:01 AM  8.4 6.8 14 --- 0.7   _ 3.7 11 45      PTT - No results in last year.  1.2   13.6 _       Assessment/Plan         73 year old  male with a PMH of ICM/CAD (inferior MI s/p 3 HELEN to RCA that was c/b guidewire dissection requiring 4th HELEN, developing a VSD s/p CABG x 1 (SVG-LAD) and VSD repair in 2015) and SQ ICD placement in 2018, hypertension, hyperlipidemia, COPD, CKD III. Cardiology consulted for ADHF.       #ADHF  #ICM/HFrEF (EF 25-30%), ACC/AHA class C  #CAD (inferior MI s/p 3 HELEN to RCA that was c/b guidewire dissection requiring 4th HELEN, developing a VSD s/p CABG x 1  (SVG-LAD) and VSD repair in 2015) and SQ ICD placement in 2018    -Dry weight: ~71kgs (in 11/2023, although he has SOB at that visit, so could be less than that)   -Admit weight: 73kgs   -BNP >4700  -ECG NSR, non ischemic   -CXR c/w pulm congestion   -TTE with EF of 25-30% in 10/2023   -Repeat TTE PENDING   -Diuresed with lasix 80mg IV, continue 40mg IV BID.  -Current GDMT:  *BB: Coreg 6.25mg BID   *ACE/ARB/ARNI: Entresto 49/51mg bid   *MRA: START Spironolactone 12.5mg daily  *SGLT2-inhibitor: START Empagloflizin 10mg daily   -Strict I/O, regular diet to facilitate accurate home diuretic dose need        Thank you for involving us in this patient care. Recommendations not final until signed by attending.           Charli Duarte MD  Cardiology Fellow PGY-5    ===========================================================  Attending Note   ===========================================================  Both the Fellow and I have had a face to face encounter with the patient today. I have examined the patient and edited the documented physical examination as necessary.  I personally reviewed the patient's recent labs, medications, orders, EKGs, and pertinent cardiac imaging.  I have reviewed the LAMIN's encounter note, approve the LAMIN's documentation and have edited the note to reflect the diagnostic and therapeutic plan.    73 year old  male with a PMH of ICM/CAD (inferior MI s/p 3 HELEN to RCA that was c/b guidewire dissection requiring 4th HELEN, developing a VSD s/p CABG x 1 (SVG-LAD) and VSD repair in 2015) and SQ ICD placement in 2018, hypertension, hyperlipidemia, COPD, CKD. Cardiology consulted for ADHF.      Patient presented to ED c/o increased SOB.  He states he has been in [place] in the last 6 weeks and started to note his breathing to get worse gradually. Has been using his rescue inhaler without improvement of his symptoms.  Endorses chronic cough unchanged, no increased/purulent sputum.  He spent the  "last 3 days traveling back from [place] and noted his breathing getting significantly worse. Does sleep on multiple pillows \"cause my breathing feels better\", denies PND. No CP, palpations or LOC.      Workup with unremarkable CBC, BMP Cr 1.99 (baseline 1.3-1.6). Ddimer  > 22, 000.  BNP > 4700.  HS JIMMY 26 and 23.   EKG NSR. Non specific ST changes   CXR c.w pulm edema   CT chest PE negative for PE.  Viral swabs negative.       He was treated with lasix 80 mg IV x1, duonebs, and solumedrol 125 mg IV while in the ER for acute CHF exacerbation and COPD and admitted for further management. Currently on Lasix 40mg IV BID      No exacerbating or relieving factors.  Patient denies chest pain and angina.  Pt denies shortness of breath, dyspnea on exertion, orthopnea, and paroxysmal nocturnal dyspnea.  Pt denies worsening lower extremity edema.  Pt denies palpitations or syncope.  No recent falls.  No fever or chills.  No cough.  No change in bowel or bladder habits.  No sick contacts.  No recent travel.     12 point review of systems including (Constitutional, Eyes, ENMT, Respiratory, Cardiac, Gastrointestinal, Neurological, Psychiatric, and Hematologic) was performed and is otherwise negative.    Past medical history:  As above.    Medications were reviewed.    Allergies were reviewed.    Social history:  Patient denies smoking, alcohol abuse, or illicit drug use.    Family history:  No sudden cardiac death or premature coronary artery disease.     General:  Patient is awake, alert, and oriented.  Patient is in no acute distress. He is on supplemental Oxygen ( Not normal)   HEENT:  Pupils equal and reactive.  Normocephalic.  Moist mucosa.    Neck:  No thyromegaly.  Elevated Jugular Venous Pressure.  Cardiovascular:  Regular rate and rhythm.  Normal S1 and S2.  Pulmonary:  Clear to auscultation bilaterally.  Abdomen:  Soft. Non-tender.   Non-distended.  Positive bowel sounds.  Lower Extremities:  2+ pedal pulses. 1-2+ "   Neurologic:  Cranial nerves intact.  No focal deficit.   Skin: Skin warm and dry, normal skin turgor.   Psychiatric: Normal affect.    Vital signs, telemetry, medications, labs, and imaging were reviewed as well.    73 year old  male with a PMH of ICM/CAD (inferior MI s/p 3 HELEN to RCA that was c/b guidewire dissection requiring 4th HELEN, developing a VSD s/p CABG x 1 (SVG-LAD) and VSD repair in 2015) and SQ ICD placement in 2018, hypertension, hyperlipidemia, COPD, CKD III. Cardiology consulted for ADHF.       #ADHF  #ICM/HFrEF (EF 25-30%), ACC/AHA class C  #CAD (inferior MI s/p 3 HELEN to RCA that was c/b guidewire dissection requiring 4th HELEN, developing a VSD s/p CABG x 1 (SVG-LAD) and VSD repair in 2015) and SQ ICD placement in 2018    -Dry weight: ~71kgs (in 11/2023, although he has SOB at that visit, so could be less than that)   -Admit weight: 73kgs   -BNP >4700  -ECG NSR, non ischemic   -CXR c/w pulm congestion   -TTE with EF of 25-30% in 10/2023   -Repeat TTE PENDING   -Diuresed with lasix 80mg IV, continue 40mg IV BID.  -Current GDMT:  *BB: Coreg 6.25mg BID   *ACE/ARB/ARNI: Entresto 49/51mg bid   *MRA: START Spironolactone 12.5mg daily  *SGLT2-inhibitor: START Empagloflizin 10mg daily   -Strict I/O, regular diet to facilitate accurate home diuretic dose need    Reviewed and approved by PADILLA VENTURA on 2/19/24 at 7:13 PM.

## 2024-02-19 NOTE — NURSING NOTE
Met with patient at bedside.  EF 25%-30% via Echo.  Patient complains of SOB and Fatigue during exercise.  Walks his dog a few times a day.  NYHA Class III.   Discussed CHF, signs and symptoms, and when to call cardiologist. Reinforced the importance of following a Low Sodium Diet and monitoring daily weight, lower leg edema, and shortness of breath. Reviewed importance of taking prescribed medications after discharge.   HEART FAILURE EDUCATION:  1. Weigh yourself daily and record on your weight log.  2. If you gain more than 2 or 3 pounds overnight, call your cardiologist.  3. Follow a low sodium diet. No more than 2000 mg in one day, or more than 650 mg per meal.  4. Limit total fluids to no more than 8 cups (or 2 liters) per day - this includes all fluids (water, coffee, juice, milk, tea, etc.)  5. Monitor your blood pressure daily and record on your weight log.  6. Call to schedule your follow-up appointments when you get home if they were not already scheduled for you.  7. Keep your follow-up appointments! Bring your weight log with you so the doctors can see your weight trend and blood pressure readings.  8. Be sure to  any new prescriptions and take them as directed. If unsure of the medications, be sure to call your cardiologist.  9. Stay as active as you can tolerate.   10. If you notice subtle change of symptoms (slight increase in swelling, slight shortness of breath, a new intolerance to laying flat, a new cough), be sure to call your cardiologist.   You have been referred to the [facility].This program is completely free and does not take the place of regularly scheduled doctor visits If you don't get a call from a nurse w/in 24 hours after discharge,please call [phone].   You have been referred to [facility].  It is located in the [facility] at [address].  If you need to cancel or reschedule, please call [phone].  Thank  you

## 2024-02-19 NOTE — SIGNIFICANT EVENT
Brief HPI:  Wojciech Kern is a 73 y.o. male who presented to the ER with c/o increased SOB.  Has been using his rescue inhaler without improvement of his symptoms.  Endorses chronic cough unchanged, no increased/purulent sputum.  He spent the last 3 days traveling back from [place].  Denies history of DVT/PE.  Ddimer  > 22, 000.  BNP > 4700.  HS JIMMY 26 and 23. EKG NSR.  Rate 71 bpm.  Non-specific T wave changes, similar to prior EKGs.   CT chest PE negative for PE but did show CHF.  Viral swabs negative.  He was treated with lasix 80 mg IV x1, duonebs, and solumedrol 125 mg IV while in the ER for acute CHF exacerbation and COPD and admitted for further management.        Past Medical History:   Diagnosis Date    AICD (automatic cardioverter/defibrillator) present 04/06/2023    Atherosclerotic heart disease of native coronary artery without angina pectoris 10/28/2015    Chronic kidney disease, stage 3 unspecified (CMS/[facility]) 12/19/2019    Community acquired pneumonia 04/06/2023    Cortical age-related cataract, left eye 03/31/2016    Dysphagia, unspecified 08/20/2015    Dysuria 04/06/2023    Emphysema/COPD (CMS/[facility]) 04/06/2023    Essential (primary) hypertension 02/26/2018    Gastric ulcer 04/06/2023    -H pylori     Gastritis 04/06/2023    Hemoptysis 05/17/2016    HFrEF (heart failure with reduced ejection fraction) (CMS/[facility]) 04/06/2023    Kidney stone on left side 04/06/2023    Melena 04/06/2023    NUD (nonulcer dyspepsia) 04/06/2023    Personal history of peptic ulcer disease 05/23/2019    Personal history of pneumonia (recurrent) 03/07/2016    Prurigo nodularis 04/06/2023    Urinary retention 04/06/2023       Reviewed:  Most recent labs  Most recent imaging  Current medications  Vital Flow sheets  ER documentation    Results for orders placed or performed during the hospital encounter of 02/18/24 (from the past 24 hour(s))   CBC and Auto Differential   Result Value Ref Range    WBC 7.3 4.4 - 11.3 x10*3/uL    nRBC  0.0 0.0 - 0.0 /100 WBCs    RBC 3.79 (L) 4.50 - 5.90 x10*6/uL    Hemoglobin 11.8 (L) 13.5 - 17.5 g/dL    Hematocrit 36.6 (L) 41.0 - 52.0 %    MCV 97 80 - 100 fL    MCH 31.1 26.0 - 34.0 pg    MCHC 32.2 32.0 - 36.0 g/dL    RDW 14.5 11.5 - 14.5 %    Platelets 224 150 - 450 x10*3/uL    Neutrophils % 72.0 40.0 - 80.0 %    Immature Granulocytes %, Automated 0.4 0.0 - 0.9 %    Lymphocytes % 18.8 13.0 - 44.0 %    Monocytes % 7.0 2.0 - 10.0 %    Eosinophils % 1.1 0.0 - 6.0 %    Basophils % 0.7 0.0 - 2.0 %    Neutrophils Absolute 5.25 1.60 - 5.50 x10*3/uL    Immature Granulocytes Absolute, Automated 0.03 0.00 - 0.50 x10*3/uL    Lymphocytes Absolute 1.37 0.80 - 3.00 x10*3/uL    Monocytes Absolute 0.51 0.05 - 0.80 x10*3/uL    Eosinophils Absolute 0.08 0.00 - 0.40 x10*3/uL    Basophils Absolute 0.05 0.00 - 0.10 x10*3/uL   Comprehensive Metabolic Panel   Result Value Ref Range    Glucose 98 74 - 99 mg/dL    Sodium 138 136 - 145 mmol/L    Potassium 4.0 3.5 - 5.3 mmol/L    Chloride 103 98 - 107 mmol/L    Bicarbonate 26 21 - 32 mmol/L    Anion Gap 13 10 - 20 mmol/L    Urea Nitrogen 32 (H) 6 - 23 mg/dL    Creatinine 1.99 (H) 0.50 - 1.30 mg/dL    eGFR 34 (L) >60 mL/min/1.73m*2    Calcium 8.8 8.6 - 10.3 mg/dL    Albumin 4.4 3.4 - 5.0 g/dL    Alkaline Phosphatase 57 33 - 136 U/L    Total Protein 8.1 6.4 - 8.2 g/dL    AST 19 9 - 39 U/L    Bilirubin, Total 0.9 0.0 - 1.2 mg/dL    ALT 15 10 - 52 U/L   D-Dimer, VTE Exclusion   Result Value Ref Range    D-Dimer, Quantitative VTE Exclusion 22,606 (H) <=500 ng/mL FEU   B-Type Natriuretic Peptide   Result Value Ref Range    BNP >4,700 (H) 0 - 99 pg/mL   Sars-CoV-2 PCR   Result Value Ref Range    Coronavirus 2019, PCR Not Detected Not Detected   Influenza A, and B PCR   Result Value Ref Range    Flu A Result Not Detected Not Detected    Flu B Result Not Detected Not Detected   RSV PCR   Result Value Ref Range    RSV PCR Not Detected Not Detected   Troponin I, High Sensitivity, Initial   Result  "Value Ref Range    Troponin I, High Sensitivity 26 (H) 0 - 20 ng/L   Troponin, High Sensitivity, 1 Hour   Result Value Ref Range    Troponin I, High Sensitivity 23 (H) 0 - 20 ng/L     CT angio chest for pulmonary embolism   Final Result   1.  No evidence of pulmonary emboli. There is similar appearance of   small right pleural effusion. Interval development of diffuse   interseptal thickening suggestive of pulmonary venous congestion.   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 Cardiology follow-up is recommended.   Enlarged main pulmonary trunk suggestive of pulmonary arterial   hypertension.   2. Moderate centrilobular emphysema             Signed by: Colby Gee 2/19/2024 12:24 AM   Dictation workstation:   [id number]      XR chest 1 view   Final Result   1.  Features suggesting pulmonary edema with cardiac enlargement.        Follow-up is advised        MACRO:   None        Signed by: Wojciech Ontiveros 2/18/2024 6:57 PM   Dictation workstation:   [id number]           BP (!) 158/96   Pulse 84   Temp 36.8 °C (98.2 °F)   Resp 17   Ht 1.753 m (5' 9\")   Wt 74.8 kg (165 lb)   SpO2 97%   BMI 24.37 kg/m²     A/P:  Principal Problem:    Shortness of breath  Active Problems:    AICD (automatic cardioverter/defibrillator) present    Benign essential hypertension    CAD (coronary artery disease)    Emphysema/COPD (CMS/H[facility])    HFrEF (heart failure with reduced ejection fraction) (CMS/[facility])    Stage 3a chronic kidney disease (CMS/[facility])  -CBC: no leukocytosis, no acute anemia, no thrombocytopenia  -CMP: no acute electrolyte abnormalities, BUN 32, creatinine 1.99 which is slightly higher than baseline CKD,  no acute liver dysfunction appreciated   -Ddimer  > 22, 000.  B-BNP > 4700.    -HS JIMMY 26 and 23.   -EKG NSR.  Rate 71 bpm.  Non-specific T wave changes, similar to prior EKGs.     -CT chest PE " negative for PE but did show CHF.     -Viral swabs negative.  -received lasix 80 mg IV x1 in the ER  -ECHO 11/7/23: LVSF severely decreased.  EF 25-30%.  Global hypokinesis of the LV with minor regional wall variations.  Moderate Mitral valve regurg.  -received solumedrol 125 mg IV x 1 in the ER --> defer additional steroids to primary  -repeat labs in AM   -I/O  -will start lasix 40 mg IV BID (8a/2p) for now  -consider cardiology consult   -GI ppx: Protonix, bowel regimen  -VTE ppx: Lovenox.  -basic admission orders as a courtesy to attending physician.   -Pt was not examined by this provider.   -Primary attending to follow with full H&P, medication reconciliation, and additional orders/consults as appropriate    Total time spent obtaining and reviewing patient medical record, labs, vitals, and recent documentation without direct patient care: 35 minutes    Belinda Darby PA-C

## 2024-02-19 NOTE — PROGRESS NOTES
Pharmacy Medication History Review    Wojciech Kern is a 73 y.o. male admitted for Shortness of breath. Pharmacy reviewed the patient's [id number] medications and allergies for accuracy.    The list below reflectives the updated PTA list. Please review each medication in order reconciliation for additional clarification and justification.  Medications Prior to Admission   Medication Sig Dispense Refill Last Dose    albuterol (ProAir HFA) 90 mcg/actuation inhaler Inhale 2 puffs every 4 hours if needed for wheezing or shortness of breath. 8.5 g 0 2/18/2024 at 0900    aspirin 81 mg EC tablet Take 1 tablet (81 mg) by mouth once daily.   2/18/2024 at 0900    carvedilol (Coreg) 6.25 mg tablet Take 1 tablet (6.25 mg) by mouth 2 times a day. 180 tablet 0 2/18/2024 at 0900    clopidogrel (Plavix) 75 mg tablet Take 1 tablet (75 mg) by mouth once daily.   2/18/2024 at 0900    diphenhydrAMINE (Sominex) 25 mg tablet Take 1 tablet (25 mg) by mouth as needed at bedtime for sleep.   2/18/2024 at 2200    Entresto  mg tablet Take 1 tablet by mouth 2 times a day. 60 tablet 0 2/18/2024 at 0900    multivitamin (MULTIPLE VITAMINS ORAL) Take 1 tablet by mouth once daily.   2/18/2024 at 0900    rosuvastatin (Crestor) 40 mg tablet Take 1 tablet (40 mg) by mouth once daily.   2/18/2024 at 0900    tamsulosin (Flomax) 0.4 mg 24 hr capsule Take 1 capsule (0.4 mg) by mouth once daily. 90 capsule 1 2/18/2024 at 0900       Spoke to the patient, patient takes ASA and Plavix. No changes  The list below reflectives the updated allergy list. Please review each documented allergy for additional clarification and justification.  Allergies  Reviewed by Idris Carrillo RN on 2/19/2024        Severity Reactions Comments    Penicillins High Anaphylaxis, Hives     Sulfa (sulfonamide Antibiotics) Not Specified Hives             Below are additional concerns with the patient's PTA list.      Sridevi Plascencia CPhT

## 2024-02-19 NOTE — PROGRESS NOTES
Wojciech Kern is a 73 y.o. male who presented to ed on 2/18/2024 presenting with Shortness of breath.    Subjective    Sitting on couch with wife. No complaints at this time. Reports breathing has improved and this is why he came to hospital. No edema now.     Review of systems   Negative except as noted above        Objective   Physical Exam   Physical Exam  Constitutional:       General: He is not in acute distress.     Appearance: He is ill-appearing.   HENT:      Head: Normocephalic and atraumatic.   Eyes:      Extraocular Movements: Extraocular movements intact.      Conjunctiva/sclera: Conjunctivae normal.      Pupils: Pupils are equal, round, and reactive to light.   Cardiovascular:      Rate and Rhythm: Normal rate and regular rhythm.      Pulses: Normal pulses.      Heart sounds: Normal heart sounds.   Pulmonary:      Effort: Pulmonary effort is normal.      Breath sounds: No wheezing or rhonchi.      Comments: Nc 2l in place. Diminished bl bases. No crackles   Chest:      Chest wall: No tenderness.   Abdominal:      General: Abdomen is flat. Bowel sounds are normal. There is no distension.      Palpations: Abdomen is soft. There is no mass.      Tenderness: There is no abdominal tenderness.   Musculoskeletal:         General: Normal range of motion.      Cervical back: Normal range of motion and neck supple.      Right lower leg: Edema present.      Left lower leg: Edema present.      Comments: Trace sockline edema bl lower legs    Skin:     General: Skin is warm and dry.      Capillary Refill: Capillary refill takes less than 2 seconds.      Coloration: Skin is pale.   Neurological:      General: No focal deficit present.      Mental Status: He is alert and oriented to person, place, and time.      Motor: No weakness.   Psychiatric:         Mood and Affect: Mood normal.         Last Recorded Vitals  /68 (BP Location: Right arm, Patient Position: Lying)   Pulse 54   Temp 36.6 °C (97.9 °F)  (Temporal)   Resp 16   Wt 73 kg (160 lb 15 oz)   SpO2 96%   Intake/Output last 3 Shifts:    Intake/Output Summary (Last 24 hours) at 2/19/2024 1603  Last data filed at 2/19/2024 1556  Gross per 24 hour   Intake 50 ml   Output 425 ml   Net -375 ml     Admission Weight  Weight: 74.8 kg (165 lb) (02/18/24 1618)  Daily Weight  02/19/24 : 73 kg (160 lb 15 oz)      Medications   Scheduled medications  aspirin, 81 mg, oral, Daily  carvedilol, 6.25 mg, oral, BID  clopidogrel, 75 mg, oral, Daily  docusate sodium, 100 mg, oral, BID  empagliflozin, 10 mg, oral, Daily  enoxaparin, 40 mg, subcutaneous, q24h  furosemide, 40 mg, intravenous, 2 times per day  furosemide, 80 mg, intravenous, Once  ipratropium-albuteroL, 3 mL, nebulization, TID  melatonin, 3 mg, oral, Daily  pantoprazole, 40 mg, oral, Daily before breakfast   Or  pantoprazole, 40 mg, intravenous, Daily before breakfast  rosuvastatin, 40 mg, oral, Daily  sacubitriL-valsartan, 2 tablet, oral, BID  spironolactone, 12.5 mg, oral, Daily  tamsulosin, 0.4 mg, oral, Daily  tiotropium, 2 Inhalation, inhalation, Daily      Continuous medications     PRN medications  PRN medications: acetaminophen **OR** acetaminophen **OR** acetaminophen, acetaminophen, albuterol, guaiFENesin, ipratropium-albuteroL, ondansetron **OR** ondansetron, polyethylene glycol     Relevant Results  Image Results  ECG 12 Lead  Normal sinus rhythm  Inferior infarct (cited on or before 18-FEB-2024)  Possible Anterior infarct , age undetermined  T wave abnormality, consider lateral ischemia  Abnormal ECG  When compared with ECG of 20-AUG-2023 22:35,  Previous ECG has undetermined rhythm, needs review  Nonspecific T wave abnormality now evident in Anterior leads  CT angio chest for pulmonary embolism  Narrative: Interpreted By:  Colby Gee,   STUDY:  CT ANGIO CHEST FOR PULMONARY EMBOLISM;  2/18/2024 11:40 pm      INDICATION:  Signs/Symptoms:sob and pain.      COMPARISON:  Chest CT dated 12/08/2022.       ACCESSION NUMBER(S):  [id number]      ORDERING CLINICIAN:  KIRK MORALES      TECHNIQUE:  Helical data acquisition of the chest was obtained contrast volume:  75 mL IV contrast Omnipaque 350. Dual energy iodine mapping of the  pulmonary arterial branches was utilized. Axial contiguous images  were reformatted in coronal and sagittal planes. Axial and coronal  MIP images were created and reviewed.      FINDINGS:  POTENTIAL LIMITATIONS OF THE STUDY: Motion and mixing artifact which  limits evaluation of the distal branch vessels.      HEART AND VESSELS:  No discrete filling defects within the main pulmonary artery or its  branches.      Main pulmonary trunk is enlarged measuring 3.5 cm in caliber  suggestive of pulmonary arterial hypertension similar to prior.      The thoracic aorta is of normal course and caliber.      Heavy coronary artery calcifications are seen.The study is not  optimized for evaluation of coronary arteries.      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 Sternotomy  changes.      No evidence of pericardial effusion.      MEDIASTINUM AND ALEXANDRO, LOWER NECK AND AXILLA:  The visualized thyroid gland is within normal limits.      No evidence of thoracic lymphadenopathy by CT criteria.      Esophagus appears within normal limits as seen.      LUNGS AND AIRWAYS:  The trachea and central airways are patent. No endobronchial lesion.      There is moderate centrilobular emphysema. There is diffuse  interseptal thickening suggestive of pulmonary venous congestion new  from prior. There is bibasilar subsegmental atelectasis. There is a  small right pleural effusion and no significant pleural effusion on  the left similar to prior.      UPPER ABDOMEN:  No acute findings. A few hepatic cysts are visualized. Proteinaceous  right upper pole exophytic renal cyst  is also visualized.      CHEST WALL AND OSSEOUS STRUCTURES:  There are no suspicious osseous lesions. The visualized osseous  structures are intact.      Impression: 1.  No evidence of pulmonary emboli. There is similar appearance of  small right pleural effusion. Interval development of diffuse  interseptal thickening suggestive of pulmonary venous congestion.  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 Cardiology follow-up is recommended.  Enlarged main pulmonary trunk suggestive of pulmonary arterial  hypertension.  2. Moderate centrilobular emphysema          Signed by: Colby Gee 2/19/2024 12:24 AM  Dictation workstation:   [id number]        Assessment/Plan           Principal Problem:    Shortness of breath  Active Problems:    AICD (automatic cardioverter/defibrillator) present    Benign essential hypertension    CAD (coronary artery disease)    Emphysema/COPD (CMS/[facility])    HFrEF (heart failure with reduced ejection fraction) (CMS/[facility])    Stage 3a chronic kidney disease (CMS/[facility])      #Acute on chronic systolic congestive heart failure  CT PE negative for clots  Recent echocardiogram reviewed  Start IV Lasix twice a day  Resume Entresto  Should be a good candidate for Farxiga/Jardiance  Will start upon discharge  - cardio consulted, appreciate recs     #Chronic bronchitis/COPD  The patient has a.m. productive cough  Started the patient on Spiriva  He is scheduled for outpatient lung function study but has not been done yet     #Coronary artery disease  Continue current medications including aspirin and statins along with Plavix     #Dyslipidemia  Stable continue statins with target LDL less than 70    Dvt prophylaxis   - lovenox  - scd/ambulate     Gi prophylaxis   - pantopraxole   - miralax     DC plan  - DC home when medically stable    Labs/Testing reviewed:   Interdisciplinary team  rounding completed with hospitalist, nurse, TCC  NP discussed plan & lab/testing results with Dr. Flores   --- pending iv diuresis. Plan for another day or 2 per attending  45 min spent on professional & overall care of this patient    Bri Moya, SHANNON-CNP

## 2024-02-19 NOTE — PROGRESS NOTES
Emergency Medicine Transition of Care Note.    I received Wojciech Kern in signout from Dr. Deng.  Please see the previous ED provider note for all HPI, PE and MDM up to the time of signout at 8 PM. This is in addition to the primary record.    In brief Wojciech Kern is an 73 y.o. male presenting for   Chief Complaint   Patient presents with    Shortness of Breath     Hx of COPD but feels like inhaler hasn't been helping since yesterday. Pt states he drove from [place] & arrived home last night.      At the time of signout we were awaiting: CT angiogram and disposition    ED Course as of 02/19/24 0054   Sun Feb 18, 2024   1751 ECG 12 Lead  EKG ordered and interpreted by ED physician demonstrates sinus rhythm, 71 bpm.  Nonspecific conduction delay, nonspecific T wave inversions no change from prior ECG.  No overt ischemic findings. [KS]   Mon Feb 19, 2024   0053 Patient CT angio shows no pulm embolism and did show CHF.  Patient was given intravenous Lasix 80 mg, and patient will be hospitalized for further care. [MT]      ED Course User Index  [KS] Joseph Deng MD MPH  [MT] Nadira Shea MD         Diagnoses as of 02/19/24 0054   Shortness of breath   Acute congestive heart failure, unspecified heart failure type (CMS/HCC)   COPD exacerbation (CMS/HCC)   Congestive heart failure, unspecified HF chronicity, unspecified heart failure type (CMS/HCC)       Medical Decision Making  73-year-old male history of COPD CHF came in with shortness of breath.  Chest ray shows congestive heart failure D-dimer elevated BNP elevated CT angio of the chest shows no PE but did show heart failure and unchanged LV aneurysm calcified.  Patient had received IV Solu-Medrol and nebulizer treatments prior to arrival I given IV Lasix 80 mg and patient will be hospitalized further care.  Discussed with Dr. Flores.    Final diagnoses:   [R06.02] Shortness of breath   [I50.9] Acute congestive heart failure, unspecified heart failure  type (CMS/[facility])   [J44.1] COPD exacerbation (CMS/[facility])   [I50.9] Congestive heart failure, unspecified HF chronicity, unspecified heart failure type (CMS/[facility])           Procedure  Procedures    Nadira Shea MD

## 2024-02-19 NOTE — CARE PLAN
The patient's goals for the shift include Sleep    The clinical goals for the shift include Normal Spo2      Problem: Fall/Injury  Goal: Not fall by end of shift  Outcome: Progressing  Goal: Be free from injury by end of the shift  Outcome: Progressing  Goal: Verbalize understanding of personal risk factors for fall in the hospital  Outcome: Progressing  Goal: Verbalize understanding of risk factor reduction measures to prevent injury from fall in the home  Outcome: Progressing  Goal: Use assistive devices by end of the shift  Outcome: Progressing  Goal: Pace activities to prevent fatigue by end of the shift  Outcome: Progressing     Problem: Respiratory  Goal: Clear secretions with interventions this shift  Outcome: Progressing  Goal: Minimize anxiety/maximize coping throughout shift  Outcome: Progressing  Goal: Minimal/no exertional discomfort or dyspnea this shift  Outcome: Progressing  Goal: No signs of respiratory distress (eg. Use of accessory muscles. Peds grunting)  Outcome: Progressing  Goal: Patent airway maintained this shift  Outcome: Progressing  Goal: Tolerate mechanical ventilation evidenced by VS/agitation level this shift  Outcome: Progressing  Goal: Tolerate pulmonary toileting this shift  Outcome: Progressing  Goal: Verbalize decreased shortness of breath this shift  Outcome: Progressing  Goal: Wean oxygen to maintain O2 saturation per order/standard this shift  Outcome: Progressing  Goal: Increase self care and/or family involvement in next 24 hours  Outcome: Progressing

## 2024-02-19 NOTE — NURSING NOTE
Patient arrived from ED at or about 0235 on 2 [address] no apparent or expressed distress. Able to answer admission questions promptly.  Resting comfortably at this time. Medication and care plan compliant. Educated on falls risk and safety. Rounding ongoing.

## 2024-02-19 NOTE — PROGRESS NOTES
02/19/24 1327   Discharge Planning   Living Arrangements Spouse/significant other   Support Systems Spouse/significant other   Assistance Needed independent   Type of Residence Private residence   Home or Post Acute Services None   Patient expects to be discharged to: home with wife, may need home o2 eval?   Does the patient need discharge transport arranged? Yes   RoundTrip coordination needed? Yes   Has discharge transport been arranged? Yes     Met with patient at bedside  Explained role of TCC  Patient admitted for CHF/COPD exacerbation    Met with chase, he lives with wife, does not use supplemental home o2, may need home o2 eval prior to dc, plans to return home once medically ready for dc, currently does not use DME but thinks he may get a walker in the near future. States he is able to transport self to appts and to obtain meds, states wife will transport him home at dc.    ADOD 1-3 days  DC home no additional needs

## 2024-02-19 NOTE — H&P
Wojciech Kern is a 73 y.o. male   Shortness of Breath       Patient with a past medical history of hypertension, hyperlipidemia, COPD, CKD, CAD/inferior MI s/p 3 HELEN to RCA developing a VSD s/p CABG , VSD repair in 2015 and cardiomyopathy s/p SQ ICD placement in 2018. Echocardiogram 11/2023 showed an EF of 25-30% with eccentric LVH, mildly reduced RV systolic function and no significant valvular disease. Heart catheterization 9/2022 showed moderate LM disease similar to prior angiography, patent RCA stents to level of RPDA, subintimal stent in RPL occluded and patent SVG-LAD separate worsening shortness of breath over the last 3 days  He recently returned from [place] and since then has been getting progressively short of breath  Denies any associated chest pain or palpitations  Had an elevated D-dimer in the emergency room but CT PE was negative for blood clots  Also with elevated BNP consistent with congestive heart failure    Past Medical History  Past Medical History:   Diagnosis Date    AICD (automatic cardioverter/defibrillator) present 04/06/2023    Atherosclerotic heart disease of native coronary artery without angina pectoris 10/28/2015    CHF (congestive heart failure) (CMS/[facility])     Chronic kidney disease, stage 3 unspecified (CMS/[facility]) 12/19/2019    Community acquired pneumonia 04/06/2023    Cortical age-related cataract, left eye 03/31/2016    Dysphagia, unspecified 08/20/2015    Dysuria 04/06/2023    Emphysema/COPD (CMS/[facility]) 04/06/2023    Essential (primary) hypertension 02/26/2018    Gastric ulcer 04/06/2023    -H pylori     Gastritis 04/06/2023    Hemoptysis 05/17/2016    HFrEF (heart failure with reduced ejection fraction) (CMS/[facility]) 04/06/2023    Kidney stone on left side 04/06/2023    Melena 04/06/2023    NUD (nonulcer dyspepsia) 04/06/2023    Personal history of peptic ulcer disease 05/23/2019    Personal history of pneumonia (recurrent) 03/07/2016    Prurigo nodularis 04/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 His smoking use included cigarettes. He has never used smokeless tobacco. He reports that he does not currently use alcohol. He reports that he does not currently use drugs.    Family History  Family History   Problem Relation Name Age of Onset    Coronary artery disease Mother      Hypertension Father          Allergies  Penicillins and Sulfa (sulfonamide antibiotics)    Review of Systems   Respiratory:  Positive for shortness of breath.         Constitutional: not feeling poorly, no fever, no recent weight gain and no recent weight loss.   Eyes: no blurred vision and no diplopia.   ENT: no hearing loss, no tinnitus, no earache, no sore throat, no hoarseness and no swollen glands in the neck.   Cardiovascular: no chest pain, no tightness or heavy pressure,no palpitations and no lower extremity edema.   Respiratory: Shortness of breath  Gastrointestinal: no change in bowel habits, no diarrhea, no constipation, no bloody stools, no nausea, no vomiting, no abdominal pain, no signs and symptoms of ulcer disease, no bertin colored stools and no intolerance to fatty foods.   Genitourinary: no urinary frequency, no dysuria, no hematuria, no burning sensation during urination, urinary stream is not smaller and urinary stream does not start and stop.   Musculoskeletal: no arthralgias, no joint stiffness, no muscle weakness, no back pain and no difficulty walking.   Skin: no rashes, no change in skin color and pigmentation, no skin lesions and no skin lumps.   Neurological: no headaches, no dizziness, no seizures, no tingling, no numbness, no signs and symptoms of stroke and no limb weakness.   Psychiatric: no confusion, no memory lapses or  loss, no depression and no sleep disturbances.   Endocrine: no goiter, no thyroid disorder, no diabetes mellitus, no excessive thirst, no dry skin, no cold intolerance, no heat intolerance and no increased urinary frequency.   Hematologic/Lymphatic: is not slow to heal, does not bleed easily, does not bruise easily, no thrombophlebitis, no anemia and no history of blood transfusion.   All other systems have been reviewed and are negative for complaint.     Vitals:    02/19/24 1349   BP:    Pulse:    Resp:    Temp:    SpO2: 96%        Scheduled medications  aspirin, 81 mg, oral, Daily  carvedilol, 6.25 mg, oral, BID  clopidogrel, 75 mg, oral, Daily  docusate sodium, 100 mg, oral, BID  enoxaparin, 40 mg, subcutaneous, q24h  furosemide, 40 mg, intravenous, 2 times per day  furosemide, 80 mg, intravenous, Once  ipratropium-albuteroL, 3 mL, nebulization, TID  melatonin, 3 mg, oral, Daily  pantoprazole, 40 mg, oral, Daily before breakfast   Or  pantoprazole, 40 mg, intravenous, Daily before breakfast  rosuvastatin, 40 mg, oral, Daily  sacubitriL-valsartan, 2 tablet, oral, BID  tamsulosin, 0.4 mg, oral, Daily  tiotropium, 2 Inhalation, inhalation, Daily      Continuous medications     PRN medications  PRN medications: acetaminophen **OR** acetaminophen **OR** acetaminophen, acetaminophen, albuterol, guaiFENesin, ipratropium-albuteroL, ondansetron **OR** ondansetron, polyethylene glycol    Results from last 7 days   Lab Units 02/19/24  0801 02/18/24  1755   WBC AUTO x10*3/uL 3.7* 7.3   HEMOGLOBIN g/dL 10.0* 11.8*   HEMATOCRIT % 30.6* 36.6*   PLATELETS AUTO x10*3/uL 167 224     Results from last 7 days   Lab Units 02/19/24  0801 02/18/24  1755   SODIUM mmol/L 137 138   POTASSIUM mmol/L 4.1 4.0   CHLORIDE mmol/L 105 103   CO2 mmol/L 25 26   BUN mg/dL 28* 32*   CREATININE mg/dL 1.51* 1.99*   CALCIUM mg/dL 8.4* 8.8   PROTEIN TOTAL g/dL 6.8 8.1   BILIRUBIN TOTAL mg/dL 0.7 0.9   ALK PHOS U/L 45 57   ALT U/L 11 15   AST U/L 14 19    GLUCOSE mg/dL 135* 98     Results from last 7 days   Lab Units 02/18/24 2004 02/18/24  1755   TROPHS ng/L 23* 26*        CT angio chest for pulmonary embolism   Final Result   1.  No evidence of pulmonary emboli. There is similar appearance of   small right pleural effusion. Interval development of diffuse   interseptal thickening suggestive of pulmonary venous congestion.   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 Cardiology follow-up is recommended.   Enlarged main pulmonary trunk suggestive of pulmonary arterial   hypertension.   2. Moderate centrilobular emphysema             Signed by: Colby Gee 2/19/2024 12:24 AM   Dictation workstation:   [id number]      XR chest 1 view   Final Result   1.  Features suggesting pulmonary edema with cardiac enlargement.        Follow-up is advised        MACRO:   None        Signed by: Wojciech Ontiveros 2/18/2024 6:57 PM   Dictation workstation:   [id number]      Transthoracic Echo (TTE) Complete    (Results Pending)       Physical Exam     Constitutional   General appearance: Alert and in no acute distress.   Eyes   Inspection of eyes: Sclera and conjunctiva were normal.    Pupil exam: Pupils were equal in size. Extraocular movements were intact.   Pulmonary   Respiratory assessment: No respiratory distress, normal respiratory rhythm and effort.    Auscultation of Lungs: Bilateral crackles  Cardiovascular   Auscultation of heart: Regular rate rhythm systolic ejection murmur  Exam for edema: No peripheral edema.   Abdomen   Abdominal Exam: No bruits, normal bowel sounds, soft, non-tender, no abdominal mass palpated.    Liver and Spleen exam: No hepato-splenomegaly.   Musculoskeletal   Examination of gait: Normal.    Inspection of digits and nails: No clubbing or cyanosis of the fingernails.    Inspection/palpation of joints, bones and muscles: No  joint swelling. Normal movement of all extremities.   Skin   Skin inspection: Normal skin color and pigmentation, normal skin turgor and no visible rash.   Neurologic   Cranial nerves: Nerves 2-12 were intact, no focal neuro defects.   Psychiatric   Orientation: Oriented to person, place, and time.    Mood and affect: Normal.       Assessment/Plan      #Acute on chronic systolic congestive heart failure  CT PE negative for clots  Recent echocardiogram reviewed  Start IV Lasix twice a day  Resume Entresto  Should be a good candidate for Farxiga/Jardiance  Will start upon discharge    #Chronic bronchitis/COPD  The patient has a.m. productive cough  We will start the patient on Spiriva  He is scheduled for outpatient lung function study but has not been done yet    #Coronary artery disease  Continue current medications including aspirin and statins along with Plavix    #Dyslipidemia  Stable continue statins with target LDL less than 70

## 2024-02-20 LAB
ANION GAP SERPL CALC-SCNC: 12 MMOL/L (ref 10–20)
BUN SERPL-MCNC: 34 MG/DL (ref 6–23)
CALCIUM SERPL-MCNC: 8.6 MG/DL (ref 8.6–10.3)
CHLORIDE SERPL-SCNC: 102 MMOL/L (ref 98–107)
CO2 SERPL-SCNC: 29 MMOL/L (ref 21–32)
CREAT SERPL-MCNC: 1.84 MG/DL (ref 0.5–1.3)
EGFRCR SERPLBLD CKD-EPI 2021: 38 ML/MIN/1.73M*2
ERYTHROCYTE [DISTWIDTH] IN BLOOD BY AUTOMATED COUNT: 14.6 % (ref 11.5–14.5)
GLUCOSE SERPL-MCNC: 98 MG/DL (ref 74–99)
HCT VFR BLD AUTO: 31.2 % (ref 41–52)
HGB BLD-MCNC: 10.3 G/DL (ref 13.5–17.5)
MCH RBC QN AUTO: 31.8 PG (ref 26–34)
MCHC RBC AUTO-ENTMCNC: 33 G/DL (ref 32–36)
MCV RBC AUTO: 96 FL (ref 80–100)
NRBC BLD-RTO: 0 /100 WBCS (ref 0–0)
PLATELET # BLD AUTO: 193 X10*3/UL (ref 150–450)
POTASSIUM SERPL-SCNC: 3.9 MMOL/L (ref 3.5–5.3)
RBC # BLD AUTO: 3.24 X10*6/UL (ref 4.5–5.9)
SODIUM SERPL-SCNC: 139 MMOL/L (ref 136–145)
WBC # BLD AUTO: 7.8 X10*3/UL (ref 4.4–11.3)

## 2024-02-20 PROCEDURE — 2500000004 HC RX 250 GENERAL PHARMACY W/ HCPCS (ALT 636 FOR OP/ED): Performed by: PHYSICIAN ASSISTANT

## 2024-02-20 PROCEDURE — 2500000001 HC RX 250 WO HCPCS SELF ADMINISTERED DRUGS (ALT 637 FOR MEDICARE OP): Performed by: STUDENT IN AN ORGANIZED HEALTH CARE EDUCATION/TRAINING PROGRAM

## 2024-02-20 PROCEDURE — 2500000002 HC RX 250 W HCPCS SELF ADMINISTERED DRUGS (ALT 637 FOR MEDICARE OP, ALT 636 FOR OP/ED): Mod: MUE | Performed by: STUDENT IN AN ORGANIZED HEALTH CARE EDUCATION/TRAINING PROGRAM

## 2024-02-20 PROCEDURE — 2500000002 HC RX 250 W HCPCS SELF ADMINISTERED DRUGS (ALT 637 FOR MEDICARE OP, ALT 636 FOR OP/ED): Performed by: INTERNAL MEDICINE

## 2024-02-20 PROCEDURE — 99233 SBSQ HOSP IP/OBS HIGH 50: CPT | Performed by: INTERNAL MEDICINE

## 2024-02-20 PROCEDURE — 1200000002 HC GENERAL ROOM WITH TELEMETRY DAILY

## 2024-02-20 PROCEDURE — 2500000001 HC RX 250 WO HCPCS SELF ADMINISTERED DRUGS (ALT 637 FOR MEDICARE OP): Performed by: INTERNAL MEDICINE

## 2024-02-20 PROCEDURE — 2500000001 HC RX 250 WO HCPCS SELF ADMINISTERED DRUGS (ALT 637 FOR MEDICARE OP): Performed by: PHYSICIAN ASSISTANT

## 2024-02-20 PROCEDURE — RXMED WILLOW AMBULATORY MEDICATION CHARGE

## 2024-02-20 PROCEDURE — 36415 COLL VENOUS BLD VENIPUNCTURE: CPT | Performed by: NURSE PRACTITIONER

## 2024-02-20 PROCEDURE — 94762 N-INVAS EAR/PLS OXIMTRY CONT: CPT

## 2024-02-20 PROCEDURE — 94640 AIRWAY INHALATION TREATMENT: CPT | Mod: MUE

## 2024-02-20 PROCEDURE — 85027 COMPLETE CBC AUTOMATED: CPT | Performed by: NURSE PRACTITIONER

## 2024-02-20 PROCEDURE — 99232 SBSQ HOSP IP/OBS MODERATE 35: CPT | Performed by: INTERNAL MEDICINE

## 2024-02-20 PROCEDURE — 94668 MNPJ CHEST WALL SBSQ: CPT

## 2024-02-20 PROCEDURE — 99232 SBSQ HOSP IP/OBS MODERATE 35: CPT | Performed by: NURSE PRACTITIONER

## 2024-02-20 PROCEDURE — 80048 BASIC METABOLIC PNL TOTAL CA: CPT | Performed by: NURSE PRACTITIONER

## 2024-02-20 RX ORDER — SPIRONOLACTONE 25 MG/1
12.5 TABLET ORAL DAILY
Qty: 15 TABLET | Refills: 0 | Status: SHIPPED | OUTPATIENT
Start: 2024-02-21 | End: 2024-03-05 | Stop reason: SDUPTHER

## 2024-02-20 RX ORDER — FUROSEMIDE 10 MG/ML
80 INJECTION INTRAMUSCULAR; INTRAVENOUS
Status: DISCONTINUED | OUTPATIENT
Start: 2024-02-21 | End: 2024-02-21 | Stop reason: HOSPADM

## 2024-02-20 RX ADMIN — ENOXAPARIN SODIUM 40 MG: 40 INJECTION SUBCUTANEOUS at 09:58

## 2024-02-20 RX ADMIN — ASPIRIN 81 MG: 81 TABLET, COATED ORAL at 09:11

## 2024-02-20 RX ADMIN — CARVEDILOL 6.25 MG: 6.25 TABLET, FILM COATED ORAL at 20:32

## 2024-02-20 RX ADMIN — EMPAGLIFLOZIN 10 MG: 10 TABLET, FILM COATED ORAL at 09:11

## 2024-02-20 RX ADMIN — IPRATROPIUM BROMIDE AND ALBUTEROL SULFATE 3 ML: 2.5; .5 SOLUTION RESPIRATORY (INHALATION) at 19:28

## 2024-02-20 RX ADMIN — SPIRONOLACTONE 12.5 MG: 25 TABLET ORAL at 09:14

## 2024-02-20 RX ADMIN — CLOPIDOGREL 75 MG: 75 TABLET ORAL at 09:12

## 2024-02-20 RX ADMIN — FUROSEMIDE 40 MG: 10 INJECTION, SOLUTION INTRAMUSCULAR; INTRAVENOUS at 09:10

## 2024-02-20 RX ADMIN — SACUBITRIL AND VALSARTAN 2 TABLET: 49; 51 TABLET, FILM COATED ORAL at 20:32

## 2024-02-20 RX ADMIN — SACUBITRIL AND VALSARTAN 2 TABLET: 49; 51 TABLET, FILM COATED ORAL at 09:11

## 2024-02-20 RX ADMIN — DOCUSATE SODIUM 100 MG: 100 CAPSULE, LIQUID FILLED ORAL at 20:32

## 2024-02-20 RX ADMIN — IPRATROPIUM BROMIDE AND ALBUTEROL SULFATE 3 ML: .5; 3 SOLUTION RESPIRATORY (INHALATION) at 14:26

## 2024-02-20 RX ADMIN — TIOTROPIUM BROMIDE INHALATION SPRAY 2 PUFF: 3.12 SPRAY, METERED RESPIRATORY (INHALATION) at 09:23

## 2024-02-20 RX ADMIN — TAMSULOSIN HYDROCHLORIDE 0.4 MG: 0.4 CAPSULE ORAL at 09:11

## 2024-02-20 RX ADMIN — IPRATROPIUM BROMIDE AND ALBUTEROL SULFATE 3 ML: 2.5; .5 SOLUTION RESPIRATORY (INHALATION) at 09:18

## 2024-02-20 RX ADMIN — ROSUVASTATIN CALCIUM 40 MG: 20 TABLET, FILM COATED ORAL at 09:11

## 2024-02-20 ASSESSMENT — COGNITIVE AND FUNCTIONAL STATUS - GENERAL
DRESSING REGULAR UPPER BODY CLOTHING: A LITTLE
TOILETING: A LITTLE
DAILY ACTIVITIY SCORE: 21
DRESSING REGULAR UPPER BODY CLOTHING: A LITTLE
DAILY ACTIVITIY SCORE: 21
CLIMB 3 TO 5 STEPS WITH RAILING: A LITTLE
TOILETING: A LITTLE
DRESSING REGULAR LOWER BODY CLOTHING: A LITTLE
MOBILITY SCORE: 23
DRESSING REGULAR LOWER BODY CLOTHING: A LITTLE
CLIMB 3 TO 5 STEPS WITH RAILING: A LITTLE
MOBILITY SCORE: 23

## 2024-02-20 ASSESSMENT — PAIN SCALES - GENERAL
PAINLEVEL_OUTOF10: 0 - NO PAIN
PAINLEVEL_OUTOF10: 0 - NO PAIN

## 2024-02-20 NOTE — CARE PLAN
Problem: Fall/Injury  Goal: Not fall by end of shift  2/20/2024 0208 by Sigifredo Gaona LPN  Outcome: Not met  2/20/2024 0208 by Sigifredo Gaona LPN  Outcome: Progressing  Goal: Be free from injury by end of the shift  2/20/2024 0208 by Sigifredo Gaona LPN  Outcome: Not met  2/20/2024 0208 by Sigifredo Gaona LPN  Outcome: Progressing  Goal: Verbalize understanding of personal risk factors for fall in the hospital  2/20/2024 0208 by Sigifredo Gaona LPN  Outcome: Not met  2/20/2024 0208 by Sigifredo Gaona LPN  Outcome: Progressing  Goal: Verbalize understanding of risk factor reduction measures to prevent injury from fall in the home  2/20/2024 0208 by Sigifredo Gaona LPN  Outcome: Not met  2/20/2024 0208 by Sigifredo Gaona LPN  Outcome: Progressing  Goal: Use assistive devices by end of the shift  2/20/2024 0208 by Sigifredo Gaona LPN  Outcome: Not met  2/20/2024 0208 by Sigifredo Gaona LPN  Outcome: Progressing  Goal: Pace activities to prevent fatigue by end of the shift  2/20/2024 0208 by Sigifredo Gaona LPN  Outcome: Not met  2/20/2024 0208 by Sigifredo Gaona LPN  Outcome: Progressing     Problem: Respiratory  Goal: Clear secretions with interventions this shift  2/20/2024 0208 by Sigifredo Gaona LPN  Outcome: Not met  2/20/2024 0208 by Sigifredo Gaona LPN  Outcome: Progressing  Goal: Minimize anxiety/maximize coping throughout shift  2/20/2024 0208 by Sigifredo Gaona LPN  Outcome: Not met  2/20/2024 0208 by Sigifredo Gaona LPN  Outcome: Progressing  Goal: Minimal/no exertional discomfort or dyspnea this shift  2/20/2024 0208 by Sigifredo Gaona LPN  Outcome: Not met  2/20/2024 0208 by Sigifredo Gaona LPN  Outcome: Progressing  Goal: No signs of respiratory distress (eg. Use of accessory muscles. Peds grunting)  2/20/2024 0208 by Sigifredo Goana LPN  Outcome: Not met  2/20/2024 0208 by Sigifredo Gaona LPN  Outcome: Progressing  Goal: Patent airway  maintained this shift  2/20/2024 0208 by Sigifredo Gaona LPN  Outcome: Not met  2/20/2024 0208 by Sigifredo Gaona LPN  Outcome: Progressing  Goal: Tolerate mechanical ventilation evidenced by VS/agitation level this shift  2/20/2024 0208 by Sigifredo Ganoa LPN  Outcome: Not met  2/20/2024 0208 by Sigifredo Gaoan LPN  Outcome: Progressing  Goal: Tolerate pulmonary toileting this shift  2/20/2024 0208 by Sigifredo Gaona LPN  Outcome: Not met  2/20/2024 0208 by Sigifredo Gaona LPN  Outcome: Progressing  Goal: Verbalize decreased shortness of breath this shift  2/20/2024 0208 by Sigifredo Gaona LPN  Outcome: Not met  2/20/2024 0208 by Sigifredo Gaona LPN  Outcome: Progressing  Goal: Wean oxygen to maintain O2 saturation per order/standard this shift  2/20/2024 0208 by Sigifredo Gaona LPN  Outcome: Not met  2/20/2024 0208 by Sigifredo Gaona LPN  Outcome: Progressing  Goal: Increase self care and/or family involvement in next 24 hours  2/20/2024 0208 by Sigifredo Gaona LPN  Outcome: Not met  2/20/2024 0208 by Sigifredo Gaona LPN  Outcome: Progressing     Problem: Heart Failure  Goal: Improved gas exchange this shift  2/20/2024 0208 by Sigifredo Gaona LPN  Outcome: Not met  2/20/2024 0208 by Sigifredo Gaona LPN  Outcome: Progressing  Goal: Improved urinary output this shift  2/20/2024 0208 by Sigifredo Gaona LPN  Outcome: Not met  2/20/2024 0208 by Sigifredo Gaona LPN  Outcome: Progressing  Goal: Reduction in peripheral edema within 24 hours  2/20/2024 0208 by Sigifredo Gaona LPN  Outcome: Not met  2/20/2024 0208 by Sigifredo Gaona LPN  Outcome: Progressing  Goal: Report improvement of dyspnea/breathlessness this shift  2/20/2024 0208 by Sigifredo Gaona LPN  Outcome: Not met  2/20/2024 0208 by Sigifredo Gaona LPN  Outcome: Progressing  Goal: Weight from fluid excess reduced over 2-3 days, then stabilize  2/20/2024 0208 by Sigifredo Gaona LPN  Outcome: Not  met  2/20/2024 0208 by Sigifredo Gaona LPN  Outcome: Progressing  Goal: Increase self care and/or family involvement in 24 hours  2/20/2024 0208 by Sigifredo Gaona LPN  Outcome: Not met  2/20/2024 0208 by Sigifredo Gaona LPN  Outcome: Progressing   The patient's goals for the shift include Sleep    The clinical goals for the shift include wing pt off 2L to 1L by end of shift. Baseline is roomair.

## 2024-02-20 NOTE — PROGRESS NOTES
Wojciech Kern is a 73 y.o. male on day 1 of admission presenting with Shortness of breath.    Subjective   Walking around room.  Denies SOB.    Tells me he was coughing this morning and brought up a little bit of blood, red in color.  No episodes since.  Has happened in the past when he was diagnosed with pneumonia.     Concerned that his insomnia is related to hypoxia at night- looking forward to nocturnal pulse ox study tonight.          Objective     Physical Exam  Vitals reviewed.   Constitutional:       General: He is not in acute distress.     Appearance: Normal appearance. He is not ill-appearing, toxic-appearing or diaphoretic.   HENT:      Head: Normocephalic.      Nose: Nose normal.      Mouth/Throat:      Mouth: Mucous membranes are moist.      Pharynx: Oropharynx is clear.   Eyes:      General: Lids are normal. Gaze aligned appropriately. No scleral icterus.     Extraocular Movements: Extraocular movements intact.      Conjunctiva/sclera: Conjunctivae normal.      Pupils: Pupils are equal, round, and reactive to light.   Cardiovascular:      Rate and Rhythm: Normal rate and regular rhythm.      Pulses: Normal pulses.      Heart sounds: Normal heart sounds. No murmur heard.  Pulmonary:      Effort: Pulmonary effort is normal.      Breath sounds: Normal air entry. Examination of the right-lower field reveals decreased breath sounds. Examination of the left-lower field reveals decreased breath sounds. Decreased breath sounds present.   Abdominal:      General: Abdomen is flat. Bowel sounds are normal.      Palpations: Abdomen is soft.      Tenderness: There is no abdominal tenderness.   Musculoskeletal:         General: Normal range of motion.      Cervical back: Full passive range of motion without pain and normal range of motion.      Right lower leg: No edema.      Left lower leg: No edema.   Skin:     General: Skin is warm and dry.      Capillary Refill: Capillary refill takes less than 2 seconds.  "  Neurological:      General: No focal deficit present.      Mental Status: He is alert and oriented to person, place, and time.      Motor: Motor function is intact.      Coordination: Coordination is intact.   Psychiatric:         Attention and Perception: Attention normal.         Mood and Affect: Mood normal.         Speech: Speech normal.         Behavior: Behavior normal. Behavior is cooperative.         Last Recorded Vitals  Blood pressure 130/72, pulse 60, temperature 36.6 °C (97.9 °F), resp. rate 16, height 1.75 m (5' 8.9\"), weight 73 kg (160 lb 15 oz), SpO2 94 %.  Intake/Output last 3 Shifts:  I/O last 3 completed shifts:  In: 770 (10.5 mL/kg) [P.O.:720; I.V.:50 (0.7 mL/kg)]  Out: 775 (10.6 mL/kg) [Urine:775 (0.3 mL/kg/hr)]  Weight: 73 kg     Relevant Results                  Scheduled medications  aspirin, 81 mg, oral, Daily  carvedilol, 6.25 mg, oral, BID  clopidogrel, 75 mg, oral, Daily  docusate sodium, 100 mg, oral, BID  empagliflozin, 10 mg, oral, Daily  enoxaparin, 40 mg, subcutaneous, q24h  [START ON 2/21/2024] furosemide, 80 mg, intravenous, 2 times per day  ipratropium-albuteroL, 3 mL, nebulization, TID  melatonin, 3 mg, oral, Daily  pantoprazole, 40 mg, oral, Daily before breakfast   Or  pantoprazole, 40 mg, intravenous, Daily before breakfast  perflutren lipid microspheres, 0.5-10 mL of dilution, intravenous, Once in imaging  rosuvastatin, 40 mg, oral, Daily  sacubitriL-valsartan, 2 tablet, oral, BID  spironolactone, 12.5 mg, oral, Daily  tamsulosin, 0.4 mg, oral, Daily  tiotropium, 2 Inhalation, inhalation, Daily      Continuous medications     PRN medications  PRN medications: acetaminophen **OR** acetaminophen **OR** acetaminophen, acetaminophen, albuterol, guaiFENesin, ipratropium-albuteroL, ondansetron **OR** ondansetron, polyethylene glycol       Results for orders placed or performed during the hospital encounter of 02/18/24 (from the past 24 hour(s))   CBC   Result Value Ref Range    " WBC 7.8 4.4 - 11.3 x10*3/uL    nRBC 0.0 0.0 - 0.0 /100 WBCs    RBC 3.24 (L) 4.50 - 5.90 x10*6/uL    Hemoglobin 10.3 (L) 13.5 - 17.5 g/dL    Hematocrit 31.2 (L) 41.0 - 52.0 %    MCV 96 80 - 100 fL    MCH 31.8 26.0 - 34.0 pg    MCHC 33.0 32.0 - 36.0 g/dL    RDW 14.6 (H) 11.5 - 14.5 %    Platelets 193 150 - 450 x10*3/uL   Basic Metabolic Panel   Result Value Ref Range    Glucose 98 74 - 99 mg/dL    Sodium 139 136 - 145 mmol/L    Potassium 3.9 3.5 - 5.3 mmol/L    Chloride 102 98 - 107 mmol/L    Bicarbonate 29 21 - 32 mmol/L    Anion Gap 12 10 - 20 mmol/L    Urea Nitrogen 34 (H) 6 - 23 mg/dL    Creatinine 1.84 (H) 0.50 - 1.30 mg/dL    eGFR 38 (L) >60 mL/min/1.73m*2    Calcium 8.6 8.6 - 10.3 mg/dL       CT angio chest for pulmonary embolism    Result Date: 2/19/2024  Interpreted By:  Colby Gee, STUDY: CT ANGIO CHEST FOR PULMONARY EMBOLISM;  2/18/2024 11:40 pm   INDICATION: Signs/Symptoms:sob and pain.   COMPARISON: Chest CT dated 12/08/2022.   ACCESSION NUMBER(S): [id number]   ORDERING CLINICIAN: KIRK MORALES   TECHNIQUE: Helical data acquisition of the chest was obtained contrast volume: 75 mL IV contrast Omnipaque 350. Dual energy iodine mapping of the pulmonary arterial branches was utilized. Axial contiguous images were reformatted in coronal and sagittal planes. Axial and coronal MIP images were created and reviewed.   FINDINGS: POTENTIAL LIMITATIONS OF THE STUDY: Motion and mixing artifact which limits evaluation of the distal branch vessels.   HEART AND VESSELS: No discrete filling defects within the main pulmonary artery or its branches.   Main pulmonary trunk is enlarged measuring 3.5 cm in caliber suggestive of pulmonary arterial hypertension similar to prior.   The thoracic aorta is of normal course and caliber.   Heavy coronary artery calcifications are seen.The study is not optimized for evaluation of coronary arteries.   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 Sternotomy changes.   No evidence of pericardial effusion.   MEDIASTINUM AND ALEXANDRO, LOWER NECK AND AXILLA: The visualized thyroid gland is within normal limits.   No evidence of thoracic lymphadenopathy by CT criteria.   Esophagus appears within normal limits as seen.   LUNGS AND AIRWAYS: The trachea and central airways are patent. No endobronchial lesion.   There is moderate centrilobular emphysema. There is diffuse interseptal thickening suggestive of pulmonary venous congestion new from prior. There is bibasilar subsegmental atelectasis. There is a small right pleural effusion and no significant pleural effusion on the left similar to prior.   UPPER ABDOMEN: No acute findings. A few hepatic cysts are visualized. Proteinaceous right upper pole exophytic renal cyst is also visualized.   CHEST WALL AND OSSEOUS STRUCTURES: There are no suspicious osseous lesions. The visualized osseous structures are intact.       1.  No evidence of pulmonary emboli. There is similar appearance of small right pleural effusion. Interval development of diffuse interseptal thickening suggestive of pulmonary venous congestion. 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 Cardiology follow-up is recommended. Enlarged main pulmonary trunk suggestive of pulmonary arterial hypertension. 2. Moderate centrilobular emphysema     Signed by: Colby Gee 2/19/2024 12:24 AM Dictation workstation:   [id number]    XR chest 1 view    Result Date: 2/18/2024  Interpreted By:  Wojciech Ontiveros, STUDY: XR CHEST 1 VIEW;  2/18/2024 6:15 pm   INDICATION: Signs/Symptoms:sob/pain.   COMPARISON: 08/20/2023   ACCESSION NUMBER(S): [id number]   ORDERING CLINICIAN: KIRK MORALES   FINDINGS: Heart size is enlarged. Features suggesting  pulmonary edema. Appearance is slightly improved from prior examination but does persist. Hyperinflation. No pneumothorax       1.  Features suggesting pulmonary edema with cardiac enlargement.   Follow-up is advised   MACRO: None   Signed by: Wojciech Ontiveros 2/18/2024 6:57 PM Dictation workstation:   [id number]              Assessment/Plan     Wojciech Kern is a 74 yo male with PMH of HTN, HLD, COPD, CKD, CAD/inferior MI s/p 3DES to RCA developing a VSD s/p CABG, VSD repair 2015, cardiomyopathy s/p ICD placement 2018. He presented to [facility] ED with SOB.  He recently traveled back from [place] and noted worsening shortness of breath, not improved by his rescue inhalers, and orthopnea.   In ED, CXR showed cardiomegaly with pulmonary edema, CTA Chest was negative for PE but showed pulmonary venous congestion. BNP >4700.  He was treated with IV lasix, duonebs, and admitted for further evaluation and treatment.     Shortness of breath   Acute exacerbation of systolic heart failure, ICM  - appreciate cardiology consultation   - last echo 10/2023 LVEF 25-30%.  Repeat echocardiogram pending   - continue IV lasix per cardiology   - continue home coreg, entresto. start spironolactone and jardiance (price checked $47/month, will address with patient)  - hematuria reported x 1 today-- CXR and CTA Chest completed 2/18 without signs of PE or pneumonia.  ? Related to frequent coughing?-- continue to monitor and consider further evaluation if persists.     COPD   - per recent Pulmonology visit, PFTs showed no obvious obstruction but there was air trapping-- not started on inhalers at that time   - started on spiriva here (price checked $47/month, will address with patient)   - missed pulmology appointment yesterday, requested hospital follow up visit   - nocturnal pulse ox study today     HTN   - blood pressures stable, continue current regimen     HLD   - continue statin     CKD   - creatinine appears to be near/slightly above  baseline.  Monitor closely with IV diuresis     CAD   - continue aspirin, plavix, statin     VTE/GI Prophylaxis   - subcutaneous loveonx   - bowel regimen in place     Discharge Planning   - plan to discharge home when medically stable   - would benefit from follow up with [facility] program, referral placed            I spent 40 minutes in the professional and overall care of this patient.      Janny Segura, APRN-CNP

## 2024-02-20 NOTE — PROGRESS NOTES
Subjective Data:  Patient seen and examined, chart reviewed  -- Overall, feels better, oxygen has been weaned down.  -- Plan for nocturnal pulse oxygenation study tonight      Objective Data:  Last Recorded Vitals:  Vitals:    02/20/24 0918 02/20/24 0923 02/20/24 1426 02/20/24 1552   BP:    130/72   Patient Position:       Pulse:    60   Resp:    16   Temp:    36.6 °C (97.9 °F)   TempSrc:       SpO2: 96% 96% 95% 94%   Weight:       Height:           Last Labs:  CBC - 2/20/2024:  5:29 AM  7.8 10.3 193    31.2      CMP - 2/20/2024:  5:30 AM  8.6 6.8 14 --- 0.7   _ 3.7 11 45      PTT - No results in last year.  1.2   13.6 _     TROPHS   Date/Time Value Ref Range Status   02/18/2024 08:04 PM 23 0 - 20 ng/L Final   02/18/2024 05:55 PM 26 0 - 20 ng/L Final   08/20/2023 10:51 PM 13 0 - 20 ng/L Final     Comment:     .  Less than 99th percentile of normal range cutoff-  Female and children under 18 years old <14 ng/L; Male <21 ng/L: Negative  Repeat testing should be performed if clinically indicated.   .  Female and children under 18 years old 14-50 ng/L; Male 21-50 ng/L:  Consistent with possible cardiac damage and possible increased clinical   risk. Serial measurements may help to assess extent of myocardial damage.   .  >50 ng/L: Consistent with cardiac damage, increased clinical risk and  myocardial infarction. Serial measurements may help assess extent of   myocardial damage.   .   NOTE: Children less than 1 year old may have higher baseline troponin   levels and results should be interpreted in conjunction with the overall   clinical context.   .  NOTE: Troponin I testing is performed using a different   testing methodology at [facility] than at other   [facility] hospitals. Direct result comparisons should only   be made within the same method.     07/05/2023 04:01 PM 12 0 - 20 ng/L Final     Comment:     .  Less than 99th percentile of normal range cutoff-  Female and children under 18 years old <14 ng/L;  Male <21 ng/L: Negative  Repeat testing should be performed if clinically indicated.   .  Female and children under 18 years old 14-50 ng/L; Male 21-50 ng/L:  Consistent with possible cardiac damage and possible increased clinical   risk. Serial measurements may help to assess extent of myocardial damage.   .  >50 ng/L: Consistent with cardiac damage, increased clinical risk and  myocardial infarction. Serial measurements may help assess extent of   myocardial damage.   .   NOTE: Children less than 1 year old may have higher baseline troponin   levels and results should be interpreted in conjunction with the overall   clinical context.   .  NOTE: Troponin I testing is performed using a different   testing methodology at [facility] than at other   [facility]. Direct result comparisons should only   be made within the same method.     11/21/2022 10:08 AM 1,068 0 - 20 ng/L Final     Comment:     .  Less than 99th percentile of normal range cutoff-  Female and children under 18 years old <14 ng/L; Male <21 ng/L: Negative  Repeat testing should be performed if clinically indicated.   .  Female and children under 18 years old 14-50 ng/L; Male 21-50 ng/L:  Consistent with possible cardiac damage and possible increased clinical   risk. Serial measurements may help to assess extent of myocardial damage.   .  >50 ng/L: Consistent with cardiac damage, increased clinical risk and  myocardial infarction. Serial measurements may help assess extent of   myocardial damage.   .   NOTE: Children less than 1 year old may have higher baseline troponin   levels and results should be interpreted in conjunction with the overall   clinical context.   .  NOTE: Troponin I testing is performed using a different   testing methodology at [facility] than at other   [facility]. Direct result comparisons should only   be made within the same method.  This is a critical result.    Per Laboratory policy,  critical results for this test   only qualify to the call list once per 24 hours.        BNP   Date/Time Value Ref Range Status   02/18/2024 05:55 PM >4,700 0 - 99 pg/mL Final   08/20/2023 10:51 PM 2,726 0 - 99 pg/mL Final     Comment:     .  <100 pg/mL - Heart failure unlikely  100-299 pg/mL - Intermediate probability of acute heart  .               failure exacerbation. Correlate with clinical  .               context and patient history.    >=300 pg/mL - Heart Failure likely. Correlate with clinical  .               context and patient history.  BNP testing is performed using different testing   methodology at [facility] than at other   [facility]. Direct result comparisons should   only be made within the same method.     07/05/2023 04:01  0 - 99 pg/mL Final     Comment:     .  <100 pg/mL - Heart failure unlikely  100-299 pg/mL - Intermediate probability of acute heart  .               failure exacerbation. Correlate with clinical  .               context and patient history.    >=300 pg/mL - Heart Failure likely. Correlate with clinical  .               context and patient history.  BNP testing is performed using different testing   methodology at [facility] than at other   [facility] hospitals. Direct result comparisons should   only be made within the same method.       VLDL   Date/Time Value Ref Range Status   06/21/2023 09:16 AM 9 0 - 40 mg/dL Final   06/17/2022 09:00 AM 10 0 - 40 mg/dL Final   09/03/2021 10:07 AM 10 0 - 40 mg/dL Final      Last I/O:  I/O last 3 completed shifts:  In: 770 (10.5 mL/kg) [P.O.:720; I.V.:50 (0.7 mL/kg)]  Out: 775 (10.6 mL/kg) [Urine:775 (0.3 mL/kg/hr)]  Weight: 73 kg     Past Cardiology Tests (Last 3 Years):  EKG:  ECG 12 Lead  (Preliminary)    Echo:  Transthoracic echo (TTE) complete 11/07/2023    Ejection Fractions:  EF   Date/Time Value Ref Range Status   11/07/2023 12:27 PM 29       Cath:  No results found for this or any previous visit  from the past 1095 days.    Stress Test:  No results found for this or any previous visit from the past 1095 days.    Cardiac Imaging:  No results found for this or any previous visit from the past 1095 days.      Inpatient Medications:  Scheduled medications   Medication Dose Route Frequency    aspirin  81 mg oral Daily    carvedilol  6.25 mg oral BID    clopidogrel  75 mg oral Daily    docusate sodium  100 mg oral BID    empagliflozin  10 mg oral Daily    enoxaparin  40 mg subcutaneous q24h    [START ON 2/21/2024] furosemide  80 mg intravenous 2 times per day    ipratropium-albuteroL  3 mL nebulization TID    melatonin  3 mg oral Daily    pantoprazole  40 mg oral Daily before breakfast    Or    pantoprazole  40 mg intravenous Daily before breakfast    perflutren lipid microspheres  0.5-10 mL of dilution intravenous Once in imaging    rosuvastatin  40 mg oral Daily    sacubitriL-valsartan  2 tablet oral BID    spironolactone  12.5 mg oral Daily    tamsulosin  0.4 mg oral Daily    tiotropium  2 Inhalation inhalation Daily     PRN medications   Medication    acetaminophen    Or    acetaminophen    Or    acetaminophen    acetaminophen    albuterol    guaiFENesin    ipratropium-albuteroL    ondansetron    Or    ondansetron    polyethylene glycol     Continuous Medications   Medication Dose Last Rate       Physical Exam:  PHYSICAL EXAMINATION  Temp:  [36.3 °C (97.3 °F)-36.7 °C (98 °F)] 36.6 °C (97.9 °F)  Heart Rate:  [44-64] 60  Resp:  [16-17] 16  BP: (114-140)/(71-82) 130/72    Net IO Since Admission: -5 mL [02/20/24 1604]    GENERAL APPEARANCE: Well developed, well nourished, in no acute distress.   VITAL SIGNS: reviewed and confirmed.   NECK: Supple and symmetric.  Neck veins remain elevated with JVD roughly 10 cm with head of bed 90 degrees  CHEST: Normal AP diameter and normal contour without any kyphoscoliosis.  LUNGS: Auscultation of the lungs revealed normal breath sounds with rales bilaterally in the  bases  CARDIOVASCULAR: There was a regular rate and rhythm without any murmurs, gallops, rubs. The carotid pulses were normal and 2+ bilaterally without bruits. Peripheral pulses were 2+ and symmetric.  ABDOMEN: Soft and nontender with normal bowel sounds.    MUSCULOSKELETAL: Gait was normal. There was no tenderness or effusions noted. Muscle strength and tone were normal.  EXTREMITIES: No cyanosis, clubbing or edema.  NEUROLOGIC: Alert and oriented x 3. Normal affect.      Assessment/Plan   73 year old  male with a PMH of ICM/CAD (inferior MI s/p 3 HELEN to RCA that was c/b guidewire dissection requiring 4th HELEN, developing a VSD s/p CABG x 1 (SVG-LAD) and VSD repair in 2015) and SQ ICD placement in 2018, hypertension, hyperlipidemia, COPD, CKD III. Cardiology consulted for ADHF.         #ADHF  #ICM/HFrEF (EF 25-30%), ACC/AHA class C  #CAD (inferior MI s/p 3 HELEN to RCA that was c/b guidewire dissection requiring 4th HELEN, developing a VSD s/p CABG x 1 (SVG-LAD) and VSD repair in 2015) and SQ ICD placement in 2018     -Dry weight: ~71kgs (in 11/2023, although he has SOB at that visit, so could be less than that)   -Admit weight: 73kgs   -BNP >4700  -ECG NSR, non ischemic   -CXR c/w pulm congestion   -TTE with EF of 25-30% in 10/2023   -Repeat TTE PENDING   -Diuresed with lasix 80mg IV, increase back to 80 mg twice daily --> anticipate at least torsemide 100 mg daily for diuretic  -Current GDMT:  *BB: Coreg 6.25mg BID   *ACE/ARB/ARNI: Entresto 49/51mg bid   *MRA:  Spironolactone 12.5mg daily  *SGLT2-inhibitor: Empagloflizin 10mg daily     Keep established outpatient follow-up with Dr. NEERAJ Panchal currently scheduled for March     Peripheral IV 02/18/24 20 G Left Antecubital (Active)   Site Assessment Clean;Dry;Intact 02/20/24 0900   Dressing Status Clean;Dry 02/20/24 0900   Number of days: 2       Code Status:  Full Code    I spent 45 minutes in the professional and overall care of this  patient.        Zackery Galdamez, DO

## 2024-02-20 NOTE — PROGRESS NOTES
Wojciech Kern is a 73 y.o. male     Patient feels a bit better compared to yesterday  Eager on getting nocturnal pulse oximetry done  Will order 1 for tonight    Review of Systems     Constitutional: no fever, no chills, not feeling poorly, not feeling tired   Cardiovascular: no chest pain   Respiratory: Cough  Gastrointestinal: no abdominal pain, no constipation, no melena, no nausea, no diarrhea, no vomiting and no blood in stools.   Neurological: no headache,   All other systems have been reviewed and are negative for complaint.       Vitals:    02/20/24 1426   BP:    Pulse:    Resp:    Temp:    SpO2: 95%        Scheduled medications  aspirin, 81 mg, oral, Daily  carvedilol, 6.25 mg, oral, BID  clopidogrel, 75 mg, oral, Daily  docusate sodium, 100 mg, oral, BID  empagliflozin, 10 mg, oral, Daily  enoxaparin, 40 mg, subcutaneous, q24h  [START ON 2/21/2024] furosemide, 80 mg, intravenous, 2 times per day  ipratropium-albuteroL, 3 mL, nebulization, TID  melatonin, 3 mg, oral, Daily  pantoprazole, 40 mg, oral, Daily before breakfast   Or  pantoprazole, 40 mg, intravenous, Daily before breakfast  perflutren lipid microspheres, 0.5-10 mL of dilution, intravenous, Once in imaging  rosuvastatin, 40 mg, oral, Daily  sacubitriL-valsartan, 2 tablet, oral, BID  spironolactone, 12.5 mg, oral, Daily  tamsulosin, 0.4 mg, oral, Daily  tiotropium, 2 Inhalation, inhalation, Daily      Continuous medications     PRN medications  PRN medications: acetaminophen **OR** acetaminophen **OR** acetaminophen, acetaminophen, albuterol, guaiFENesin, ipratropium-albuteroL, ondansetron **OR** ondansetron, polyethylene glycol    Lab Review   Results from last 7 days   Lab Units 02/20/24  0529 02/19/24  0801 02/18/24  1755   WBC AUTO x10*3/uL 7.8 3.7* 7.3   HEMOGLOBIN g/dL 10.3* 10.0* 11.8*   HEMATOCRIT % 31.2* 30.6* 36.6*   PLATELETS AUTO x10*3/uL 193 167 224     Results from last 7 days   Lab Units 02/20/24  0530 02/19/24  0801 02/18/24  1757    SODIUM mmol/L 139 137 138   POTASSIUM mmol/L 3.9 4.1 4.0   CHLORIDE mmol/L 102 105 103   CO2 mmol/L 29 25 26   BUN mg/dL 34* 28* 32*   CREATININE mg/dL 1.84* 1.51* 1.99*   CALCIUM mg/dL 8.6 8.4* 8.8   PROTEIN TOTAL g/dL  --  6.8 8.1   BILIRUBIN TOTAL mg/dL  --  0.7 0.9   ALK PHOS U/L  --  45 57   ALT U/L  --  11 15   AST U/L  --  14 19   GLUCOSE mg/dL 98 135* 98     Results from last 7 days   Lab Units 02/18/24 2004 02/18/24  1755   TROPHS ng/L 23* 26*        CT angio chest for pulmonary embolism   Final Result   1.  No evidence of pulmonary emboli. There is similar appearance of   small right pleural effusion. Interval development of diffuse   interseptal thickening suggestive of pulmonary venous congestion.   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 Cardiology follow-up is recommended.   Enlarged main pulmonary trunk suggestive of pulmonary arterial   hypertension.   2. Moderate centrilobular emphysema             Signed by: Colby Gee 2/19/2024 12:24 AM   Dictation workstation:   [id number]      XR chest 1 view   Final Result   1.  Features suggesting pulmonary edema with cardiac enlargement.        Follow-up is advised        MACRO:   None        Signed by: Wojciech Ontiveros 2/18/2024 6:57 PM   Dictation workstation:   [id number]            Physical Exam     Constitutional   General appearance: Alert and in no acute distress.     Pulmonary   Respiratory assessment: No respiratory distress, normal respiratory rhythm and effort.    Auscultation of Lungs: Clear bilateral breath sounds.   Cardiovascular   Auscultation of heart: Apical pulse normal, heart rate and rhythm normal, normal S1 and S2, no murmurs and no pericardial rub.    Exam for edema: No peripheral edema.   Abdomen   Abdominal Exam: No bruits, normal bowel sounds, soft, non-tender, no abdominal mass palpated.    Liver and  Spleen exam: No hepato-splenomegaly.   Musculoskeletal   Examination of gait: ROM intact  Skin inspection: Normal skin color and pigmentation, normal skin turgor and no visible rash.   Neurologic   Cranial nerves: Nerves 2-12 were intact, no focal neuro defects.     Assessment/Plan      #Acute on chronic systolic congestive heart failure  Continue diuresis     #Chronic bronchitis/COPD  Continue Spiriva/DuoNebs  Nocturnal pulse oximetry  Is already scheduled for outpatient PFT     #Coronary artery disease  Continue current medications including aspirin and statins along with Plavix     #Dyslipidemia  Stable continue statins with target LDL less than 70

## 2024-02-21 ENCOUNTER — PHARMACY VISIT (OUTPATIENT)
Dept: PHARMACY | Facility: CLINIC | Age: 74
End: 2024-02-21
Payer: COMMERCIAL

## 2024-02-21 ENCOUNTER — APPOINTMENT (OUTPATIENT)
Dept: CARDIOLOGY | Facility: HOSPITAL | Age: 74
DRG: 291 | End: 2024-02-21
Payer: MEDICARE

## 2024-02-21 VITALS
DIASTOLIC BLOOD PRESSURE: 75 MMHG | HEART RATE: 67 BPM | SYSTOLIC BLOOD PRESSURE: 119 MMHG | BODY MASS INDEX: 23.84 KG/M2 | TEMPERATURE: 97.9 F | OXYGEN SATURATION: 98 % | RESPIRATION RATE: 16 BRPM | WEIGHT: 160.94 LBS | HEIGHT: 69 IN

## 2024-02-21 LAB
ANION GAP SERPL CALC-SCNC: 11 MMOL/L (ref 10–20)
ATRIAL RATE: 71 BPM
BNP SERPL-MCNC: 1257 PG/ML (ref 0–99)
BUN SERPL-MCNC: 34 MG/DL (ref 6–23)
CALCIUM SERPL-MCNC: 8.6 MG/DL (ref 8.6–10.3)
CHLORIDE SERPL-SCNC: 101 MMOL/L (ref 98–107)
CO2 SERPL-SCNC: 28 MMOL/L (ref 21–32)
CREAT SERPL-MCNC: 1.55 MG/DL (ref 0.5–1.3)
EGFRCR SERPLBLD CKD-EPI 2021: 46 ML/MIN/1.73M*2
ERYTHROCYTE [DISTWIDTH] IN BLOOD BY AUTOMATED COUNT: 14.2 % (ref 11.5–14.5)
GLUCOSE SERPL-MCNC: 91 MG/DL (ref 74–99)
HCT VFR BLD AUTO: 34.3 % (ref 41–52)
HGB BLD-MCNC: 11.4 G/DL (ref 13.5–17.5)
MCH RBC QN AUTO: 31.3 PG (ref 26–34)
MCHC RBC AUTO-ENTMCNC: 33.2 G/DL (ref 32–36)
MCV RBC AUTO: 94 FL (ref 80–100)
NRBC BLD-RTO: 0 /100 WBCS (ref 0–0)
P AXIS: 18 DEGREES
P OFFSET: 189 MS
P ONSET: 130 MS
PLATELET # BLD AUTO: 199 X10*3/UL (ref 150–450)
POTASSIUM SERPL-SCNC: 3.4 MMOL/L (ref 3.5–5.3)
PR INTERVAL: 168 MS
Q ONSET: 214 MS
QRS COUNT: 12 BEATS
QRS DURATION: 100 MS
QT INTERVAL: 434 MS
QTC CALCULATION(BAZETT): 471 MS
QTC FREDERICIA: 459 MS
R AXIS: -5 DEGREES
RBC # BLD AUTO: 3.64 X10*6/UL (ref 4.5–5.9)
SODIUM SERPL-SCNC: 137 MMOL/L (ref 136–145)
T AXIS: -65 DEGREES
T OFFSET: 431 MS
VENTRICULAR RATE: 71 BPM
WBC # BLD AUTO: 7 X10*3/UL (ref 4.4–11.3)

## 2024-02-21 PROCEDURE — 2500000001 HC RX 250 WO HCPCS SELF ADMINISTERED DRUGS (ALT 637 FOR MEDICARE OP): Performed by: STUDENT IN AN ORGANIZED HEALTH CARE EDUCATION/TRAINING PROGRAM

## 2024-02-21 PROCEDURE — 2500000002 HC RX 250 W HCPCS SELF ADMINISTERED DRUGS (ALT 637 FOR MEDICARE OP, ALT 636 FOR OP/ED): Performed by: INTERNAL MEDICINE

## 2024-02-21 PROCEDURE — 2500000004 HC RX 250 GENERAL PHARMACY W/ HCPCS (ALT 636 FOR OP/ED): Performed by: INTERNAL MEDICINE

## 2024-02-21 PROCEDURE — 2500000001 HC RX 250 WO HCPCS SELF ADMINISTERED DRUGS (ALT 637 FOR MEDICARE OP): Performed by: INTERNAL MEDICINE

## 2024-02-21 PROCEDURE — 99239 HOSP IP/OBS DSCHRG MGMT >30: CPT | Performed by: INTERNAL MEDICINE

## 2024-02-21 PROCEDURE — C9113 INJ PANTOPRAZOLE SODIUM, VIA: HCPCS | Performed by: PHYSICIAN ASSISTANT

## 2024-02-21 PROCEDURE — 94640 AIRWAY INHALATION TREATMENT: CPT | Mod: MUE

## 2024-02-21 PROCEDURE — 93005 ELECTROCARDIOGRAM TRACING: CPT

## 2024-02-21 PROCEDURE — 85027 COMPLETE CBC AUTOMATED: CPT | Performed by: NURSE PRACTITIONER

## 2024-02-21 PROCEDURE — 99232 SBSQ HOSP IP/OBS MODERATE 35: CPT | Performed by: INTERNAL MEDICINE

## 2024-02-21 PROCEDURE — 2500000002 HC RX 250 W HCPCS SELF ADMINISTERED DRUGS (ALT 637 FOR MEDICARE OP, ALT 636 FOR OP/ED): Performed by: NURSE PRACTITIONER

## 2024-02-21 PROCEDURE — 80048 BASIC METABOLIC PNL TOTAL CA: CPT | Performed by: NURSE PRACTITIONER

## 2024-02-21 PROCEDURE — 36415 COLL VENOUS BLD VENIPUNCTURE: CPT | Performed by: NURSE PRACTITIONER

## 2024-02-21 PROCEDURE — 2500000004 HC RX 250 GENERAL PHARMACY W/ HCPCS (ALT 636 FOR OP/ED): Performed by: PHYSICIAN ASSISTANT

## 2024-02-21 PROCEDURE — 94668 MNPJ CHEST WALL SBSQ: CPT

## 2024-02-21 PROCEDURE — 83880 ASSAY OF NATRIURETIC PEPTIDE: CPT | Performed by: INTERNAL MEDICINE

## 2024-02-21 PROCEDURE — 2500000002 HC RX 250 W HCPCS SELF ADMINISTERED DRUGS (ALT 637 FOR MEDICARE OP, ALT 636 FOR OP/ED): Performed by: STUDENT IN AN ORGANIZED HEALTH CARE EDUCATION/TRAINING PROGRAM

## 2024-02-21 PROCEDURE — RXMED WILLOW AMBULATORY MEDICATION CHARGE

## 2024-02-21 RX ORDER — POTASSIUM CHLORIDE 20 MEQ/1
20 TABLET, EXTENDED RELEASE ORAL ONCE
Status: COMPLETED | OUTPATIENT
Start: 2024-02-21 | End: 2024-02-21

## 2024-02-21 RX ORDER — TORSEMIDE 100 MG/1
100 TABLET ORAL DAILY
Qty: 30 TABLET | Refills: 0 | Status: SHIPPED | OUTPATIENT
Start: 2024-02-21 | End: 2024-03-05 | Stop reason: SDUPTHER

## 2024-02-21 RX ADMIN — CLOPIDOGREL 75 MG: 75 TABLET ORAL at 08:38

## 2024-02-21 RX ADMIN — SACUBITRIL AND VALSARTAN 2 TABLET: 49; 51 TABLET, FILM COATED ORAL at 08:37

## 2024-02-21 RX ADMIN — ENOXAPARIN SODIUM 40 MG: 40 INJECTION SUBCUTANEOUS at 08:36

## 2024-02-21 RX ADMIN — SPIRONOLACTONE 12.5 MG: 25 TABLET ORAL at 08:36

## 2024-02-21 RX ADMIN — ROSUVASTATIN CALCIUM 40 MG: 20 TABLET, FILM COATED ORAL at 08:36

## 2024-02-21 RX ADMIN — FUROSEMIDE 80 MG: 10 INJECTION, SOLUTION INTRAMUSCULAR; INTRAVENOUS at 08:36

## 2024-02-21 RX ADMIN — TIOTROPIUM BROMIDE INHALATION SPRAY 2 PUFF: 3.12 SPRAY, METERED RESPIRATORY (INHALATION) at 07:28

## 2024-02-21 RX ADMIN — FUROSEMIDE 80 MG: 10 INJECTION, SOLUTION INTRAMUSCULAR; INTRAVENOUS at 14:55

## 2024-02-21 RX ADMIN — IPRATROPIUM BROMIDE AND ALBUTEROL SULFATE 3 ML: 2.5; .5 SOLUTION RESPIRATORY (INHALATION) at 13:16

## 2024-02-21 RX ADMIN — EMPAGLIFLOZIN 10 MG: 10 TABLET, FILM COATED ORAL at 08:38

## 2024-02-21 RX ADMIN — TAMSULOSIN HYDROCHLORIDE 0.4 MG: 0.4 CAPSULE ORAL at 08:37

## 2024-02-21 RX ADMIN — PANTOPRAZOLE SODIUM 40 MG: 40 INJECTION, POWDER, FOR SOLUTION INTRAVENOUS at 06:28

## 2024-02-21 RX ADMIN — POTASSIUM CHLORIDE 20 MEQ: 1500 TABLET, EXTENDED RELEASE ORAL at 10:48

## 2024-02-21 RX ADMIN — ASPIRIN 81 MG: 81 TABLET, COATED ORAL at 08:37

## 2024-02-21 RX ADMIN — IPRATROPIUM BROMIDE AND ALBUTEROL SULFATE 3 ML: 2.5; .5 SOLUTION RESPIRATORY (INHALATION) at 07:23

## 2024-02-21 ASSESSMENT — COGNITIVE AND FUNCTIONAL STATUS - GENERAL
DAILY ACTIVITIY SCORE: 24
MOBILITY SCORE: 24

## 2024-02-21 NOTE — PROGRESS NOTES
02/21/24 0903   Discharge Planning   Patient expects to be discharged to: home with wife at dc     Appears that patient was able to remain of room air over night with  no desat, Does not appear as if patient qualifies for home supplemental home o2 at this time.

## 2024-02-21 NOTE — PROGRESS NOTES
Subjective Data:  Patient seen and examined, chart reviewed  -- Overall, feels better, oxygen has been weaned down.   -- I?O's are inaccurate  --       Objective Data:  Last Recorded Vitals:  Vitals:    02/21/24 0319 02/21/24 0723 02/21/24 0727 02/21/24 1132   BP:   138/86 116/72   BP Location:       Patient Position:       Pulse:   60 59   Resp:   16 16   Temp:   36.9 °C (98.4 °F) 36.6 °C (97.9 °F)   TempSrc:       SpO2: 92% 94% 94% 98%   Weight:       Height:         Results from last 7 days   Lab Units 02/21/24  0559 02/20/24  0529 02/19/24  0801   WBC AUTO x10*3/uL 7.0 7.8 3.7*   HEMOGLOBIN g/dL 11.4* 10.3* 10.0*   HEMATOCRIT % 34.3* 31.2* 30.6*   PLATELETS AUTO x10*3/uL 199 193 167       Results from last 7 days   Lab Units 02/21/24  0559 02/20/24  0530 02/19/24  0801 02/18/24  1755   SODIUM mmol/L 137 139 137 138   POTASSIUM mmol/L 3.4* 3.9 4.1 4.0   CHLORIDE mmol/L 101 102 105 103   CO2 mmol/L 28 29 25 26   BUN mg/dL 34* 34* 28* 32*   CREATININE mg/dL 1.55* 1.84* 1.51* 1.99*   CALCIUM mg/dL 8.6 8.6 8.4* 8.8   PROTEIN TOTAL g/dL  --   --  6.8 8.1   BILIRUBIN TOTAL mg/dL  --   --  0.7 0.9   ALK PHOS U/L  --   --  45 57   ALT U/L  --   --  11 15   AST U/L  --   --  14 19   GLUCOSE mg/dL 91 98 135* 98             TROPHS   Date/Time Value Ref Range Status   02/18/2024 08:04 PM 23 0 - 20 ng/L Final   02/18/2024 05:55 PM 26 0 - 20 ng/L Final   08/20/2023 10:51 PM 13 0 - 20 ng/L Final     Comment:     .  Less than 99th percentile of normal range cutoff-  Female and children under 18 years old <14 ng/L; Male <21 ng/L: Negative  Repeat testing should be performed if clinically indicated.   .  Female and children under 18 years old 14-50 ng/L; Male 21-50 ng/L:  Consistent with possible cardiac damage and possible increased clinical   risk. Serial measurements may help to assess extent of myocardial damage.   .  >50 ng/L: Consistent with cardiac damage, increased clinical risk and  myocardial infarction. Serial  measurements may help assess extent of   myocardial damage.   .   NOTE: Children less than 1 year old may have higher baseline troponin   levels and results should be interpreted in conjunction with the overall   clinical context.   .  NOTE: Troponin I testing is performed using a different   testing methodology at [facility] than at other   [facility]. Direct result comparisons should only   be made within the same method.     07/05/2023 04:01 PM 12 0 - 20 ng/L Final     Comment:     .  Less than 99th percentile of normal range cutoff-  Female and children under 18 years old <14 ng/L; Male <21 ng/L: Negative  Repeat testing should be performed if clinically indicated.   .  Female and children under 18 years old 14-50 ng/L; Male 21-50 ng/L:  Consistent with possible cardiac damage and possible increased clinical   risk. Serial measurements may help to assess extent of myocardial damage.   .  >50 ng/L: Consistent with cardiac damage, increased clinical risk and  myocardial infarction. Serial measurements may help assess extent of   myocardial damage.   .   NOTE: Children less than 1 year old may have higher baseline troponin   levels and results should be interpreted in conjunction with the overall   clinical context.   .  NOTE: Troponin I testing is performed using a different   testing methodology at [facility] than at other   [facility]. Direct result comparisons should only   be made within the same method.     11/21/2022 10:08 AM 1,068 0 - 20 ng/L Final     Comment:     .  Less than 99th percentile of normal range cutoff-  Female and children under 18 years old <14 ng/L; Male <21 ng/L: Negative  Repeat testing should be performed if clinically indicated.   .  Female and children under 18 years old 14-50 ng/L; Male 21-50 ng/L:  Consistent with possible cardiac damage and possible increased clinical   risk. Serial measurements may help to assess extent of myocardial damage.    .  >50 ng/L: Consistent with cardiac damage, increased clinical risk and  myocardial infarction. Serial measurements may help assess extent of   myocardial damage.   .   NOTE: Children less than 1 year old may have higher baseline troponin   levels and results should be interpreted in conjunction with the overall   clinical context.   .  NOTE: Troponin I testing is performed using a different   testing methodology at [facility] than at other   system [facility]. Direct result comparisons should only   be made within the same method.  This is a critical result.    Per Laboratory policy, critical results for this test   only qualify to the call list once per 24 hours.        BNP   Date/Time Value Ref Range Status   02/18/2024 05:55 PM >4,700 0 - 99 pg/mL Final   08/20/2023 10:51 PM 2,726 0 - 99 pg/mL Final     Comment:     .  <100 pg/mL - Heart failure unlikely  100-299 pg/mL - Intermediate probability of acute heart  .               failure exacerbation. Correlate with clinical  .               context and patient history.    >=300 pg/mL - Heart Failure likely. Correlate with clinical  .               context and patient history.  BNP testing is performed using different testing   methodology at [facility] than at other   [facility]. Direct result comparisons should   only be made within the same method.     07/05/2023 04:01  0 - 99 pg/mL Final     Comment:     .  <100 pg/mL - Heart failure unlikely  100-299 pg/mL - Intermediate probability of acute heart  .               failure exacerbation. Correlate with clinical  .               context and patient history.    >=300 pg/mL - Heart Failure likely. Correlate with clinical  .               context and patient history.  BNP testing is performed using different testing   methodology at [facility] than at other   [facility] hospitals. Direct result comparisons should   only be made within the same method.       VLDL    Date/Time Value Ref Range Status   06/21/2023 09:16 AM 9 0 - 40 mg/dL Final   06/17/2022 09:00 AM 10 0 - 40 mg/dL Final   09/03/2021 10:07 AM 10 0 - 40 mg/dL Final      Last I/O:  No intake/output data recorded.    Past Cardiology Tests (Last 3 Years):  EKG:  ECG 12 Lead  (Preliminary)    Echo:  Transthoracic echo (TTE) complete 11/07/2023    Ejection Fractions:  EF   Date/Time Value Ref Range Status   11/07/2023 12:27 PM 29       Cath:  No results found for this or any previous visit from the past 1095 days.    Stress Test:  No results found for this or any previous visit from the past 1095 days.    Cardiac Imaging:  No results found for this or any previous visit from the past 1095 days.      Inpatient Medications:  Scheduled medications   Medication Dose Route Frequency    aspirin  81 mg oral Daily    carvedilol  6.25 mg oral BID    clopidogrel  75 mg oral Daily    docusate sodium  100 mg oral BID    empagliflozin  10 mg oral Daily    enoxaparin  40 mg subcutaneous q24h    furosemide  80 mg intravenous 2 times per day    ipratropium-albuteroL  3 mL nebulization TID    melatonin  3 mg oral Daily    pantoprazole  40 mg oral Daily before breakfast    Or    pantoprazole  40 mg intravenous Daily before breakfast    rosuvastatin  40 mg oral Daily    sacubitriL-valsartan  2 tablet oral BID    spironolactone  12.5 mg oral Daily    tamsulosin  0.4 mg oral Daily    tiotropium  2 Inhalation inhalation Daily     PRN medications   Medication    acetaminophen    Or    acetaminophen    Or    acetaminophen    acetaminophen    albuterol    guaiFENesin    ipratropium-albuteroL    ondansetron    Or    ondansetron    polyethylene glycol     Continuous Medications   Medication Dose Last Rate       Physical Exam:  PHYSICAL EXAMINATION  Documented Vital Signs   Heart Rate:  [52-64]   Temp:  [36.4 °C (97.5 °F)-36.9 °C (98.4 °F)]   Resp:  [16]   BP: (116-144)/(72-86)   SpO2:  [92 %-100 %]   Temp:  [36.4 °C (97.5 °F)-36.9 °C (98.4 °F)]  36.6 °C (97.9 °F)  Heart Rate:  [52-64] 59  Resp:  [16] 16  BP: (116-144)/(72-86) 116/72           Documented Fluid Status   No intake or output data in the 24 hours ending 02/21/24 1244  Net IO Since Admission: -5 mL [02/21/24 1244]      GENERAL APPEARANCE: Well developed, well nourished, in no acute distress.   VITAL SIGNS: reviewed and confirmed.   NECK: Supple and symmetric.  Neck veins remain elevated with JVD roughly 10 cm with head of bed 90 degrees  CHEST: Normal AP diameter and normal contour without any kyphoscoliosis.  LUNGS: Auscultation of the lungs revealed normal breath sounds with rales bilaterally in the bases  CARDIOVASCULAR: There was a regular rate and rhythm without any murmurs, gallops, rubs. The carotid pulses were normal and 2+ bilaterally without bruits. Peripheral pulses were 2+ and symmetric.  ABDOMEN: Soft and nontender with normal bowel sounds.    MUSCULOSKELETAL: Gait was normal. There was no tenderness or effusions noted. Muscle strength and tone were normal.  EXTREMITIES: No cyanosis, clubbing or edema.  NEUROLOGIC: Alert and oriented x 3. Normal affect.      Assessment/Plan   73 year old  male with a PMH of ICM/CAD (inferior MI s/p 3 HELEN to RCA that was c/b guidewire dissection requiring 4th HELEN, developing a VSD s/p CABG x 1 (SVG-LAD) and VSD repair in 2015) and SQ ICD placement in 2018, hypertension, hyperlipidemia, COPD, CKD III. Cardiology consulted for ADHF.         #ADHF  #ICM/HFrEF (EF 25-30%), ACC/AHA class C  #CAD (inferior MI s/p 3 HELEN to RCA that was c/b guidewire dissection requiring 4th HELEN, developing a VSD s/p CABG x 1 (SVG-LAD) and VSD repair in 2015) and SQ ICD placement in 2018     -Dry weight: ~71kgs (in 11/2023, although he has SOB at that visit, so could be less than that)   -Admit weight: 73kgs  ( Has not been repeated )   -BNP >4700  -ECG NSR, non ischemic   -CXR c/w pulm congestion   -TTE with EF of 25-30% in 10/2023   -Repeat TTE PENDING     -Current  GDMT:  *BB: Coreg 6.25mg BID   *ACE/ARB/ARNI: Entresto 49/51mg bid   *MRA:  Spironolactone 12.5mg daily  *SGLT2-inhibitor: Empagloflizin 10mg daily   -Discharge with torsemide 100 mg daily for diuretic    Keep established outpatient follow-up with Dr. NEERAJ Panchal currently scheduled for March  No barriers to discharge     Peripheral IV 02/18/24 20 G Left Antecubital (Active)   Site Assessment Clean;Dry;Intact 02/20/24 0900   Dressing Status Clean;Dry 02/20/24 0900   Number of days: 2       Code Status:  Full Code    I spent 45 minutes in the professional and overall care of this patient.    Zackery Galdamez DO  [facility]  Division of cardiovascular medicine  [facility]

## 2024-02-21 NOTE — DISCHARGE SUMMARY
Discharge Diagnosis  Shortness of breath    Issues Requiring Follow-Up  Follow-up with PCP and pulmonary  Outpatient lung function studies    Test Results Pending At Discharge  Pending Labs       No current pending labs.            Hospital Course  Patient with a past medical history of hypertension, hyperlipidemia, COPD, CKD, CAD/inferior MI s/p 3 HELEN to RCA developing a VSD s/p CABG , VSD repair in 2015 and cardiomyopathy s/p SQ ICD placement in 2018. Echocardiogram 11/2023 showed an EF of 25-30% with eccentric LVH, mildly reduced RV systolic function and no significant valvular disease. Heart catheterization 9/2022 showed moderate LM disease similar to prior angiography, patent RCA stents to level of RPDA, subintimal stent in RPL occluded and patent SVG-LAD separate worsening shortness of breath over the last 3 days  He recently returned from [place] and since then has been getting progressively short of breath  Denies any associated chest pain or palpitations  Had an elevated D-dimer in the emergency room but CT PE was negative for blood clots  Also with elevated BNP consistent with congestive heart failure    Started the patient on IV diuresis  Also started the patient on Spiriva  Nocturnal pulse oximetry was done which was okay  The patient has responded well to treatment and breathes better  Will transition to oral Lasix and continue Spiriva  He is already scheduled for outpatient lung function studies  Discharge patient in stable condition    Discharge diagnosis  Acute systolic congestive heart failure  Exacerbation of COPD  Ischemic cardiomyopathy  Coronary artery disease    Pertinent Physical Exam At Time of Discharge  Physical Exam    Constitutional   General appearance: Alert and in no acute distress.     Pulmonary   Respiratory assessment: No respiratory distress, normal respiratory rhythm and effort.    Auscultation of Lungs: Clear bilateral breath sounds.   Cardiovascular   Auscultation of heart:  Apical pulse normal, heart rate and rhythm normal, normal S1 and S2, no murmurs and no pericardial rub.    Exam for edema: No peripheral edema.   Abdomen   Abdominal Exam: No bruits, normal bowel sounds, soft, non-tender, no abdominal mass palpated.    Liver and Spleen exam: No hepato-splenomegaly.   Musculoskeletal   Examination of gait: Normal.    Inspection of digits and nails: No clubbing or cyanosis of the fingernails.    Inspection/palpation of joints, bones and muscles: No joint swelling. Normal movement of all extremities.   Skin   Skin inspection: Normal skin color and pigmentation, normal skin turgor and no visible rash.   Neurologic   Cranial nerves: Nerves 2-12 were intact, no focal neuro defects.       Home Medications     Medication List      START taking these medications     Jardiance 10 mg; Generic drug: empagliflozin; Take 1 tablet (10 mg) by   mouth once daily. Do not start before February 21, 2024.   Spiriva Respimat 2.5 mcg/actuation inhaler; Generic drug: tiotropium;   Inhale 2 puffs once daily. Do not start before February 21, 2024.   spironolactone 25 mg tablet; Commonly known as: Aldactone; Take 0.5   tablets (12.5 mg) by mouth once daily. Do not start before February 21, 2024.   torsemide 100 mg tablet; Commonly known as: Demadex; Take 1 tablet (100   mg) by mouth once daily.     CONTINUE taking these medications     albuterol 90 mcg/actuation inhaler; Commonly known as: ProAir HFA;   Inhale 2 puffs every 4 hours if needed for wheezing or shortness of   breath.   aspirin 81 mg EC tablet   carvedilol 6.25 mg tablet; Commonly known as: Coreg; Take 1 tablet (6.25   mg) by mouth 2 times a day.   clopidogrel 75 mg tablet; Commonly known as: Plavix   diphenhydrAMINE 25 mg tablet; Commonly known as: Sominex   Entresto  mg tablet; Generic drug: sacubitriL-valsartan; Take 1   tablet by mouth 2 times a day.   MULTIPLE VITAMINS ORAL   rosuvastatin 40 mg tablet; Commonly known as: Crestor    tamsulosin 0.4 mg 24 hr capsule; Commonly known as: Flomax; Take 1   capsule (0.4 mg) by mouth once daily.       Outpatient Follow-Up  Future Appointments   Date Time Provider Department [place]   2/28/2024 10:30 AM [facility] [id number] CARD1 ROOM [id number] [facility]   3/5/2024 10:30 AM Tawny Carreno APRN-CNP [id number] [facility]   3/8/2024  4:15 PM Helio Sparks MD [id number] [facility]   3/21/2024  2:00 PM Helio Sparks MD [id number] [facility]     Patient seen at bedside. Events from the last visit reviewed. Discussed with staff. Results of tests and investigations from last visit reviewed and discussed with patient/Family. Electronic chart on [facility] reviewed. Input / Recommendations  from consultants  appreciated and reviewed and agreed with.     discharge summary and profile completed. medications reviewed and discussed with patient and family.  scripts completed and signed.     total discharge time in excess of 30 minutes.    Gisell Flores MD

## 2024-02-21 NOTE — PROGRESS NOTES
Wojciech Kern was referred to the Clinical Pharmacy Team to complete a virtual Transitions of Care encounter for discharge medication optimization. The patient was referred for their HFrEF and COPD. The primary goal of this encounter is to ensure the patient's medications are both clinically appropriate and financially feasible for the patient. Pharmacy clinical interventions were provided as noted below.   _______________________________________________________________________  PHARMACY ASSESSMENT    Home [facility]acy: [facility] (#[id number] [place], [place])   Meds to beds? Yes. Please send any discharge medications to  [facility] and they will be delivered to the bedside prior to discharge.    Allergies Reviewed? Yes    No issues reported in regards to accessibility, affordability, adherence, adverse effects, or organization.     Medications Prior to Admission   Medication Sig Dispense Refill Last Dose    albuterol (ProAir HFA) 90 mcg/actuation inhaler Inhale 2 puffs every 4 hours if needed for wheezing or shortness of breath. 8.5 g 0 2/18/2024 at 0900    aspirin 81 mg EC tablet Take 1 tablet (81 mg) by mouth once daily.     2/18/2024 at 0900    carvedilol (Coreg) 6.25 mg tablet Take 1 tablet (6.25 mg) by mouth 2 times a day. 180 tablet 0 2/18/2024 at 0900    clopidogrel (Plavix) 75 mg tablet Take 1 tablet (75 mg) by mouth once daily.     2/18/2024 at 0900    diphenhydrAMINE (Sominex) 25 mg tablet Take 1 tablet (25 mg) by mouth as needed at bedtime for sleep.     2/18/2024 at 2200    Entresto  mg tablet Take 1 tablet by mouth 2 times a day. 60 tablet 0 2/18/2024 at 0900    multivitamin (MULTIPLE VITAMINS ORAL) Take 1 tablet by mouth once daily.     2/18/2024 at 0900    rosuvastatin (Crestor) 40 mg tablet Take 1 tablet (40 mg) by mouth once daily.     2/18/2024 at 0900    tamsulosin (Flomax) 0.4 mg 24 hr capsule Take 1 capsule (0.4 mg) by mouth once daily. 90 capsule 1 2/18/2024 at 0900    _______________________________________________________________________  ASTHMA/COPD ASSESSMENT    CURRENT PHARMACOTHERAPY  - albuterol 90 mcg/act 2 puffs Q 4 hrs prn     HISTORICAL PHARMACOTHERAPY  - none    RESCUE INHALER USE  - Rarely uses (1-2 times per week)    SECONDARY PREVENTION  - Influenza: 2023  - PPSV23: 2015, 2022    EXACERBATION HISTORY  - When was your last hospitalization for an exacerbation? 2023  - When was the last time you were treated with antibiotics and/or steroids? Unknown    Pulmonary Function Tests: 2023    FEV1/FVC: 75  post FEV1: 70%   T.79 (128%)   RV/T (150%)   DLCO: 52% pred     DLCO corrected for alveolar volume: 74% pred    SMOKING CESSATION  Patient is not currently using tobacco products  - Quit Date:   - Years Smokin ppd for 20 years   _____________________________________________________________________  CHRONIC HEART FAILURE ASSESSMENT  -HTN present/diagnosed: Yes    LVEF: 25-30%, ACC/AHA class C  eGFR: 46 mL/min/1.73m* (2024)   Beta blocker: carvedilol 6.25 mg BID   ACEi/ARB: Entresto 97-103mg BID   MRA: none  SGLT2i: none   ___________________________________________________________________  PATIENT EDUCATION/GOALS  -Educated patient on proper inhaler technique and proper inhaler cleaning  -Counseled patient on MOA, expectations, side effects, duration of therapy, contraindications, administration, and monitoring parameters for new medications   -Patient has pill cutter at home for spironolactone dose   _______________________________________________________________________  PLAN  New Medications  Start Jardiance 10mg - daily for HF (delivered M2B)  Start Spiriva respimat 2.5mcg/puff - inhale 2 puffs once daily for COPD (delivered M2B)  Start Spironolactone 25mg - half tablet (12.5 mg) daily for HF (delivered M2B)  Meds to beds medication delivered to bedside   Free voucher used for Spiriva inhaler (normal copay through  insurance would be $47 for 30 day supply)   Jardiance copay $47 for 30 day supply   Spironolactone copay $0 for 30 day supply   Continuation of care will be provided by the outpatient clinical pharmacy team in collaboration with the patient's  Primary Care Provider - Dr. Hleio Sparks.     Verbal consent to manage patient's drug therapy was obtained from the patient. They were informed they may decline to participate or withdraw from participation in pharmacy services at any time.

## 2024-02-22 ENCOUNTER — PATIENT OUTREACH (OUTPATIENT)
Dept: HOME HEALTH SERVICES | Age: 74
End: 2024-02-22
Payer: MEDICARE

## 2024-02-22 SDOH — SOCIAL STABILITY: SOCIAL NETWORK: HOW OFTEN DO YOU GET TOGETHER WITH FRIENDS OR RELATIVES?: ONCE A WEEK

## 2024-02-22 SDOH — ECONOMIC STABILITY: INCOME INSECURITY: IN THE PAST 12 MONTHS, HAS THE ELECTRIC, GAS, OIL, OR WATER COMPANY THREATENED TO SHUT OFF SERVICE IN YOUR HOME?: NO

## 2024-02-22 SDOH — HEALTH STABILITY: MENTAL HEALTH: HOW MANY STANDARD DRINKS CONTAINING ALCOHOL DO YOU HAVE ON A TYPICAL DAY?: 1 OR 2

## 2024-02-22 SDOH — ECONOMIC STABILITY: TRANSPORTATION INSECURITY
IN THE PAST 12 MONTHS, HAS LACK OF TRANSPORTATION KEPT YOU FROM MEETINGS, WORK, OR FROM GETTING THINGS NEEDED FOR DAILY LIVING?: NO

## 2024-02-22 SDOH — ECONOMIC STABILITY: HOUSING INSECURITY
IN THE LAST 12 MONTHS, WAS THERE A TIME WHEN YOU DID NOT HAVE A STEADY PLACE TO SLEEP OR SLEPT IN A SHELTER (INCLUDING NOW)?: NO

## 2024-02-22 SDOH — ECONOMIC STABILITY: INCOME INSECURITY: IN THE LAST 12 MONTHS, WAS THERE A TIME WHEN YOU WERE NOT ABLE TO PAY THE MORTGAGE OR RENT ON TIME?: NO

## 2024-02-22 SDOH — HEALTH STABILITY: MENTAL HEALTH: HOW OFTEN DO YOU HAVE 6 OR MORE DRINKS ON ONE OCCASION?: NEVER

## 2024-02-22 SDOH — ECONOMIC STABILITY: FOOD INSECURITY: WITHIN THE PAST 12 MONTHS, YOU WORRIED THAT YOUR FOOD WOULD RUN OUT BEFORE YOU GOT MONEY TO BUY MORE.: NEVER TRUE

## 2024-02-22 SDOH — ECONOMIC STABILITY: FOOD INSECURITY: WITHIN THE PAST 12 MONTHS, THE FOOD YOU BOUGHT JUST DIDN'T LAST AND YOU DIDN'T HAVE MONEY TO GET MORE.: NEVER TRUE

## 2024-02-22 SDOH — SOCIAL STABILITY: SOCIAL INSECURITY: WITHIN THE LAST YEAR, HAVE YOU BEEN HUMILIATED OR EMOTIONALLY ABUSED IN OTHER WAYS BY YOUR PARTNER OR EX-PARTNER?: NO

## 2024-02-22 SDOH — HEALTH STABILITY: MENTAL HEALTH: HOW OFTEN DO YOU HAVE A DRINK CONTAINING ALCOHOL?: 2-4 TIMES A MONTH

## 2024-02-22 SDOH — ECONOMIC STABILITY: INCOME INSECURITY: HOW HARD IS IT FOR YOU TO PAY FOR THE VERY BASICS LIKE FOOD, HOUSING, MEDICAL CARE, AND HEATING?: NOT VERY HARD

## 2024-02-22 SDOH — SOCIAL STABILITY: SOCIAL NETWORK: ARE YOU MARRIED, WIDOWED, DIVORCED, SEPARATED, NEVER MARRIED, OR LIVING WITH A PARTNER?: MARRIED

## 2024-02-22 SDOH — HEALTH STABILITY: PHYSICAL HEALTH: ON AVERAGE, HOW MANY MINUTES DO YOU ENGAGE IN EXERCISE AT THIS LEVEL?: 30 MIN

## 2024-02-22 SDOH — SOCIAL STABILITY: SOCIAL NETWORK: HOW OFTEN DO YOU ATTEND CHURCH OR RELIGIOUS SERVICES?: NEVER

## 2024-02-22 SDOH — SOCIAL STABILITY: SOCIAL INSECURITY: WITHIN THE LAST YEAR, HAVE YOU BEEN AFRAID OF YOUR PARTNER OR EX-PARTNER?: NO

## 2024-02-22 SDOH — ECONOMIC STABILITY: TRANSPORTATION INSECURITY
IN THE PAST 12 MONTHS, HAS THE LACK OF TRANSPORTATION KEPT YOU FROM MEDICAL APPOINTMENTS OR FROM GETTING MEDICATIONS?: NO

## 2024-02-22 SDOH — SOCIAL STABILITY: SOCIAL NETWORK: HOW OFTEN DO YOU ATTENT MEETINGS OF THE CLUB OR ORGANIZATION YOU BELONG TO?: 1 TO 4 TIMES PER YEAR

## 2024-02-22 SDOH — HEALTH STABILITY: PHYSICAL HEALTH: ON AVERAGE, HOW MANY DAYS PER WEEK DO YOU ENGAGE IN MODERATE TO STRENUOUS EXERCISE (LIKE A BRISK WALK)?: 7 DAYS

## 2024-02-22 SDOH — ECONOMIC STABILITY: HOUSING INSECURITY: IN THE LAST 12 MONTHS, HOW MANY PLACES HAVE YOU LIVED?: 1

## 2024-02-22 ASSESSMENT — LIFESTYLE VARIABLES
AUDIT-C TOTAL SCORE: 2
SKIP TO QUESTIONS 9-10: 1

## 2024-02-22 NOTE — PROGRESS NOTES
Pt enrolled. Interested in PAP. Declined daily calls, started MWF ~1130. CHF/COPD in need of assistance with entresto and jardiance. [facility] set for 2/23 at 1700.     Patient's Address:   [address] [place] [id number]  **  If this is not the address patient will receive services - alert team and address in EMR**       Patient Contacts:  Extended Emergency Contact Information  Primary Emergency Contact: Imelda Kern  Home Phone: [phone]  Work Phone: [phone]  Relation: Spouse                                Patient's Preferred Phone: [phone]  Patient's E-mail: [email or web address]

## 2024-02-23 ENCOUNTER — PATIENT OUTREACH (OUTPATIENT)
Dept: CARE COORDINATION | Age: 74
End: 2024-02-23

## 2024-02-23 ENCOUNTER — PATIENT OUTREACH (OUTPATIENT)
Dept: CARE COORDINATION | Facility: CLINIC | Age: 74
End: 2024-02-23

## 2024-02-23 VITALS — BODY MASS INDEX: 22.66 KG/M2 | WEIGHT: 153 LBS | OXYGEN SATURATION: 94 %

## 2024-02-23 NOTE — PROGRESS NOTES
Discharge Facility: [facility]  Discharge Diagnosis: acute systolic CHF, exacerbation of COPD  Admission Date: 2/19/24  Discharge Date: 2/21/24    PCP Appointment Date: 3/8/24, appointment is outside of 14 day window, message sent to office  Specialist Appointment Date: needs pulmonology, cardiology 2/28/24, enrolled in [facility]  Hospital Encounter and Summary: Linked  Unsuccessful attempts to reach patient x2.

## 2024-02-23 NOTE — PROGRESS NOTES
Daily Call Note:   Initial [facility] call, pt instructed to log daily weights and bp readings moving forward. Denies any swelling in arms and legs, denies sob since home from hospital and has inhalers as ordered.   Medications reviewed. PAP discussed. Pt to email in forms in to [facility].   Patient given follow information to call and schedule Pulmonology appt.   Pt states prior to admission, he would get sob at night, states they did nocturnal pox at hospital and he passed. He has Pulse ox at home to check pulse ox when needed.   But has been sleeping much better since he's been home.   Next [facility] scheduled.     Pt Education: Daily weights and bp logging   Barriers: na  Topics for Daily Review: sob, swelling  Pt demonstrates clear understanding: Yes    Daily Weight:  Vitals:    02/23/24 1705   Weight: 69.4 kg (153 lb)      Last 3 Weights:  Wt Readings from Last 7 Encounters:   02/23/24 69.4 kg (153 lb)   02/19/24 73 kg (160 lb 15 oz)   12/01/23 73 kg (161 lb)   11/20/23 69.4 kg (152 lb 14.4 oz)   10/25/23 71.4 kg (157 lb 5 oz)   09/15/23 73.5 kg (162 lb)   08/31/23 71.2 kg (157 lb)       Masimo Device: No   Masimo Clinical Impression: na    Virtual Visits--Scheduled (Most Recent Date at Top)  Follow up Appointments  Recent Visits  No visits were found meeting these conditions.  Showing recent visits within past 30 days and meeting all other requirements  Today's Visits  Date Type Provider Dept   02/23/24 Appointment [facility]   Showing today's visits and meeting all other requirements  Future Appointments  Date Type Provider Dept   03/08/24 Appointment Helio Sparks MD Do [id number]   03/21/24 Appointment Helio Sparks MD Do [id number] [facility]   Showing future appointments within next 90 days and meeting all other requirements       Frequency of RN Calls & Virtual Visits per Team Agreement: [facility] Frequency: Bi-Weekly    Medication issues Addressed (what was  done): na    Follow up appointments scheduled by [facility] Staff: fletcher  Referrals made by [facility] staff: na      Acute [facility] Transitions Assessment    Patient's Address:   [address] [place] [id number]  **  If this is not the address patient will receive services - alert team and address in EMR**       Patient Contacts:  Extended Emergency Contact Information  Primary Emergency Contact: Imelda Kern  Home Phone: [phone]  Work Phone: [phone]  Relation: Spouse                                Patient's Preferred Phone: [phone]  Patient's E-mail: [email or web address]                                       Detail Level: Detailed

## 2024-02-27 ENCOUNTER — APPOINTMENT (OUTPATIENT)
Dept: PRIMARY CARE | Facility: CLINIC | Age: 74
End: 2024-02-27
Payer: MEDICARE

## 2024-02-28 ENCOUNTER — OFFICE VISIT (OUTPATIENT)
Dept: CARDIOLOGY | Facility: CLINIC | Age: 74
End: 2024-02-28
Payer: MEDICARE

## 2024-02-28 ENCOUNTER — PATIENT OUTREACH (OUTPATIENT)
Dept: HOME HEALTH SERVICES | Age: 74
End: 2024-02-28

## 2024-02-28 VITALS
OXYGEN SATURATION: 96 % | HEART RATE: 69 BPM | DIASTOLIC BLOOD PRESSURE: 65 MMHG | BODY MASS INDEX: 22.73 KG/M2 | WEIGHT: 150 LBS | SYSTOLIC BLOOD PRESSURE: 108 MMHG | HEIGHT: 68 IN | TEMPERATURE: 97.3 F

## 2024-02-28 DIAGNOSIS — I50.20 HFREF (HEART FAILURE WITH REDUCED EJECTION FRACTION) (MULTI): Primary | Chronic | ICD-10-CM

## 2024-02-28 PROCEDURE — 1111F DSCHRG MED/CURRENT MED MERGE: CPT | Performed by: NURSE PRACTITIONER

## 2024-02-28 PROCEDURE — 1159F MED LIST DOCD IN RCRD: CPT | Performed by: NURSE PRACTITIONER

## 2024-02-28 PROCEDURE — 1036F TOBACCO NON-USER: CPT | Performed by: NURSE PRACTITIONER

## 2024-02-28 PROCEDURE — 99214 OFFICE O/P EST MOD 30 MIN: CPT | Performed by: NURSE PRACTITIONER

## 2024-02-28 PROCEDURE — 99214 OFFICE O/P EST MOD 30 MIN: CPT | Mod: ZK | Performed by: NURSE PRACTITIONER

## 2024-02-28 PROCEDURE — 3078F DIAST BP <80 MM HG: CPT | Performed by: NURSE PRACTITIONER

## 2024-02-28 PROCEDURE — 1126F AMNT PAIN NOTED NONE PRSNT: CPT | Performed by: NURSE PRACTITIONER

## 2024-02-28 PROCEDURE — 3074F SYST BP LT 130 MM HG: CPT | Performed by: NURSE PRACTITIONER

## 2024-02-28 PROCEDURE — 1157F ADVNC CARE PLAN IN RCRD: CPT | Performed by: NURSE PRACTITIONER

## 2024-02-28 NOTE — PROGRESS NOTES
Incoming call from patient, he states he would prefer Friday only calls and wants to know if any update on PAP, notified none at this time. [facility], verbalized understanding.     Patient's Address:   [address]  [place] [address]  **  If this is not the address patient will receive services - alert team and address in EMR**       Patient Contacts:  Extended Emergency Contact Information  Primary Emergency Contact: Imelda Kern  Home Phone: [phone]  Work Phone: [phone]  Relation: Spouse                                Patient's Preferred Phone: [phone]  Patient's E-mail: [email or web address]

## 2024-02-28 NOTE — PROGRESS NOTES
"Primary Care Physician: Helio Sparks MD  Date of Visit: 02/28/2024 10:30 AM EST  Location of visit: [facility]     Chief Complaint:   No chief complaint on file.       HPI / Summary:   Wojciech Kern is a 73 y.o. male with past medical history of  longstanding ICM/CAD (inferior MI s/p 3 HELEN to RCA that was c/b guidewire dissection requiring 4th HELEN, developing a VSD s/p CABG x 1 (SVG-LAD) and VSD repair in 2015) and SQ ICD placement in 2018, hypertension, hyperlipidemia, COPD, CKD III who presents to heart failure clinic today for hospital follow up.     He was admitted to [facility] from 2/18/2024 - 2/21/2024 with acute on chronic diastolic heart failure, COPD exacerbation.  Started on IV diuresis and discharged on torsemide 100mg daily.      Since discharge, feeling week, less energy.  Not sleeping well, but this seems to be improving.  Shortness of breath is much better.  Still sleeping propped with two pillows.  Never swelling in arms arms or legs.  No palpitations, no headaches.  No dizziness, no fear of passing out.  Balance has been off.  Denies any chest pain or angina. No nausea, vomiting, fever, or chills.  NO cough.      Home BP running \"low\"  116/70, with HR running 70.  Lost about 8lbs in the past week, although appetite is good    Last Cardiology Tests:  ECG:      Echo:  TTE 10/25/2023  CONCLUSIONS:   1. Left ventricular systolic function is severely decreased with a 25-30% estimated ejection fraction.   2. There is global hypokinesis of the left ventricle with minor regional variations.   3. The inferior and infero lateral wall are the most hypokinetic. 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 There is a 1.4 x 1.4 cm echodensity in the submitral apparatus (clip 110). DDx includes calcified papillary muscle (favored) v less likely thrombus.   4. There is moderate, functional mitral valve regurgitation which is eccentrically directed. The degree of mitral regurgitation may " be underestimated due to the eccentricity of the jet.   5. There is mildly reduced right ventricular systolic function.   6. The left atrium is severely dilated.   7. RVSP within normal limits.   8. Compared with study from 9/10/2022, there is now at least moderate functional mitral regurgitation, which may be underestimated due to the eccentricity of the jet. A bright echodensity in the submitral apparatus is seen in some views more easily on the current study, though it also appears in certain clips on the prior study (clip 39) and favored to be a calcified papillary muscle. Comparison of the size of the LV aneurysm is challenging due to technical limitations.    Cath:      Stress Test:      Cardiac Imaging:    Past Medical History:  Past Medical History:   Diagnosis Date    AICD (automatic cardioverter/defibrillator) present 04/06/2023    Atherosclerotic heart disease of native coronary artery without angina pectoris 10/28/2015    CHF (congestive heart failure) (CMS/[facility])     Chronic kidney disease, stage 3 unspecified (CMS/[facility]) 12/19/2019    Community acquired pneumonia 04/06/2023    Cortical age-related cataract, left eye 03/31/2016    Dysphagia, unspecified 08/20/2015    Dysuria 04/06/2023    Emphysema/COPD (CMS/[facility]) 04/06/2023    Essential (primary) hypertension 02/26/2018    Gastric ulcer 04/06/2023    -H pylori     Gastritis 04/06/2023    Hemoptysis 05/17/2016    HFrEF (heart failure with reduced ejection fraction) (CMS/[facility]) 04/06/2023    Kidney stone on left side 04/06/2023    Melena 04/06/2023    NUD (nonulcer dyspepsia) 04/06/2023    Personal history of peptic ulcer disease 05/23/2019    Personal history of pneumonia (recurrent) 03/07/2016    Prurigo nodularis 04/06/2023    Urinary retention 04/06/2023        Past Surgical History:  Past Surgical History:   Procedure Laterality Date    CATARACT EXTRACTION Bilateral 03/26/2016    Cataract Surgery    CORONARY ARTERY BYPASS GRAFT  02/26/2018    CABG           Social History:  He reports that he quit smoking about 26 years ago. His smoking use included cigarettes. He has never used smokeless tobacco. He reports that he does not currently use alcohol. He reports that he does not currently use drugs.    Family History:  family history includes Coronary artery disease in his mother; Hypertension in his father.      Allergies:  Allergies   Allergen Reactions    Penicillins Anaphylaxis and Hives    Sulfa (Sulfonamide Antibiotics) Hives       Outpatient Medications:  Current Outpatient Medications   Medication Instructions    albuterol (ProAir HFA) 90 mcg/actuation inhaler 2 puffs, inhalation, Every 4 hours PRN    aspirin 81 mg EC tablet 1 tablet, oral, Daily    carvedilol (COREG) 6.25 mg, oral, 2 times daily    clopidogrel (PLAVIX) 75 mg, oral, Daily    empagliflozin (Jardiance) 10 mg Take 1 tablet (10 mg) by mouth once daily. Do not start before February 21, 2024.    Entresto  mg tablet 1 tablet, oral, 2 times daily    multivitamin (MULTIPLE VITAMINS ORAL) 1 tablet, oral, Daily    rosuvastatin (Crestor) 40 mg tablet 1 tablet, oral, Daily    spironolactone (Aldactone) 25 mg tablet Take 0.5 tablets (12.5 mg) by mouth once daily. Do not start before February 21, 2024.    tamsulosin (FLOMAX) 0.4 mg, oral, Daily    tiotropium (Spiriva Respimat) 2.5 mcg/actuation inhaler Inhale 2 puffs once daily. Do not start before February 21, 2024.    torsemide (DEMADEX) 100 mg, oral, Daily       Physical Exam:      General:  Patient is awake, alert, and oriented.  Patient is in no acute distress.  HEENT:  Normocephalic.  Moist mucosa.    Neck:  Normal Jugular Venous Pressure.  Cardiovascular:  Regular rate and rhythm.  Normal S1 and S2, no murmurs, rubs or gallops  Pulmonary:  Clear to auscultation bilaterally.  Abdomen:  Soft. Non-tender.   Non-distended.  Positive bowel sounds.  Lower Extremities:  2+ pedal pulses. No LE edema.  Neurologic:  Alert and oriented x3. No focal deficit.  "  Skin: Skin warm and dry, normal skin turgor.   Psychiatric: Normal affect.      Vitals:    02/28/24 1018   BP: 108/65   Pulse: 69   Temp: 36.3 °C (97.3 °F)   TempSrc: Temporal   SpO2: 96%   Weight: 68 kg (150 lb)   Height: 1.727 m (5' 8\")     Wt Readings from Last 5 Encounters:   02/28/24 68 kg (150 lb)   02/23/24 69.4 kg (153 lb)   02/19/24 73 kg (160 lb 15 oz)   12/01/23 73 kg (161 lb)   11/20/23 69.4 kg (152 lb 14.4 oz)     Body mass index is 22.81 kg/m².        Last Labs:  CMP:  Recent Labs     02/21/24  0559 02/20/24  0530 02/19/24  0801    139 137   K 3.4* 3.9 4.1    102 105   CO2 28 29 25   ANIONGAP 11 12 11   BUN 34* 34* 28*   CREATININE 1.55* 1.84* 1.51*   EGFR 46* 38* 48*   GLUCOSE 91 98 135*     Recent Labs     02/19/24  0801 02/18/24  1755 08/20/23  2251 12/21/18  0930 08/16/18  1112   ALBUMIN 3.7 4.4 3.7   < > 4.2   ALKPHOS 45 57 53   < > 64   ALT 11 15 14   < > 29   AST 14 19 17   < > 22   BILITOT 0.7 0.9 0.6   < > 0.6   LIPASE  --   --   --   --  69    < > = values in this interval not displayed.     CBC:  Recent Labs     02/21/24  0559 02/20/24  0529 02/19/24  0801   WBC 7.0 7.8 3.7*   HGB 11.4* 10.3* 10.0*   HCT 34.3* 31.2* 30.6*    193 167   MCV 94 96 97     COAG:   Recent Labs     02/18/24  1755 08/20/23  2251 11/21/22  0622 09/10/22  1037 08/16/18  1112   INR  --  1.2* 1.2* 1.1 1.1   DDIMERVTE 22,606*  --   --   --  7,217*     ENDO:  Recent Labs     06/17/22  0900 02/22/21  1013 12/26/19  0938 12/21/18  0930   TSH 1.32 1.16 1.18 1.42      CARDIAC:   Recent Labs     02/21/24  0559 02/18/24 2004 02/18/24  1755 08/20/23  2251   TROPHS  --  23* 26* 13   BNP 1,257*  --  >4,700* 2,726*     Recent Labs     06/21/23  0916 06/17/22  0900 09/03/21  1007   CHOL 107 101 103   LDLF 52 46 51   HDL 46.0 44.8 42.1   TRIG 43 52 51     No data recorded    Wojciech Kern is a 73 y.o. male with past medical history of  longstanding ICM/CAD (inferior MI s/p 3 HELEN to RCA that was c/b guidewire " dissection requiring 4th HELEN, developing a VSD s/p CABG x 1 (SVG-LAD) and VSD repair in 2015) and SQ ICD placement in 2018, hypertension, hyperlipidemia, COPD, CKD III who presents to heart failure clinic today for hospital follow up.     He was admitted to [facility] from 2/18/2024 - 2/21/2024 with acute on chronic diastolic heart failure, COPD exacerbation.  Started on IV diuresis and discharged on torsemide 100mg daily.      Overall doing well, but energy is still low.  HR and BP well controlled. Appers euvolemic.     Plan:  - Provided with BP / HR / Weight log  -Current GDMT:  *BB: Coreg 6.25mg BID - He has been holding coreg since discharge.  Prefers to hold off restarting now, and will take BP / HR log.  May need to stop Coreg and start lower dose Toprol if BP remains soft.   *ACE/ARB/ARNI: Entresto 49/51mg bid   *MRA:  Spironolactone 12.5mg daily  *SGLT2-inhibitor: Empagloflizin 10mg daily   -Continue torsemide 100 mg daily for diuretic  Following with [facility] program, recently provided financial information for hopeful reduction of cost of medications.     - Renal function panel prior to follow up visit scheduled with Tawny Carreno CNP on 3/5/2024      Followup Appts:  Future Appointments   Date Time Provider Department Center   3/1/2024  5:00 PM [id number] None   3/1/2024  5:15 PM Sami HughesD [id number] [facility]   3/5/2024 10:30 AM SHANNON Bray-CNP [id number] [facility]   3/11/2024  4:20 PM DO NELLA [id number] [facility]   3/21/2024  2:00 PM Helio Sparks MD [id number] [facility]       ____________________________________________________________  Zehra Wilkins CNP  [facility]  [facility] - [facility]

## 2024-03-01 ENCOUNTER — PATIENT OUTREACH (OUTPATIENT)
Dept: HOME HEALTH SERVICES | Age: 74
End: 2024-03-01

## 2024-03-01 ENCOUNTER — TELEMEDICINE (OUTPATIENT)
Dept: PHARMACY | Facility: HOSPITAL | Age: 74
End: 2024-03-01
Payer: MEDICARE

## 2024-03-01 VITALS
BODY MASS INDEX: 22.81 KG/M2 | SYSTOLIC BLOOD PRESSURE: 113 MMHG | WEIGHT: 150 LBS | DIASTOLIC BLOOD PRESSURE: 57 MMHG | HEART RATE: 64 BPM

## 2024-03-01 DIAGNOSIS — J44.1 COPD EXACERBATION (MULTI): ICD-10-CM

## 2024-03-01 DIAGNOSIS — I21.4 NON-ST ELEVATION MYOCARDIAL INFARCTION (NSTEMI) IN RECOVERY PHASE (MULTI): ICD-10-CM

## 2024-03-01 DIAGNOSIS — I10 PRIMARY HYPERTENSION: ICD-10-CM

## 2024-03-01 DIAGNOSIS — I50.9 CONGESTIVE HEART FAILURE, UNSPECIFIED HF CHRONICITY, UNSPECIFIED HEART FAILURE TYPE (MULTI): ICD-10-CM

## 2024-03-01 DIAGNOSIS — J43.9 PULMONARY EMPHYSEMA, UNSPECIFIED EMPHYSEMA TYPE (MULTI): Chronic | ICD-10-CM

## 2024-03-01 DIAGNOSIS — I50.20 HFREF (HEART FAILURE WITH REDUCED EJECTION FRACTION) (MULTI): Chronic | ICD-10-CM

## 2024-03-01 RX ORDER — SACUBITRIL AND VALSARTAN 97; 103 MG/1; MG/1
1 TABLET, FILM COATED ORAL 2 TIMES DAILY
Qty: 180 TABLET | Refills: 0 | Status: SHIPPED | OUTPATIENT
Start: 2024-03-01

## 2024-03-01 NOTE — PROGRESS NOTES
Pharmacy Post-Discharge Visit  Wojciech Kern is a 73 y.o. male was referred to Clinical Pharmacy Team to complete a post-discharge medication optimization and monitoring visit.  The patient was referred for their COPD and heart failure management. He is also currently enrolled in our [facility].     Admission Date: 2/18/24  Discharge Date: 2/21/24    Referring Provider: SHANNON Ellis-CNP, DNP   PCP: Helio Sparks MD    Subjective   Allergies   Allergen Reactions    Penicillins Anaphylaxis and Hives    Sulfa (Sulfonamide Antibiotics) [id number] [place] - [address] AT [facility]  [address]  [facility] [address]  Phone: [phone] Fax: [phone]      Social History     Social History Narrative    Not on file        Notable Medication changes following discharge:  Start: jardiance, spiriva, spironolactone and torsemide  Stop: none  Change: none    HPI  CHF Assessment    Symptom/Staging:  -Most recent ejection fraction: 25-30%  -NYHA Stage: n/a  -ABCD Stage: C  -Weight changes?: Yes, describe: down 10 lbs since discharge     Guideline-Directed Medical Therapy:  -ARNI: Yes, describe: Entresto   -If no, then ACEi/ARB?: No  -Beta Blocker: Yes, describe: Carvedilol  -MRA: Yes, describe: Spironolactone  -SGLT2i: Yes, describe: Jardiance    Secondary Prevention:  -The ASCVD Risk score (Arsenio DK, et al., 2019) failed to calculate for the following reasons:    The patient has a prior MI or stroke diagnosis   -Aspirin 81mg? yes  -Statin?: Yes, describe: Rosuvastatin  -HTN?: No     COPD ASSESSMENT    Rescue Inhaler Use:  -How many times per week do you use your rescue inhale? Has not had to use since being home     Inhaler Technique:  -How do you use your rescue inhaler?  --as needed    -How do you use your maintenance inhaler?  --2 puffs daily     Secondary Prevention (vaccines):  -Influenza: Date [n/a]  -PCV13:  "Date [n/a]  -PPSV23: Date [n/a]    Sx Management:  -Increased cough? no  -Increased sputum production? no  -Increased SOB? no    Exacerbation Hx:  -When was your last hospitalization for an exacerbation? More than a year ago  -When was the last time you were treated with antibiotics and/or steroids? Unknown      Review of Systems    Medication System Management:  Affordability/Accessibility: Enroll into PAP for entresto, spiriva and jardiance   Adherence/Organization: no issues   Adverse Effects: none    Objective     There were no vitals taken for this visit.     LAB  Lab Results   Component Value Date    BILITOT 0.7 02/19/2024    CALCIUM 8.6 02/21/2024    CO2 28 02/21/2024     02/21/2024    CREATININE 1.55 (H) 02/21/2024    GLUCOSE 91 02/21/2024    ALKPHOS 45 02/19/2024    K 3.4 (L) 02/21/2024    PROT 6.8 02/19/2024     02/21/2024    AST 14 02/19/2024    ALT 11 02/19/2024    BUN 34 (H) 02/21/2024    ANIONGAP 11 02/21/2024    MG 2.00 02/19/2024    PHOS 3.3 11/22/2022    ALBUMIN 3.7 02/19/2024    LIPASE 69 08/16/2018    GFRF CANCELED 08/25/2023    GFRMALE CANCELED 08/25/2023     Lab Results   Component Value Date    TRIG 43 06/21/2023    CHOL 107 06/21/2023    HDL 46.0 06/21/2023     No results found for: \"HGBA1C\"      Current Outpatient Medications on File Prior to Visit   Medication Sig Dispense Refill    albuterol (ProAir HFA) 90 mcg/actuation inhaler Inhale 2 puffs every 4 hours if needed for wheezing or shortness of breath. 8.5 g 0    aspirin 81 mg EC tablet Take 1 tablet (81 mg) by mouth once daily.      carvedilol (Coreg) 6.25 mg tablet Take 1 tablet (6.25 mg) by mouth 2 times a day. 180 tablet 0    clopidogrel (Plavix) 75 mg tablet Take 1 tablet (75 mg) by mouth once daily.      empagliflozin (Jardiance) 10 mg Take 1 tablet (10 mg) by mouth once daily. Do not start before February 21, 2024. 30 tablet 0    Entresto  mg tablet Take 1 tablet by mouth 2 times a day. 60 tablet 0    multivitamin " (MULTIPLE VITAMINS ORAL) Take 1 tablet by mouth once daily.      rosuvastatin (Crestor) 40 mg tablet Take 1 tablet (40 mg) by mouth once daily.      spironolactone (Aldactone) 25 mg tablet Take 0.5 tablets (12.5 mg) by mouth once daily. Do not start before February 21, 2024. 15 tablet 0    tamsulosin (Flomax) 0.4 mg 24 hr capsule Take 1 capsule (0.4 mg) by mouth once daily. 90 capsule 1    tiotropium (Spiriva Respimat) 2.5 mcg/actuation inhaler Inhale 2 puffs once daily. Do not start before February 21, 2024. 8 g 0    torsemide (Demadex) 100 mg tablet Take 1 tablet (100 mg) by mouth once daily. 30 tablet 0     No current facility-administered medications on file prior to visit.        HISTORICAL PHARMACOTHERAPY  -GDMT --> was just on entresto and coreg before admission    DRUG INTERACTIONS  - none    Assessment/Plan   Problem List Items Addressed This Visit       Emphysema/COPD (CMS/[facility]) (Chronic)    HFrEF (heart failure with reduced ejection fraction) (CMS/[facility]) (Chronic)    Non-ST elevation myocardial infarction (NSTEMI) in recovery phase (CMS/[facility])   CHF  Continue GDMT --> carvedilol, jardiance, entresto and spironolactone  Continue torsemide  Continue daily weights and BP checks   BP today 113/67  COPD  Continue spiriva   Albuterol as needed   Has pulse ox to check oxygen if ever feeling SOB     Medications to enroll into PAP:  Jardiance 10mg daily   Entresto 49-51mg twice daily   Spiriva 2.5mcg 2 puffs daily   -still files taxes jointly with wife   -went over documents to send me to submit for enrollment     Will follow up next week     Continue all meds under the continuation of care with the referring provider and clinical pharmacy team.    Alexander Spaulding, Bill     Verbal consent to manage patient's drug therapy was obtained from [the patient and/or an individual authorized to act on behalf of a patient]. They were informed they may decline to participate or withdraw from participation in pharmacy services at  any time.

## 2024-03-01 NOTE — PROGRESS NOTES
[facility] and Alexander Spaulding, PharmD. Patient reports he is doing ok, denies LE swelling and SOB. He is concerned mostly about medications that he can't afford, including Entresto, Jardiance and Spiriva. Alexander states that she did receive the paperwork that his wife sent in and she submitted it and just waiting on approval. He is also concerned about not gaining any weight even though he is eating healthy meals from [email or web address]. Instructed patient to continue daily weights and call us if he has a weight loss of 5 lbs or more over the next week, verbalized understanding. No medication changes at this time. Scheduled next [facility] on 3/8/24 at 1730, verbalized understanding. No other questions or concerns at this time.    Pt Education:   Barriers: none  Topics for Daily Review:   Pt demonstrates clear understanding: Yes    Daily Weight:  There were no vitals filed for this visit.   Last 3 Weights:  Wt Readings from Last 7 Encounters:   02/28/24 68 kg (150 lb)   02/23/24 69.4 kg (153 lb)   02/19/24 73 kg (160 lb 15 oz)   12/01/23 73 kg (161 lb)   11/20/23 69.4 kg (152 lb 14.4 oz)   10/25/23 71.4 kg (157 lb 5 oz)   09/15/23 73.5 kg (162 lb)       Masimo Device: No   Masimo Clinical Impression: N/A    Virtual Visits--Scheduled (Most Recent Date at Top)  Follow up Appointments  Recent Visits  No visits were found meeting these conditions.  Showing recent visits within past 30 days and meeting all other requirements  Future Appointments  Date Type Provider Dept   03/21/24 Appointment Helio Sparks MD Do [id number]   Showing future appointments within next 90 days and meeting all other requirements       Frequency of RN Calls & Virtual Visits per Team Agreement: [facility] Frequency: FRI    Medication issues Addressed (what was done): see note    Follow up appointments scheduled by [facility] Staff: none  Referrals made by [facility] staff: none      [facility]  Transitions Assessment    Patient's Address:   [address] [place] [id number]  **  If this is not the address patient will receive services - alert team and address in EMR**       Patient Contacts:  Extended Emergency Contact Information  Primary Emergency Contact: Imelda Kern  Home Phone: [phone]  Work Phone: [phone]  Relation: Spouse                                Patient's Preferred Phone: [phone]  Patient's E-mail: [email or web address]

## 2024-03-05 ENCOUNTER — OFFICE VISIT (OUTPATIENT)
Dept: CARDIOLOGY | Facility: HOSPITAL | Age: 74
End: 2024-03-05
Payer: MEDICARE

## 2024-03-05 VITALS
WEIGHT: 150.3 LBS | BODY MASS INDEX: 22.26 KG/M2 | DIASTOLIC BLOOD PRESSURE: 55 MMHG | HEART RATE: 70 BPM | HEIGHT: 69 IN | SYSTOLIC BLOOD PRESSURE: 104 MMHG | OXYGEN SATURATION: 98 %

## 2024-03-05 DIAGNOSIS — I10 BENIGN ESSENTIAL HYPERTENSION: Primary | Chronic | ICD-10-CM

## 2024-03-05 DIAGNOSIS — I25.10 CORONARY ARTERY DISEASE INVOLVING NATIVE HEART WITHOUT ANGINA PECTORIS, UNSPECIFIED VESSEL OR LESION TYPE: Chronic | ICD-10-CM

## 2024-03-05 DIAGNOSIS — I50.21 ACUTE SYSTOLIC (CONGESTIVE) HEART FAILURE (MULTI): ICD-10-CM

## 2024-03-05 DIAGNOSIS — I50.20 HFREF (HEART FAILURE WITH REDUCED EJECTION FRACTION) (MULTI): Chronic | ICD-10-CM

## 2024-03-05 DIAGNOSIS — I50.9 CONGESTIVE HEART FAILURE, UNSPECIFIED HF CHRONICITY, UNSPECIFIED HEART FAILURE TYPE (MULTI): ICD-10-CM

## 2024-03-05 PROCEDURE — 1157F ADVNC CARE PLAN IN RCRD: CPT | Performed by: NURSE PRACTITIONER

## 2024-03-05 PROCEDURE — 3078F DIAST BP <80 MM HG: CPT | Performed by: NURSE PRACTITIONER

## 2024-03-05 PROCEDURE — 99214 OFFICE O/P EST MOD 30 MIN: CPT | Performed by: NURSE PRACTITIONER

## 2024-03-05 PROCEDURE — 3074F SYST BP LT 130 MM HG: CPT | Performed by: NURSE PRACTITIONER

## 2024-03-05 PROCEDURE — 1159F MED LIST DOCD IN RCRD: CPT | Performed by: NURSE PRACTITIONER

## 2024-03-05 PROCEDURE — 1160F RVW MEDS BY RX/DR IN RCRD: CPT | Performed by: NURSE PRACTITIONER

## 2024-03-05 PROCEDURE — 1126F AMNT PAIN NOTED NONE PRSNT: CPT | Performed by: NURSE PRACTITIONER

## 2024-03-05 PROCEDURE — 1036F TOBACCO NON-USER: CPT | Performed by: NURSE PRACTITIONER

## 2024-03-05 PROCEDURE — 1111F DSCHRG MED/CURRENT MED MERGE: CPT | Performed by: NURSE PRACTITIONER

## 2024-03-05 RX ORDER — TORSEMIDE 100 MG/1
100 TABLET ORAL DAILY
Qty: 90 TABLET | Refills: 3 | Status: SHIPPED | OUTPATIENT
Start: 2024-03-05 | End: 2024-05-07

## 2024-03-05 RX ORDER — CLOPIDOGREL BISULFATE 75 MG/1
75 TABLET ORAL DAILY
Qty: 90 TABLET | Refills: 3 | Status: SHIPPED | OUTPATIENT
Start: 2024-03-05

## 2024-03-05 RX ORDER — ROSUVASTATIN CALCIUM 40 MG/1
40 TABLET, COATED ORAL DAILY
Qty: 90 TABLET | Refills: 3 | Status: SHIPPED | OUTPATIENT
Start: 2024-03-05

## 2024-03-05 RX ORDER — SPIRONOLACTONE 25 MG/1
12.5 TABLET ORAL DAILY
Qty: 45 TABLET | Refills: 3 | Status: SHIPPED | OUTPATIENT
Start: 2024-03-05 | End: 2024-05-07

## 2024-03-05 ASSESSMENT — ENCOUNTER SYMPTOMS: HYPERTENSION: 1

## 2024-03-05 NOTE — PROGRESS NOTES
Subjective   Wojciech Kern is a 73 y.o. male.    Chief Complaint:  Hyperlipidemia, Coronary Artery Disease, Heart Failure, Heart Murmur, and Hypertension    Mr. Kern returns for a follow up. He is seen in collaboration with Dr. Panchal. He was recently discharged (2/21/2024) for CHF and COPD exacerbation. He has been feeling fairly well since discharge. His weight has remained stable around 150 lbs. He still has some dyspnea on exertion, though this has significantly improved. He is also complaining of some dizziness. He denies any exertional chest pain. He offers no other cardiovascular complaints or concerns. He denies any complaints of chest pain, palpitations, syncope, orthopnea, paroxysmal nocturnal dyspnea, lower extremity swelling or bleeding concerns.      Hyperlipidemia    Coronary Artery Disease  Risk factors include hyperlipidemia.   Heart Murmur  Hypertension        Review of Systems   All other systems reviewed and are negative.      Objective   Physical Exam  Constitutional:       Appearance: Healthy appearance. In no distress  Pulmonary:      Effort: Pulmonary effort is normal.      Breath sounds: Normal breath sounds.   Cardiovascular:      Normal rate. Regular rhythm. Normal S1. Normal S2.       Murmurs: There is no murmur.      Carotids: right carotid pulse +2, no bruit heard over the right carotid. left carotid pulse +2, no bruit heard over the left carotid.  Edema:     Peripheral edema absent.   Abdominal:      Palpations: Abdomen is soft.   Musculoskeletal:       Cervical back: Normal range of motion.   Skin:     General: Skin is warm and dry. Normal color and pigmentation   Neurological:      Mental Status: Alert and oriented to person, place and time.   Psychiatric:     Mood and Affect: appropriate mood and appropriate affect.     EKG obtained and reviewed. Normal sinus rhythm. Inferior infarct, age undetermined. T wave abnormality, lateral ischemia. HR 70    Lab Review:   Lab Results    Component Value Date     02/21/2024    K 3.4 (L) 02/21/2024     02/21/2024    CO2 28 02/21/2024    BUN 34 (H) 02/21/2024    CREATININE 1.55 (H) 02/21/2024    GLUCOSE 91 02/21/2024    CALCIUM 8.6 02/21/2024     Lab Results   Component Value Date    WBC 7.0 02/21/2024    HGB 11.4 (L) 02/21/2024    HCT 34.3 (L) 02/21/2024    MCV 94 02/21/2024     02/21/2024     Lab Results   Component Value Date    CHOL 107 06/21/2023    TRIG 43 06/21/2023    HDL 46.0 06/21/2023       Assessment/Plan   Mr. Kern is pleasant 73 year old  male with a past medical history significant for hypertension, hyperlipidemia, COPD, CKD, CAD/inferior MI s/p 3 HELEN to RCA that was c/b guidewire dissection requiring 4th HELEN, developing a VSD s/p CABG x 1 (SVG-LAD) and VSD repair in 2015 and cardiomyopathy s/p SQ ICD placement in 2018. Echocardiogram 11/2023 showed an EF of 25-30% with mildly reduced RV systolic function and moderate functional MR. Heart catheterization 9/2022 showed moderate LM disease similar to prior angiography, patent RCA stents to level of RPDA, subintimal stent in RPL occluded and patent SVG-LAD. He presents today for routine follow up stable from a cardiac standpoint. He was also recently discharged (2/21/2024) for CHF and COPD exacerbation. He has been feeling well since discharge. His weight has remained stable around 150 lbs. I will have him continue all medications unchanged, including remaining off carvedilol. I will have him obtain blood work today to recheck his kidney function. He will follow up with us in clinic in 2 months. He knows to call for any concerns.

## 2024-03-08 ENCOUNTER — APPOINTMENT (OUTPATIENT)
Dept: PRIMARY CARE | Facility: CLINIC | Age: 74
End: 2024-03-08
Payer: MEDICARE

## 2024-03-08 ENCOUNTER — APPOINTMENT (OUTPATIENT)
Dept: PHARMACY | Facility: HOSPITAL | Age: 74
End: 2024-03-08
Payer: MEDICARE

## 2024-03-08 ENCOUNTER — TELEMEDICINE (OUTPATIENT)
Dept: PHARMACY | Facility: HOSPITAL | Age: 74
End: 2024-03-08
Payer: MEDICARE

## 2024-03-08 ENCOUNTER — PATIENT OUTREACH (OUTPATIENT)
Dept: HOME HEALTH SERVICES | Age: 74
End: 2024-03-08

## 2024-03-08 VITALS
HEART RATE: 69 BPM | DIASTOLIC BLOOD PRESSURE: 68 MMHG | SYSTOLIC BLOOD PRESSURE: 129 MMHG | BODY MASS INDEX: 22.15 KG/M2 | WEIGHT: 150 LBS

## 2024-03-08 DIAGNOSIS — I50.20 HFREF (HEART FAILURE WITH REDUCED EJECTION FRACTION) (MULTI): Chronic | ICD-10-CM

## 2024-03-08 DIAGNOSIS — J43.9 PULMONARY EMPHYSEMA, UNSPECIFIED EMPHYSEMA TYPE (MULTI): Primary | Chronic | ICD-10-CM

## 2024-03-08 NOTE — PROGRESS NOTES
Pharmacy Post-Discharge Visit - Follow Up   Wojciech Kern is a 73 y.o. male was referred to Clinical Pharmacy Team to complete a post-discharge medication optimization and monitoring visit.  The patient was referred for their CHF and COPD management and to help with cost of his expensive, brand name meds. He is also enrolled in [facility]. Today, he is doing well - his weights and BP's have both been stable and no issues with breathing at all. I had to also unfortunately tell him that he was not approved for PAP for his entresto, spiriva or jardiance. I offered to send him info to try and submit for assistance through the  as well.       Referring Provider: Marina Canada, SHANNON-CNP, DNP  PCP: Helio Sparks MD    Subjective   Allergies   Allergen Reactions    Penicillins Anaphylaxis and Hives    Sulfa (Sulfonamide Antibiotics) [id number] - [place], [place] - [address]  [address]  [facility] [address]  Phone: [phone] Fax: [phone]    [facility]ly [facility]  [address], [address]  [facility] [address]  Phone: [phone] Fax: [phone]      Social History     Social History Narrative    Not on file          HPI  CHF Assessment     Symptom/Staging:  -Most recent ejection fraction: 25-30%  -NYHA Stage: n/a  -ABCD Stage: C  -Weight changes?: No, has been stable this past week      Guideline-Directed Medical Therapy:  -ARNI: Yes, describe: Entresto              -If no, then ACEi/ARB?: No  -Beta Blocker: Yes, describe: Carvedilol - has stopped taking after seeing cardio on 3/5  -MRA: Yes, describe: Spironolactone  -SGLT2i: Yes, describe: Jardiance     Secondary Prevention:  -The ASCVD Risk score (Arsenio GRIFFITH, et al., 2019) failed to calculate for the following reasons:    The patient has a prior MI or stroke diagnosis   -Aspirin 81mg? yes  -Statin?: Yes, describe: Rosuvastatin  -HTN?: No      COPD  "ASSESSMENT     Rescue Inhaler Use:  -How many times per week do you use your rescue inhale? Has not had to use since being home      Inhaler Technique:  -How do you use your rescue inhaler?  --as needed     -How do you use your maintenance inhaler?  --2 puffs daily      Secondary Prevention (vaccines):  -Influenza: Date [n/a]  -PCV13: Date [n/a]  -PPSV23: Date [n/a]     Sx Management:  -Increased cough? no  -Increased sputum production? no  -Increased SOB? no     Exacerbation Hx:  -When was your last hospitalization for an exacerbation? More than a year ago  -When was the last time you were treated with antibiotics and/or steroids? Unknown      Review of Systems    Medication System Management:  Affordability/Accessibility: Tried to enroll into PAP for his entresto, jardiance and spiriva but unfortunately was denied   Adherence/Organization: no concerns   Adverse Effects: none    Objective     There were no vitals taken for this visit.     LAB  Lab Results   Component Value Date    BILITOT 0.7 02/19/2024    CALCIUM 8.6 02/21/2024    CO2 28 02/21/2024     02/21/2024    CREATININE 1.55 (H) 02/21/2024    GLUCOSE 91 02/21/2024    ALKPHOS 45 02/19/2024    K 3.4 (L) 02/21/2024    PROT 6.8 02/19/2024     02/21/2024    AST 14 02/19/2024    ALT 11 02/19/2024    BUN 34 (H) 02/21/2024    ANIONGAP 11 02/21/2024    MG 2.00 02/19/2024    PHOS 3.3 11/22/2022    ALBUMIN 3.7 02/19/2024    LIPASE 69 08/16/2018    GFRF CANCELED 08/25/2023    GFRMALE CANCELED 08/25/2023     Lab Results   Component Value Date    TRIG 43 06/21/2023    CHOL 107 06/21/2023    HDL 46.0 06/21/2023     No results found for: \"HGBA1C\"      Current Outpatient Medications on File Prior to Visit   Medication Sig Dispense Refill    albuterol (ProAir HFA) 90 mcg/actuation inhaler Inhale 2 puffs every 4 hours if needed for wheezing or shortness of breath. 8.5 g 0    aspirin 81 mg EC tablet Take 1 tablet (81 mg) by mouth once daily.      clopidogrel " (Plavix) 75 mg tablet Take 1 tablet (75 mg) by mouth once daily. 90 tablet 3    empagliflozin (Jardiance) 10 mg Take 1 tablet (10 mg) by mouth once daily. 90 tablet 3    multivitamin (MULTIPLE VITAMINS ORAL) Take 1 tablet by mouth once daily.      rosuvastatin (Crestor) 40 mg tablet Take 1 tablet (40 mg) by mouth once daily. 90 tablet 3    sacubitriL-valsartan (Entresto)  mg tablet Take 1 tablet by mouth 2 times a day. 180 tablet 0    spironolactone (Aldactone) 25 mg tablet Take 0.5 tablets (12.5 mg) by mouth once daily. 45 tablet 3    tamsulosin (Flomax) 0.4 mg 24 hr capsule Take 1 capsule (0.4 mg) by mouth once daily. 90 capsule 1    tiotropium (Spiriva Respimat) 2.5 mcg/actuation inhaler Inhale 2 puffs once daily. 12 g 3    torsemide (Demadex) 100 mg tablet Take 1 tablet (100 mg) by mouth once daily. 90 tablet 3    [DISCONTINUED] carvedilol (Coreg) 6.25 mg tablet Take 1 tablet (6.25 mg) by mouth 2 times a day. (Patient not taking: Reported on 3/5/2024) 180 tablet 0    [DISCONTINUED] clopidogrel (Plavix) 75 mg tablet Take 1 tablet (75 mg) by mouth once daily.      [DISCONTINUED] rosuvastatin (Crestor) 40 mg tablet Take 1 tablet (40 mg) by mouth once daily.      [DISCONTINUED] spironolactone (Aldactone) 25 mg tablet Take 0.5 tablets (12.5 mg) by mouth once daily. Do not start before February 21, 2024. 15 tablet 0    [DISCONTINUED] torsemide (Demadex) 100 mg tablet Take 1 tablet (100 mg) by mouth once daily. 30 tablet 0     No current facility-administered medications on file prior to visit.        Assessment/Plan   Problem List Items Addressed This Visit    None  CHF  Continue GDMT --> jardiance, entresto and spironolactone  Stopped carvedilol after cardio follow up on 3/5  Continue torsemide  Continue daily weights and BP checks   BP today 129/68  COPD  Continue spiriva   Albuterol as needed   Has pulse ox to check oxygen if ever feeling SOB      Unfortunately, he was denied for PAP. Overall, he has been  doing very well and stable since being discharged and only wanted to stay in [facility] for the help of costs of his medications, so he will graduate from the program then today. He did not want to schedule a follow up appointment with myself since I was not able to help with cost of meds through PAP. I still will email him the forms to try and fill out with either his pcp or cardiologist to try and get the meds through the .     Continue all meds under the continuation of care with the referring provider and clinical pharmacy team.    Alexander Spaulding, Bill     Verbal consent to manage patient's drug therapy was obtained from [the patient and/or an individual authorized to act on behalf of a patient]. They were informed they may decline to participate or withdraw from participation in pharmacy services at any time.

## 2024-03-08 NOTE — PROGRESS NOTES
Daily Call Note: Weekly HHVC completed with Dr. Harmon and Alexander Spaulding, PharmD. Patient states he is feeling pretty good today. Patient denies SOB and CP, denies fever and chills. No swelling in legs. He states he does not need any refills at this time. Alexander updated him that he did not qualify for the PAP, he was under for the annual income, but over based on monthly income, but she will email some forms to apply through the  to get discounts on Jardiance, Spiriva, and Entresto. He will fill out his portion and take to PCP next week for doctor to fill out. Discussed graduation from the program at this time, patient agrees due to no other needs. Provided with [facility] contact number and Alexander said her contact information would be in the email that she will be sending, he verbalized understanding. No other questions or concerns at this time.    Pt Education: per POC  Barriers: none  Topics for Daily Review:   Pt demonstrates clear understanding: Yes    Daily Weight:  There were no vitals filed for this visit.   Last 3 Weights:  Wt Readings from Last 7 Encounters:   03/05/24 68.2 kg (150 lb 4.8 oz)   03/01/24 68 kg (150 lb)   02/28/24 68 kg (150 lb)   02/23/24 69.4 kg (153 lb)   02/19/24 73 kg (160 lb 15 oz)   12/01/23 73 kg (161 lb)   11/20/23 69.4 kg (152 lb 14.4 oz)       Masimo Device: No   Masimo Clinical Impression: N/A    Virtual Visits--Scheduled (Most Recent Date at Top)  Follow up Appointments  Recent Visits  No visits were found meeting these conditions.  Showing recent visits within past 30 days and meeting all other requirements  Future Appointments  Date Type Provider Dept   03/14/24 Appointment Helio Sparks MD Do [id number]   03/21/24 Appointment Helio Sparks MD Do [id number]   Showing future appointments within next 90 days and meeting all other requirements       Frequency of RN Calls & Virtual Visits per Team Agreement: [facility] Frequency:  Friday    Medication issues Addressed (what was done): se note    Follow up appointments scheduled by [facility] Staff: none  Referrals made by [facility] staff: none      [facility] Transitions Assessment    Patient's Address:   [address] [place] [id number]  **  If this is not the address patient will receive services - alert team and address in EMR**       Patient Contacts:  Extended Emergency Contact Information  Primary Emergency Contact: Imelda Kern  Home Phone: [phone]  Work Phone: [phone]  Relation: Spouse                                Patient's Preferred Phone: [phone]  Patient's E-mail: [email or web address]

## 2024-03-11 ENCOUNTER — APPOINTMENT (OUTPATIENT)
Dept: PULMONOLOGY | Facility: CLINIC | Age: 74
End: 2024-03-11
Payer: MEDICARE

## 2024-03-14 ENCOUNTER — OFFICE VISIT (OUTPATIENT)
Dept: PRIMARY CARE | Facility: CLINIC | Age: 74
End: 2024-03-14
Payer: MEDICARE

## 2024-03-14 VITALS
DIASTOLIC BLOOD PRESSURE: 66 MMHG | WEIGHT: 149 LBS | SYSTOLIC BLOOD PRESSURE: 108 MMHG | RESPIRATION RATE: 12 BRPM | HEIGHT: 69 IN | OXYGEN SATURATION: 97 % | BODY MASS INDEX: 22.07 KG/M2 | HEART RATE: 88 BPM

## 2024-03-14 DIAGNOSIS — I25.10 CORONARY ARTERY DISEASE INVOLVING NATIVE HEART WITHOUT ANGINA PECTORIS, UNSPECIFIED VESSEL OR LESION TYPE: Chronic | ICD-10-CM

## 2024-03-14 DIAGNOSIS — I10 BENIGN ESSENTIAL HYPERTENSION: Chronic | ICD-10-CM

## 2024-03-14 DIAGNOSIS — R25.2 NOCTURNAL MUSCLE CRAMP: ICD-10-CM

## 2024-03-14 DIAGNOSIS — J43.9 PULMONARY EMPHYSEMA, UNSPECIFIED EMPHYSEMA TYPE (MULTI): Chronic | ICD-10-CM

## 2024-03-14 DIAGNOSIS — I50.20 HFREF (HEART FAILURE WITH REDUCED EJECTION FRACTION) (MULTI): Primary | Chronic | ICD-10-CM

## 2024-03-14 DIAGNOSIS — I21.4 NON-ST ELEVATION MYOCARDIAL INFARCTION (NSTEMI) IN RECOVERY PHASE (MULTI): ICD-10-CM

## 2024-03-14 DIAGNOSIS — N18.31 STAGE 3A CHRONIC KIDNEY DISEASE (MULTI): Chronic | ICD-10-CM

## 2024-03-14 PROBLEM — L03.211 CELLULITIS OF FACE: Status: RESOLVED | Noted: 2023-04-06 | Resolved: 2024-03-14

## 2024-03-14 PROCEDURE — 1159F MED LIST DOCD IN RCRD: CPT | Performed by: FAMILY MEDICINE

## 2024-03-14 PROCEDURE — 99214 OFFICE O/P EST MOD 30 MIN: CPT | Performed by: FAMILY MEDICINE

## 2024-03-14 PROCEDURE — 3074F SYST BP LT 130 MM HG: CPT | Performed by: FAMILY MEDICINE

## 2024-03-14 PROCEDURE — 1157F ADVNC CARE PLAN IN RCRD: CPT | Performed by: FAMILY MEDICINE

## 2024-03-14 PROCEDURE — 1111F DSCHRG MED/CURRENT MED MERGE: CPT | Performed by: FAMILY MEDICINE

## 2024-03-14 PROCEDURE — 1036F TOBACCO NON-USER: CPT | Performed by: FAMILY MEDICINE

## 2024-03-14 PROCEDURE — 1160F RVW MEDS BY RX/DR IN RCRD: CPT | Performed by: FAMILY MEDICINE

## 2024-03-14 PROCEDURE — 3078F DIAST BP <80 MM HG: CPT | Performed by: FAMILY MEDICINE

## 2024-03-14 ASSESSMENT — ENCOUNTER SYMPTOMS
FATIGUE: 1
SHORTNESS OF BREATH: 1
EYE REDNESS: 0
DYSPHORIC MOOD: 0
SORE THROAT: 0
CHILLS: 0
ARTHRALGIAS: 0
BLOOD IN STOOL: 0
ADENOPATHY: 0
ABDOMINAL PAIN: 0
DIZZINESS: 0
CONSTIPATION: 0
ABDOMINAL DISTENTION: 0
NERVOUS/ANXIOUS: 0
DYSURIA: 0
HEADACHES: 0
DIFFICULTY URINATING: 0
BRUISES/BLEEDS EASILY: 0
APPETITE CHANGE: 0
CHEST TIGHTNESS: 0
COUGH: 0
DIARRHEA: 0
EYE PAIN: 0
WEAKNESS: 0
FEVER: 0
BACK PAIN: 0

## 2024-03-14 NOTE — PROGRESS NOTES
"Subjective   Patient ID: Wojciech Kern is a 73 y.o. male who presents for Hospital Follow-up.    HPI     Review of Systems    Objective   /66   Pulse 88   Resp 12   Ht 1.753 m (5' 9\")   Wt 67.6 kg (149 lb)   SpO2 97%   BMI 22.00 kg/m²     Physical Exam    Assessment/Plan          "

## 2024-03-14 NOTE — PROGRESS NOTES
"Subjective   Patient ID: Wojciech Kern is a 73 y.o. male who presents for Hospital Follow-up.  Pt here for follow up from hospital 2/19-21, he was at [facility] with SOB. He was found to be in CHF and COPD exacerbation. He has hx of CAD, NSTEMI, pacemaker. He was given IV diuresis and started on Spiriva.  Also he was changed off Carvedilol and on  to Aldactone, Entresto.  Also started on Spiriva.  He feels more tired with the med changes. He say cardiology. Breathing is stable.  Kidney function lower and needs to be rechecked. He needs to schedule with pulmonary. BP looks good today. He continues to have nocturnal muscle cramps.         Review of Systems   Constitutional:  Positive for fatigue. Negative for appetite change, chills and fever.   HENT:  Negative for congestion, hearing loss and sore throat.    Eyes:  Negative for pain, redness and visual disturbance.   Respiratory:  Positive for shortness of breath. Negative for cough and chest tightness.    Cardiovascular:  Negative for chest pain and leg swelling.   Gastrointestinal:  Negative for abdominal distention, abdominal pain, blood in stool, constipation and diarrhea.   Genitourinary:  Negative for difficulty urinating and dysuria.   Musculoskeletal:  Negative for arthralgias and back pain.   Skin:  Negative for rash.   Neurological:  Negative for dizziness, weakness and headaches.   Hematological:  Negative for adenopathy. Does not bruise/bleed easily.   Psychiatric/Behavioral:  Negative for dysphoric mood. The patient is not nervous/anxious.        Objective   /66   Pulse 88   Resp 12   Ht 1.753 m (5' 9\")   Wt 67.6 kg (149 lb)   SpO2 97%   BMI 22.00 kg/m²    Physical Exam  Constitutional:       General: He is not in acute distress.     Appearance: Normal appearance.   Cardiovascular:      Rate and Rhythm: Normal rate and regular rhythm.      Heart sounds: Normal heart sounds. No murmur heard.  Pulmonary:      Effort: Pulmonary effort is normal.     "  Breath sounds: No wheezing, rhonchi or rales.      Comments: Decrease BS globally   Abdominal:      Palpations: Abdomen is soft.      Tenderness: There is no abdominal tenderness.   Neurological:      Mental Status: He is alert.   Psychiatric:         Mood and Affect: Mood normal.         Judgment: Judgment normal.           Assessment/Plan   Diagnoses and all orders for this visit:  HFrEF (heart failure with reduced ejection fraction) /Coronary artery disease - euvolemic, continue current meds per cardiology. If [facility] does not have your medicine in stock request med refill to another pharmacy.  Benign essential hypertension - stable, continue current meds and monitor  Pulmonary emphysema - improved, continue Spiriva. Need to schedule with Pulmonary.  Stage 3a chronic kidney disease  - discussed keeping well hydrated, recommend 2L fluids per day  Nocturnal muscle cramp - discussed hydration, dietary calcium and potassium.     Follow up in 3months, 30mins

## 2024-03-19 DIAGNOSIS — J43.9 PULMONARY EMPHYSEMA, UNSPECIFIED EMPHYSEMA TYPE (MULTI): ICD-10-CM

## 2024-03-19 RX ORDER — ALBUTEROL SULFATE 90 UG/1
2 AEROSOL, METERED RESPIRATORY (INHALATION) EVERY 4 HOURS PRN
Qty: 8.5 G | Refills: 0 | Status: SHIPPED | OUTPATIENT
Start: 2024-03-19 | End: 2025-03-19

## 2024-03-21 ENCOUNTER — APPOINTMENT (OUTPATIENT)
Dept: PRIMARY CARE | Facility: CLINIC | Age: 74
End: 2024-03-21
Payer: MEDICARE

## 2024-03-22 ENCOUNTER — PATIENT OUTREACH (OUTPATIENT)
Dept: CARE COORDINATION | Facility: CLINIC | Age: 74
End: 2024-03-22

## 2024-03-22 ENCOUNTER — LAB (OUTPATIENT)
Dept: LAB | Facility: LAB | Age: 74
End: 2024-03-22
Payer: MEDICARE

## 2024-03-22 DIAGNOSIS — N18.4 CHRONIC RENAL FAILURE (CRF), STAGE 4 (SEVERE) (MULTI): Primary | ICD-10-CM

## 2024-03-22 DIAGNOSIS — I10 BENIGN ESSENTIAL HYPERTENSION: Chronic | ICD-10-CM

## 2024-03-22 DIAGNOSIS — R80.9 PROTEINURIA, UNSPECIFIED TYPE: ICD-10-CM

## 2024-03-22 DIAGNOSIS — I50.20 HFREF (HEART FAILURE WITH REDUCED EJECTION FRACTION) (MULTI): Chronic | ICD-10-CM

## 2024-03-22 LAB
ANION GAP SERPL CALC-SCNC: 15 MMOL/L (ref 10–20)
APPEARANCE UR: CLEAR
BILIRUB UR STRIP.AUTO-MCNC: NEGATIVE MG/DL
BUN SERPL-MCNC: 82 MG/DL (ref 6–23)
CALCIUM SERPL-MCNC: 9.7 MG/DL (ref 8.6–10.6)
CHLORIDE SERPL-SCNC: 93 MMOL/L (ref 98–107)
CO2 SERPL-SCNC: 28 MMOL/L (ref 21–32)
COLOR UR: ABNORMAL
CREAT SERPL-MCNC: 2.57 MG/DL (ref 0.5–1.3)
EGFRCR SERPLBLD CKD-EPI 2021: 25 ML/MIN/1.73M*2
GLUCOSE SERPL-MCNC: 90 MG/DL (ref 74–99)
GLUCOSE UR STRIP.AUTO-MCNC: ABNORMAL MG/DL
HOLD SPECIMEN: NORMAL
HYALINE CASTS #/AREA URNS AUTO: ABNORMAL /LPF
KETONES UR STRIP.AUTO-MCNC: NEGATIVE MG/DL
LEUKOCYTE ESTERASE UR QL STRIP.AUTO: NEGATIVE
MUCOUS THREADS #/AREA URNS AUTO: ABNORMAL /LPF
NITRITE UR QL STRIP.AUTO: NEGATIVE
PH UR STRIP.AUTO: 5.5 [PH]
POTASSIUM SERPL-SCNC: 4.3 MMOL/L (ref 3.5–5.3)
PROT UR STRIP.AUTO-MCNC: NEGATIVE MG/DL
RBC # UR STRIP.AUTO: ABNORMAL /UL
RBC #/AREA URNS AUTO: ABNORMAL /HPF
SODIUM SERPL-SCNC: 132 MMOL/L (ref 136–145)
SP GR UR STRIP.AUTO: 1.01
UROBILINOGEN UR STRIP.AUTO-MCNC: NORMAL MG/DL
WBC #/AREA URNS AUTO: ABNORMAL /HPF

## 2024-03-22 PROCEDURE — 80048 BASIC METABOLIC PNL TOTAL CA: CPT

## 2024-03-22 PROCEDURE — 81001 URINALYSIS AUTO W/SCOPE: CPT

## 2024-03-22 PROCEDURE — 36415 COLL VENOUS BLD VENIPUNCTURE: CPT

## 2024-03-22 NOTE — PROGRESS NOTES
Call placed regarding one month post discharge follow up call.  At time of outreach call the patient feels as if their condition has (remained the same) since initial visit with PCP or specialist.  Questions or concerns regarding recovery period addressed at this time.  Reviewed any PCP or specialists progress notes/labs/radiology reports if applicable and addressed any questions or concerns.  [facility] has completed.

## 2024-03-25 ENCOUNTER — TELEPHONE (OUTPATIENT)
Dept: CARDIOLOGY | Facility: CLINIC | Age: 74
End: 2024-03-25
Payer: MEDICARE

## 2024-03-25 DIAGNOSIS — I50.20 HFREF (HEART FAILURE WITH REDUCED EJECTION FRACTION) (MULTI): Primary | ICD-10-CM

## 2024-03-25 NOTE — TELEPHONE ENCOUNTER
I left a  for patient. I instructed him to decrease torsemide to 1/2 tablet (50 mg) daily due to increasing creatinine. We will recheck his kidney function and electrolytes again in 2 weeks. He knows to call for any concerns.

## 2024-03-27 ENCOUNTER — TELEPHONE (OUTPATIENT)
Dept: PRIMARY CARE | Facility: CLINIC | Age: 74
End: 2024-03-27
Payer: MEDICARE

## 2024-03-27 NOTE — TELEPHONE ENCOUNTER
Patient needs refill of :    Tiotropium (Spiriva Respimat) 2.5      Inhale 2 puffs once daily.     Pharmacy : [facility]     Next appointment : 06-  LAST OFFICE VISIT 03-    Thanks...

## 2024-03-28 DIAGNOSIS — J44.1 COPD EXACERBATION (MULTI): ICD-10-CM

## 2024-04-01 ENCOUNTER — DOCUMENTATION (OUTPATIENT)
Dept: PRIMARY CARE | Facility: CLINIC | Age: 74
End: 2024-04-01
Payer: MEDICARE

## 2024-04-03 DIAGNOSIS — I50.9 CONGESTIVE HEART FAILURE, UNSPECIFIED HF CHRONICITY, UNSPECIFIED HEART FAILURE TYPE (MULTI): Primary | ICD-10-CM

## 2024-04-16 ENCOUNTER — PATIENT OUTREACH (OUTPATIENT)
Dept: PRIMARY CARE | Facility: CLINIC | Age: 74
End: 2024-04-16
Payer: MEDICARE

## 2024-04-16 DIAGNOSIS — I25.10 CORONARY ARTERY DISEASE INVOLVING NATIVE HEART WITHOUT ANGINA PECTORIS, UNSPECIFIED VESSEL OR LESION TYPE: ICD-10-CM

## 2024-04-16 DIAGNOSIS — J43.9 PULMONARY EMPHYSEMA, UNSPECIFIED EMPHYSEMA TYPE (MULTI): ICD-10-CM

## 2024-04-16 DIAGNOSIS — N18.31 STAGE 3A CHRONIC KIDNEY DISEASE (MULTI): ICD-10-CM

## 2024-04-16 DIAGNOSIS — I10 BENIGN ESSENTIAL HYPERTENSION: ICD-10-CM

## 2024-04-16 DIAGNOSIS — I50.20 HFREF (HEART FAILURE WITH REDUCED EJECTION FRACTION) (MULTI): ICD-10-CM

## 2024-04-16 PROCEDURE — 99490 CHRNC CARE MGMT STAFF 1ST 20: CPT | Performed by: FAMILY MEDICINE

## 2024-04-17 ENCOUNTER — APPOINTMENT (OUTPATIENT)
Dept: PULMONOLOGY | Facility: CLINIC | Age: 74
End: 2024-04-17
Payer: MEDICARE

## 2024-04-19 NOTE — CARE PLAN
Patient introduced to Chronic Care Management Services   Patient opted into services on 4/16/24  Services will be performed under the direction of PCP: Dr. Giorgio Sparks    I have discussed the nature and availability of the service with the patient, the patient's responsibility for potential cost sharing, only one practitioner furnishing services during a calendar month, and the right to stop services at any time.    Patient was hospitalized for CHF and COPD exacerbation in February. Patient states that he feels dizzy and weak at time of outreach. He reports that his blood pressures are consistently in 90/60s, heart rate in the 70s.  His torsemide was cut in half the end of March due to increasing creatinine. Patient is taking altered dose. His weight is stable and staying around 151 to 152 pounds. Denies edema. CM sent Epic Secure chat to his cardiologist regarding BP and symptoms, awaiting response. Patient has appointment with cardiology NP on 5/7/24. Discussed hierarchy of care should his symptoms or BP worsen.     Patient reports that he does have nocturnal muscle cramps. He has started taking magnesium to alleviate and drinking 2L of water daily.     Reviewed upcoming appointments with patient. No other questions or concerns identified at this time. Patient will reach out as needed with any non-urgent questions or concerns.

## 2024-05-06 ENCOUNTER — OFFICE VISIT (OUTPATIENT)
Dept: PULMONOLOGY | Facility: CLINIC | Age: 74
End: 2024-05-06
Payer: MEDICARE

## 2024-05-06 VITALS
RESPIRATION RATE: 18 BRPM | DIASTOLIC BLOOD PRESSURE: 74 MMHG | BODY MASS INDEX: 21.77 KG/M2 | HEART RATE: 64 BPM | WEIGHT: 147 LBS | TEMPERATURE: 97.5 F | HEIGHT: 69 IN | SYSTOLIC BLOOD PRESSURE: 114 MMHG | OXYGEN SATURATION: 95 %

## 2024-05-06 DIAGNOSIS — J44.9 CHRONIC OBSTRUCTIVE PULMONARY DISEASE, UNSPECIFIED COPD TYPE (MULTI): Primary | ICD-10-CM

## 2024-05-06 DIAGNOSIS — J44.1 COPD EXACERBATION (MULTI): ICD-10-CM

## 2024-05-06 DIAGNOSIS — R06.00 DYSPNEA, UNSPECIFIED TYPE: ICD-10-CM

## 2024-05-06 DIAGNOSIS — I50.9 CONGESTIVE HEART FAILURE, UNSPECIFIED HF CHRONICITY, UNSPECIFIED HEART FAILURE TYPE (MULTI): ICD-10-CM

## 2024-05-06 PROCEDURE — 1126F AMNT PAIN NOTED NONE PRSNT: CPT | Performed by: STUDENT IN AN ORGANIZED HEALTH CARE EDUCATION/TRAINING PROGRAM

## 2024-05-06 PROCEDURE — 99213 OFFICE O/P EST LOW 20 MIN: CPT | Performed by: STUDENT IN AN ORGANIZED HEALTH CARE EDUCATION/TRAINING PROGRAM

## 2024-05-06 PROCEDURE — 1160F RVW MEDS BY RX/DR IN RCRD: CPT | Performed by: STUDENT IN AN ORGANIZED HEALTH CARE EDUCATION/TRAINING PROGRAM

## 2024-05-06 PROCEDURE — 3078F DIAST BP <80 MM HG: CPT | Performed by: STUDENT IN AN ORGANIZED HEALTH CARE EDUCATION/TRAINING PROGRAM

## 2024-05-06 PROCEDURE — 1036F TOBACCO NON-USER: CPT | Performed by: STUDENT IN AN ORGANIZED HEALTH CARE EDUCATION/TRAINING PROGRAM

## 2024-05-06 PROCEDURE — 1159F MED LIST DOCD IN RCRD: CPT | Performed by: STUDENT IN AN ORGANIZED HEALTH CARE EDUCATION/TRAINING PROGRAM

## 2024-05-06 PROCEDURE — 3074F SYST BP LT 130 MM HG: CPT | Performed by: STUDENT IN AN ORGANIZED HEALTH CARE EDUCATION/TRAINING PROGRAM

## 2024-05-06 PROCEDURE — 1157F ADVNC CARE PLAN IN RCRD: CPT | Performed by: STUDENT IN AN ORGANIZED HEALTH CARE EDUCATION/TRAINING PROGRAM

## 2024-05-06 ASSESSMENT — ENCOUNTER SYMPTOMS
ALLERGIC/IMMUNOLOGIC NEGATIVE: 1
COUGH: 1
ENDOCRINE NEGATIVE: 1
MUSCULOSKELETAL NEGATIVE: 1
FATIGUE: 0
CHILLS: 0
WHEEZING: 0
GASTROINTESTINAL NEGATIVE: 1

## 2024-05-06 ASSESSMENT — PAIN SCALES - GENERAL: PAINLEVEL: 0-NO PAIN

## 2024-05-06 NOTE — PROGRESS NOTES
Department of Medicine I Division of Pulmonary, Critical Care, and Sleep Medicine   [id number] [place] [address]  Phone: [phone]  Fax: [phone]    History of Present Illness   Wojciech Kern is a 74 y.o. male  here for pulmonary follow up visit     5/6/2024  Since the last visit   He was hospitalized 2/18/24--2/21/24 for increased dyspnea--treated for CHF exacerbation;  had just returned from trip to [place]   Does endorse persistent cough  Walks a mile a day--working on getting back to the [facility]     Pulmonary medications: spiriva (has not had for a few months)     11/2023  Referred by:  Dr. Flores.  I have independently interviewed and examined the patient in the office and reviewed available records.  He is here for review of his CT scan but notes he is more concerned about the insomnia he is having   Recent admission to [facility] 8/20/2023--8/24/23 for pneumonia   Endorses 3 admissions for pneumonia in the last year   Does endorse chronic congestion and chronic cough with clear phlegm.   Chart diagnosis of   COPD--> diagnosed several years ago.  ? Wheezing   Denies any trouble swallowing   Constant runny nose, recurrent bronchitis   Goes to bed at 11pm  Does sometimes drink caffeinated drinks (coffee)  after noon  Takes OTC sleep aid (not sure what it was called), also takes melatonin--when he takes it he is usually able to fall right asleep   Has trouble falling asleep and then also waking up and can't get back to sleep  Endorses having to wake up frequently to urinate   Also drinking a lot of water (endorses leg cramping at night time), drinks close to bedtime, states if he doesn't he gets a lot of cramping in his legs       Pulmonary medications:  albuterol inh (rarely)   CAT score: 21   PMH: CAD CABG and VSD repair in 2015,  COPD, HFrEF, CKD3a, insomnia, macular degeneration  SH: quit smoking in 1998,  1ppd for 20 years.  Occupation:  .  Used to  restore historical homes. (Couple of years ago). 2 dogs  FH:  father lived to 89, paternal uncle: lung cancer        Review of Systems  Review of Systems   Constitutional:  Negative for chills and fatigue.   Respiratory:  Positive for cough. Negative for wheezing.    Cardiovascular:  Negative for chest pain.   Gastrointestinal: Negative.    Endocrine: Negative.    Genitourinary:         Urinates frequently at night   Musculoskeletal: Negative.    Allergic/Immunologic: Negative.      All other review of systems are negative and/or non-contributory.    Past Medical History   He has a past medical history of AICD (automatic cardioverter/defibrillator) present (04/06/2023), Atherosclerotic heart disease of native coronary artery without angina pectoris (10/28/2015), CHF (congestive heart failure) (Multi), Chronic kidney disease, stage 3 unspecified (Multi) (12/19/2019), Community acquired pneumonia (04/06/2023), Cortical age-related cataract, left eye (03/31/2016), Dysphagia, unspecified (08/20/2015), Dysuria (04/06/2023), Emphysema/COPD (Multi) (04/06/2023), Essential (primary) hypertension (02/26/2018), Gastric ulcer (04/06/2023), Gastritis (04/06/2023), Hemoptysis (05/17/2016), HFrEF (heart failure with reduced ejection fraction) (Multi) (04/06/2023), Kidney stone on left side (04/06/2023), Melena (04/06/2023), NUD (nonulcer dyspepsia) (04/06/2023), Personal history of peptic ulcer disease (05/23/2019), Personal history of pneumonia (recurrent) (03/07/2016), Prurigo nodularis (04/06/2023), and Urinary retention (04/06/2023).    Immunizations     Immunization History   Administered Date(s) Administered    Flu vaccine, quadrivalent, high-dose, preservative free, age 65y+ (FLUZONE) 12/01/2023    Influenza, seasonal, injectable 09/19/2022    Pfizer Gray Cap SARS-CoV-2 06/03/2022    Pfizer Purple Cap SARS-CoV-2 03/15/2021, 04/05/2021, 12/17/2021    Pneumococcal polysaccharide vaccine, 23-valent, age 2 years and older  "(PNEUMOVAX 23) 03/16/2015, 11/23/2022    Tdap vaccine, age 7 year and older (BOOSTRIX, ADACEL) 10/11/2014, 02/02/2017, 04/09/2019    Zoster, live 12/08/2014       Medications and Allergies     Current Outpatient Medications   Medication Instructions    albuterol (ProAir HFA) 90 mcg/actuation inhaler 2 puffs, inhalation, Every 4 hours PRN    aspirin 81 mg EC tablet 1 tablet, oral, Daily    clopidogrel (PLAVIX) 75 mg, oral, Daily    empagliflozin (JARDIANCE) 10 mg, oral, Daily    multivitamin (MULTIPLE VITAMINS ORAL) 1 tablet, oral, Daily    rosuvastatin (CRESTOR) 40 mg, oral, Daily    sacubitriL-valsartan (Entresto)  mg tablet 1 tablet, oral, 2 times daily    spironolactone (ALDACTONE) 12.5 mg, oral, Daily    tiotropium (Spiriva Respimat) 2.5 mcg/actuation inhaler 2 puffs, inhalation, Daily RT    torsemide (DEMADEX) 100 mg, oral, Daily      Penicillins and Sulfa (sulfonamide antibiotics)    Social History   He reports that he quit smoking about 26 years ago. His smoking use included cigarettes. He has never used smokeless tobacco. He reports that he does not currently use alcohol. He reports that he does not currently use drugs.    Smoking History: see hpi   Exposure/Job History: see hpi     Family History     Family History   Problem Relation Name Age of Onset    Coronary artery disease Mother      Hypertension Father             Surgical History   He has a past surgical history that includes Coronary artery bypass graft (02/26/2018) and Cataract extraction (Bilateral, 03/26/2016).    Physical Exam   /74 (BP Location: Right arm, Patient Position: Sitting)   Pulse 64   Temp 36.4 °C (97.5 °F) (Temporal)   Resp 18   Ht 1.753 m (5' 9\")   Wt 66.7 kg (147 lb)   SpO2 95%   BMI 21.71 kg/m²      Physical Exam  Constitutional:       Appearance: Normal appearance.   HENT:      Head: Normocephalic and atraumatic.   Eyes:      Pupils: Pupils are equal, round, and reactive to light.   Cardiovascular:      Rate " and Rhythm: Normal rate and regular rhythm.   Pulmonary:      Effort: Pulmonary effort is normal.      Breath sounds: Normal breath sounds.   Skin:     Findings: No rash.   Neurological:      General: No focal deficit present.      Mental Status: He is alert and oriented to person, place, and time.   Psychiatric:         Mood and Affect: Mood normal.          Results   Pulmonary Function Tests:      2023 (done after visit)   FEV1/FVC: 75  post FEV1: 70%   T.79 (128%)   RV/T (150%)   DLCO: 52% pred   DLCO corrected for alveolar volume: 74% pred     My interpretation:  no obstruction, no bronchodilator response. Evidence of air trapping, moderately reduced DLCO, but is only mildly reduced when corrected for alveolar volume.        Chest Radiograph:  XR chest 1 view 2023    Narrative  Interpreted By:  YULIANA DENNISON DO  MRN: [id number]  Patient Name: DANICA POLANCO    STUDY:  CHEST 1 VIEW;  2023 10:58 pm    INDICATION:  hemoptysis .    COMPARISON:  2023    ACCESSION NUMBER(S):  [id number]    ORDERING CLINICIAN:  NATASHA SAMUEL    FINDINGS:  AP radiograph of the chest was provided.    Median sternotomy wires and unipolar pacemaker/AICD again noted.    CARDIOMEDIASTINAL SILHOUETTE:  Enlarged, similar to prior imaging with calcifications of the aortic  arch.    LUNGS:  Bilateral airspace opacities appear progressed from prior imaging. No  large pleural effusion or pneumothorax identified.    ABDOMEN:  No remarkable upper abdominal findings.    BONES:  No acute osseous changes.    Impression  1.  Enlarged cardiac silhouette and bilateral lower lobe airspace  opacities which appear progressed from prior imaging. Correlation for  bilateral infection recommended.        MACRO:  None      Chest CT Scan:  2024  IMPRESSION:  1.  No evidence of pulmonary emboli. There is similar appearance of  small right pleural effusion. Interval development of diffuse  interseptal thickening suggestive of  pulmonary venous congestion.  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 Cardiology follow-up is recommended.  Enlarged main pulmonary trunk suggestive of pulmonary arterial  hypertension.  2. Moderate centrilobular emphysema        CT Chest 9/08/23  IMPRESSION:  1. Significant interval decrease in extent of opacities in the right  lower lobe most consistent with improving pneumonia. Similar  opacities in the left lower lobe posterior segment compared to most  recent 2 CT studies dating back to 07/05/2023, with interval  improvement relative to CT 11/21/2022. Continued CT follow-up is  suggested, for example in 3 months.  2. Additional chronic findings are stable compared to prior CT as  detailed above including large left ventricular free wall aneurysm  and moderate pulmonary emphysema. No new abnormality in the chest is  identified.  CT angio chest for pulmonary embolism 08/20/2023    Narrative  Interpreted By:  IVAN DURAN DO  MRN: [id number]  Patient Name: DANICA POLANCO    STUDY:  CT ANGIO CHEST FOR PE;  8/20/2023 11:42 pm    INDICATION:  hemoptysis, hypoxia    COMPARISON:  CT angiogram chest 07/05/2023. CT chest 05/18/2016, 11/21/2022. Chest  x-ray 08/20/2023. CT urogram 03/05/2019.    ACCESSION NUMBER(S):  [id number]    ORDERING CLINICIAN:  NATASHA SAMUEL    TECHNIQUE:  Helical data acquisition of the chest was obtained following the  uneventful administration of  50 milliliter OMNIPAQUE 350intravenous  contrast material. Images were reformatted in axial, coronal, and  sagittal planes. MIP images were created and reviewed.    FINDINGS:  POTENTIAL LIMITATIONS OF THE STUDY: Motion artifact.    HEART AND VESSELS:  No discrete filling defects within the main pulmonary artery or its  branches.    No thoracic aortic aneurysm.    The heart is enlarged. The patient is status post open heart  surgery  .  Redemonstration of peripherally calcified aneurysm at the left  ventricular wall, not significantly changed in the interval. No  evidence of pericardial effusion.    MEDIASTINUM AND ALEXANDRO, LOWER NECK AND AXILLA:  The visualized thyroid gland is within normal limits.    No evidence of thoracic lymphadenopathy by CT criteria.    LUNGS AND AIRWAYS:  The trachea and central airways are patent.    The evaluation of the lungs is degraded by motion artifact. There are  bilateral emphysematous changes. Interval increase in the airspace  opacities at the right middle lobe, and left lower lobe. Trace right  pleural effusion. No pneumothorax.  Redemonstration of 6 mm nodule at the right lung apex, not  significantly changed since prior CT 05/18/2016 (series 606, axial  image 83).    UPPER ABDOMEN:  There is a 1.2 cm cyst at the right hepatic lobe.  There is cholelithiasis.  There is a 1.5 cm cortical hyperdense lesion at the right kidney,  previously described as a proteinaceous cyst.    CHEST WALL AND OSSEOUS STRUCTURES:  The battery pack from a pacemaker is noted at soft tissues of the  left lateral lower chest wall.  Multilevel degenerative changes at the spine.    Impression  1. No evidence of pulmonary emboli.  2. Interval increase in the bilateral airspace opacities, suggestive  of worsening pneumonia.  3. Trace right pleural effusion.  4. Emphysema.  5. Cardiomegaly. Redemonstration of peripherally calcified aneurysm  at the left ventricular wall.       ECHO:  10/25/2023  CONCLUSIONS:   1. Left ventricular systolic function is severely decreased with a 25-30% estimated ejection fraction.   2. There is global hypokinesis of the left ventricle with minor regional variations.   3. The inferior and infero lateral wall are the most hypokinetic. 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 There is a 1.4 x 1.4 cm echodensity in the submitral apparatus (clip 110). DDx includes calcified papillary  "muscle (favored) v less likely thrombus.   4. There is moderate, functional mitral valve regurgitation which is eccentrically directed. The degree of mitral regurgitation may be underestimated due to the eccentricity of the jet.   5. There is mildly reduced right ventricular systolic function.   6. The left atrium is severely dilated.   7. RVSP within normal limits.   8. Compared with study from 9/10/2022, there is now at least moderate functional mitral regurgitation, which may be underestimated due to the eccentricity of the jet. A bright echodensity in the submitral apparatus is seen in some views more easily on the current study, though it also appears in certain clips on the prior study (clip 39) and favored to be a calcified papillary muscle. Comparison of the size of the LV aneurysm is challenging due to technical limitations.          Labs:  Lab Results   Component Value Date    WBC 7.0 02/21/2024    HGB 11.4 (L) 02/21/2024    HCT 34.3 (L) 02/21/2024    MCV 94 02/21/2024     02/21/2024       Lab Results   Component Value Date    CREATININE 2.57 (H) 03/22/2024    BUN 82 (H) 03/22/2024     (L) 03/22/2024    K 4.3 03/22/2024    CL 93 (L) 03/22/2024    CO2 28 03/22/2024        No results found for: \"ALAN\", \"RF\", \"SEDRATE\"     IgE: No results found for: \"IGE\"   Respiratory Allergy Panel:  AEC:   Eosinophils Absolute (x10*3/uL)   Date Value   02/19/2024 0.00     Eosinophils % (%)   Date Value   02/19/2024 0.0       Cultures:  No results found for: \"AFBCX\"   No results found for: \"RESPCULTCYFI\"    No results found for the last 90 days.  C     Assessment and Plan       Wojciech Kern is a 74 y.o. year old male patient who is here for pulmonary follow up  (given multiple hospitalizations in the last year) with most recent being in February 2024 (CHF exacerbation); following with cardiology     BL LL  L> R parenchymal infiltrates changes   -overall improved from 1 year ago, most recent CT chest with " minimal bibasilar infiltrate--suspect atelectasis     Recurrent pneumonia --has not had since August   -perhaps related to aspiration although patient denies history supporting this  -stay up to date on vaccinations     Emphysema   -I reviewed his PFTs and there is no obvious obstruction on PFTs but there is airtrapping; was started on spiriva by PCP, has not had in the last couple of months   -continue spiriva--refilled today           Follow-up:  6 months or sooner if needed     Viviana Mccray MD  05/06/2024

## 2024-05-06 NOTE — PATIENT INSTRUCTIONS
Thank you for visiting the Pulmonary Clinic today.   Your breathing medications: continue spiriva   Referrals: continue to follow up with cardiology   Return in 6 months   If you have questions or concerns, call [phone] (option 4)

## 2024-05-07 ENCOUNTER — LAB (OUTPATIENT)
Dept: LAB | Facility: LAB | Age: 74
End: 2024-05-07
Payer: MEDICARE

## 2024-05-07 ENCOUNTER — OFFICE VISIT (OUTPATIENT)
Dept: CARDIOLOGY | Facility: HOSPITAL | Age: 74
End: 2024-05-07
Payer: MEDICARE

## 2024-05-07 VITALS
WEIGHT: 149 LBS | DIASTOLIC BLOOD PRESSURE: 63 MMHG | BODY MASS INDEX: 22.07 KG/M2 | SYSTOLIC BLOOD PRESSURE: 113 MMHG | OXYGEN SATURATION: 98 % | HEIGHT: 69 IN | HEART RATE: 64 BPM

## 2024-05-07 DIAGNOSIS — I25.10 CORONARY ARTERY DISEASE INVOLVING NATIVE HEART WITHOUT ANGINA PECTORIS, UNSPECIFIED VESSEL OR LESION TYPE: Primary | Chronic | ICD-10-CM

## 2024-05-07 DIAGNOSIS — I50.20 HFREF (HEART FAILURE WITH REDUCED EJECTION FRACTION) (MULTI): Chronic | ICD-10-CM

## 2024-05-07 DIAGNOSIS — I50.21 ACUTE SYSTOLIC (CONGESTIVE) HEART FAILURE (MULTI): ICD-10-CM

## 2024-05-07 DIAGNOSIS — I50.9 CONGESTIVE HEART FAILURE, UNSPECIFIED HF CHRONICITY, UNSPECIFIED HEART FAILURE TYPE (MULTI): ICD-10-CM

## 2024-05-07 LAB
ALBUMIN SERPL BCP-MCNC: 4.1 G/DL (ref 3.4–5)
ANION GAP SERPL CALC-SCNC: 13 MMOL/L (ref 10–20)
ATRIAL RATE: 64 BPM
BUN SERPL-MCNC: 61 MG/DL (ref 6–23)
CALCIUM SERPL-MCNC: 8.8 MG/DL (ref 8.6–10.3)
CHLORIDE SERPL-SCNC: 99 MMOL/L (ref 98–107)
CO2 SERPL-SCNC: 28 MMOL/L (ref 21–32)
CREAT SERPL-MCNC: 2.13 MG/DL (ref 0.5–1.3)
EGFRCR SERPLBLD CKD-EPI 2021: 31 ML/MIN/1.73M*2
GLUCOSE SERPL-MCNC: 100 MG/DL (ref 74–99)
MAGNESIUM SERPL-MCNC: 2.5 MG/DL (ref 1.6–2.4)
P AXIS: 43 DEGREES
P OFFSET: 181 MS
P ONSET: 121 MS
PHOSPHATE SERPL-MCNC: 3.7 MG/DL (ref 2.5–4.9)
POTASSIUM SERPL-SCNC: 4.6 MMOL/L (ref 3.5–5.3)
PR INTERVAL: 190 MS
Q ONSET: 216 MS
QRS COUNT: 11 BEATS
QRS DURATION: 112 MS
QT INTERVAL: 426 MS
QTC CALCULATION(BAZETT): 439 MS
QTC FREDERICIA: 435 MS
R AXIS: -23 DEGREES
SODIUM SERPL-SCNC: 135 MMOL/L (ref 136–145)
T AXIS: 62 DEGREES
T OFFSET: 429 MS
VENTRICULAR RATE: 64 BPM

## 2024-05-07 PROCEDURE — 93010 ELECTROCARDIOGRAM REPORT: CPT | Performed by: INTERNAL MEDICINE

## 2024-05-07 PROCEDURE — 3078F DIAST BP <80 MM HG: CPT | Performed by: NURSE PRACTITIONER

## 2024-05-07 PROCEDURE — 93005 ELECTROCARDIOGRAM TRACING: CPT | Performed by: NURSE PRACTITIONER

## 2024-05-07 PROCEDURE — 1159F MED LIST DOCD IN RCRD: CPT | Performed by: NURSE PRACTITIONER

## 2024-05-07 PROCEDURE — 99214 OFFICE O/P EST MOD 30 MIN: CPT | Performed by: NURSE PRACTITIONER

## 2024-05-07 PROCEDURE — 36415 COLL VENOUS BLD VENIPUNCTURE: CPT

## 2024-05-07 PROCEDURE — 80069 RENAL FUNCTION PANEL: CPT

## 2024-05-07 PROCEDURE — 1157F ADVNC CARE PLAN IN RCRD: CPT | Performed by: NURSE PRACTITIONER

## 2024-05-07 PROCEDURE — 83735 ASSAY OF MAGNESIUM: CPT

## 2024-05-07 PROCEDURE — 1160F RVW MEDS BY RX/DR IN RCRD: CPT | Performed by: NURSE PRACTITIONER

## 2024-05-07 PROCEDURE — 1036F TOBACCO NON-USER: CPT | Performed by: NURSE PRACTITIONER

## 2024-05-07 PROCEDURE — 3074F SYST BP LT 130 MM HG: CPT | Performed by: NURSE PRACTITIONER

## 2024-05-07 RX ORDER — SPIRONOLACTONE 25 MG/1
12.5 TABLET ORAL DAILY
Qty: 45 TABLET | Refills: 3 | Status: SHIPPED | OUTPATIENT
Start: 2024-05-07 | End: 2025-05-02

## 2024-05-07 RX ORDER — TORSEMIDE 100 MG/1
50 TABLET ORAL DAILY
Start: 2024-05-07 | End: 2025-05-02

## 2024-05-07 ASSESSMENT — ENCOUNTER SYMPTOMS: HYPERTENSION: 1

## 2024-05-07 NOTE — PROGRESS NOTES
Subjective   Wojciech Kern is a 74 y.o. male.    Chief Complaint:  Coronary Artery Disease, Hyperlipidemia, Heart Murmur, Heart Failure, and Hypertension    Mr. Kern returns for a routine 2 month follow up. He has been feeling fairly well from a cardiac standpoint. He unfortunately has only been taking entresto once daily for the past few weeks due to it being too expensive. He reports compliance with his other medications. He remains active with house work, denying any exertional chest pain or shortness of breath. His only complaint is fatigue, though he rests when he needs to. He denies any recent ER visits or hospitalizations. He offers no specific cardiovascular complaints or concerns today. He denies any complaints of chest pain, shortness of breath, lightheadedness, dizziness, palpitations, syncope, orthopnea, paroxysmal nocturnal dyspnea, lower extremity swelling or bleeding concerns.      Coronary Artery Disease  Risk factors include hyperlipidemia.   Hyperlipidemia    Heart Murmur  Hypertension        Review of Systems   All other systems reviewed and are negative.      Objective   Physical Exam  Constitutional:       Appearance: Healthy appearance. In no distress  Pulmonary:      Effort: Pulmonary effort is normal.      Breath sounds: Normal breath sounds.   Cardiovascular:      Normal rate. Regular rhythm. Normal S1. Normal S2.       Murmurs: There is no murmur.      Carotids: right carotid pulse +2, no bruit heard over the right carotid. left carotid pulse +2, no bruit heard over the left carotid.  Pacemaker/ICD Implant site has healed without signs of infection.  Edema:     Peripheral edema absent.   Abdominal:      Palpations: Abdomen is soft.   Musculoskeletal:       Cervical back: Normal range of motion.   Skin:     General: Skin is warm and dry. Normal color and pigmentation   Neurological:      Mental Status: Alert and oriented to person, place and time.   Psychiatric:     Mood and Affect:  appropriate mood and appropriate affect.     EKG obtained and reviewed. Sinus rhythm with PAC. Inferior infarct, age undetermined. T wave abnormality, lateral ischemia. HR 64      Lab Review:   Lab Results   Component Value Date     (L) 03/22/2024    K 4.3 03/22/2024    CL 93 (L) 03/22/2024    CO2 28 03/22/2024    BUN 82 (H) 03/22/2024    CREATININE 2.57 (H) 03/22/2024    GLUCOSE 90 03/22/2024    CALCIUM 9.7 03/22/2024     Lab Results   Component Value Date    WBC 7.0 02/21/2024    HGB 11.4 (L) 02/21/2024    HCT 34.3 (L) 02/21/2024    MCV 94 02/21/2024     02/21/2024     Lab Results   Component Value Date    CHOL 107 06/21/2023    TRIG 43 06/21/2023    HDL 46.0 06/21/2023       Assessment/Plan   Mr. 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 Echocardiogram 11/2023 showed an EF of 25-30% with mildly reduced RV systolic function and moderate functional MR. Heart catheterization 9/2022 showed moderate LM disease similar to prior angiography, patent RCA stents to level of RPDA, subintimal stent in RPL occluded, and patent SVG-LAD. He presents today for routine follow up stable from a cardiac standpoint. His VS and EKG remain stable. His weight has also remained stable around 150 lbs. I will have him continue all medications unchanged. I will put in a referral to our clinical pharmacy to see if we can get any of his cardiac meds more affordable, which would help with compliance. I will have him obtain blood work today to recheck his kidney function. We will work over the phone on his medications. He will follow up with us in clinic in 3 months. He knows to call for any concerns.

## 2024-05-10 ENCOUNTER — PATIENT OUTREACH (OUTPATIENT)
Dept: PRIMARY CARE | Facility: CLINIC | Age: 74
End: 2024-05-10
Payer: MEDICARE

## 2024-05-10 NOTE — CARE PLAN
Attempted [facility] patient outreach. Left voicemail, awaiting callback.

## 2024-05-21 ENCOUNTER — PATIENT OUTREACH (OUTPATIENT)
Dept: CARE COORDINATION | Facility: CLINIC | Age: 74
End: 2024-05-21
Payer: MEDICARE

## 2024-05-21 NOTE — PROGRESS NOTES
Call placed regarding 90 day post discharge follow up call.  At time of outreach call the patient feels as if their condition has improved since last outreach.  Questions or concerns regarding recovery period addressed at this time.  Reviewed any PCP or specialists progress notes/labs/radiology reports if applicable and addressed any questions or concerns.     Patient has met target of no readmission for (90) days post discharge and is graduated from Transitional Care Management program at this time.

## 2024-06-14 ENCOUNTER — APPOINTMENT (OUTPATIENT)
Dept: PRIMARY CARE | Facility: CLINIC | Age: 74
End: 2024-06-14
Payer: MEDICARE

## 2024-06-14 ENCOUNTER — PATIENT OUTREACH (OUTPATIENT)
Dept: PRIMARY CARE | Facility: CLINIC | Age: 74
End: 2024-06-14

## 2024-06-14 DIAGNOSIS — R25.2 NOCTURNAL MUSCLE CRAMP: ICD-10-CM

## 2024-06-14 DIAGNOSIS — J43.9 PULMONARY EMPHYSEMA, UNSPECIFIED EMPHYSEMA TYPE (MULTI): ICD-10-CM

## 2024-06-14 DIAGNOSIS — I10 BENIGN ESSENTIAL HYPERTENSION: ICD-10-CM

## 2024-06-14 DIAGNOSIS — I50.20 HFREF (HEART FAILURE WITH REDUCED EJECTION FRACTION) (MULTI): Chronic | ICD-10-CM

## 2024-06-14 NOTE — CARE PLAN
Reviewed chart prior to [facility] patient outreach. Patient states that he has had a lot of congestion and a productive cough (clear). He is not currently taking anything for it.     Patient is active. He walks daily at the [facility] center.     Patient reports that his blood pressures are in the low 100s/60s, heart rate in the 70s.      His weight is stable and staying around 151 to 152 pounds. Denies edema.    Denies shortness of breath. Patient is taking Spiriva.      Patient reports that he does have some nocturnal muscle cramps. He has started taking magnesium to alleviate and drinking 2L of water daily. Patient has neuropathy in his feet, and has started wearing winnie socks. He has noticed some difference. Patient plans on asking his PCP at his next appointment on 7/2/24, if a physical therapy referral for electrical stimulation therapy.     Reviewed upcoming appointments with patient. Notified patient that my last day with [facility] is on 6/28/24, and that another CM nurse will reach out to him next month. No other questions or concerns identified at this time. Patient will reach out as needed with any non-urgent questions or concerns.

## 2024-06-28 ENCOUNTER — DOCUMENTATION (OUTPATIENT)
Dept: PRIMARY CARE | Facility: CLINIC | Age: 74
End: 2024-06-28
Payer: MEDICARE

## 2024-06-28 NOTE — CARE PLAN
Handoff given to Samia Alvarez (covering RN CM) and program documentation updated.  Letter mailed with covering RN's contact information.

## 2024-07-02 ENCOUNTER — APPOINTMENT (OUTPATIENT)
Dept: PRIMARY CARE | Facility: CLINIC | Age: 74
End: 2024-07-02
Payer: MEDICARE

## 2024-07-02 VITALS
HEART RATE: 70 BPM | WEIGHT: 154 LBS | RESPIRATION RATE: 12 BRPM | BODY MASS INDEX: 22.81 KG/M2 | HEIGHT: 69 IN | DIASTOLIC BLOOD PRESSURE: 62 MMHG | OXYGEN SATURATION: 96 % | SYSTOLIC BLOOD PRESSURE: 108 MMHG

## 2024-07-02 DIAGNOSIS — E78.5 DYSLIPIDEMIA: ICD-10-CM

## 2024-07-02 DIAGNOSIS — K21.9 GASTROESOPHAGEAL REFLUX DISEASE, UNSPECIFIED WHETHER ESOPHAGITIS PRESENT: ICD-10-CM

## 2024-07-02 DIAGNOSIS — I10 BENIGN ESSENTIAL HYPERTENSION: Chronic | ICD-10-CM

## 2024-07-02 DIAGNOSIS — Z12.5 SCREENING PSA (PROSTATE SPECIFIC ANTIGEN): ICD-10-CM

## 2024-07-02 DIAGNOSIS — R26.81 UNSTEADY GAIT: ICD-10-CM

## 2024-07-02 DIAGNOSIS — J43.9 PULMONARY EMPHYSEMA, UNSPECIFIED EMPHYSEMA TYPE (MULTI): Chronic | ICD-10-CM

## 2024-07-02 DIAGNOSIS — G60.9 IDIOPATHIC PERIPHERAL NEUROPATHY: ICD-10-CM

## 2024-07-02 DIAGNOSIS — N18.32 CHRONIC RENAL IMPAIRMENT, STAGE 3B (MULTI): ICD-10-CM

## 2024-07-02 DIAGNOSIS — I25.10 CORONARY ARTERY DISEASE INVOLVING NATIVE HEART WITHOUT ANGINA PECTORIS, UNSPECIFIED VESSEL OR LESION TYPE: Chronic | ICD-10-CM

## 2024-07-02 DIAGNOSIS — R80.9 PROTEINURIA, UNSPECIFIED TYPE: ICD-10-CM

## 2024-07-02 DIAGNOSIS — R29.898 MUSCULAR DECONDITIONING: ICD-10-CM

## 2024-07-02 DIAGNOSIS — I34.0 NON-RHEUMATIC MITRAL REGURGITATION: ICD-10-CM

## 2024-07-02 DIAGNOSIS — Z95.810 AICD (AUTOMATIC CARDIOVERTER/DEFIBRILLATOR) PRESENT: Chronic | ICD-10-CM

## 2024-07-02 DIAGNOSIS — I50.20 HFREF (HEART FAILURE WITH REDUCED EJECTION FRACTION) (MULTI): Primary | Chronic | ICD-10-CM

## 2024-07-02 PROBLEM — N18.4: Status: RESOLVED | Noted: 2024-03-22 | Resolved: 2024-07-02

## 2024-07-02 PROBLEM — N18.31 STAGE 3A CHRONIC KIDNEY DISEASE (MULTI): Chronic | Status: RESOLVED | Noted: 2023-04-06 | Resolved: 2024-07-02

## 2024-07-02 PROCEDURE — 1036F TOBACCO NON-USER: CPT | Performed by: FAMILY MEDICINE

## 2024-07-02 PROCEDURE — 99214 OFFICE O/P EST MOD 30 MIN: CPT | Performed by: FAMILY MEDICINE

## 2024-07-02 PROCEDURE — 1159F MED LIST DOCD IN RCRD: CPT | Performed by: FAMILY MEDICINE

## 2024-07-02 PROCEDURE — 3078F DIAST BP <80 MM HG: CPT | Performed by: FAMILY MEDICINE

## 2024-07-02 PROCEDURE — 3074F SYST BP LT 130 MM HG: CPT | Performed by: FAMILY MEDICINE

## 2024-07-02 PROCEDURE — 1157F ADVNC CARE PLAN IN RCRD: CPT | Performed by: FAMILY MEDICINE

## 2024-07-02 RX ORDER — ROSUVASTATIN CALCIUM 20 MG/1
20 TABLET, COATED ORAL DAILY
Qty: 100 TABLET | Refills: 1 | Status: SHIPPED | OUTPATIENT
Start: 2024-07-02

## 2024-07-02 ASSESSMENT — ENCOUNTER SYMPTOMS
MYALGIAS: 1
CONSTIPATION: 0
SHORTNESS OF BREATH: 0
EYE PAIN: 0
BACK PAIN: 0
DIFFICULTY URINATING: 0
EYE REDNESS: 0
BLOOD IN STOOL: 0
ADENOPATHY: 0
DYSURIA: 0
BRUISES/BLEEDS EASILY: 0
ARTHRALGIAS: 0
APPETITE CHANGE: 0
CHEST TIGHTNESS: 0
WEAKNESS: 0
FEVER: 0
DYSPHORIC MOOD: 0
SORE THROAT: 0
ABDOMINAL DISTENTION: 0
DIARRHEA: 0
NERVOUS/ANXIOUS: 0
HEADACHES: 0
FATIGUE: 0
COUGH: 0
ABDOMINAL PAIN: 0
DIZZINESS: 0
CHILLS: 0

## 2024-07-02 NOTE — PROGRESS NOTES
"Subjective   Patient ID: Wojciech Kern is a 74 y.o. male who presents for Follow-up.  He's down about 5# in the past year. Gained back about 7# in th past 2 months. BMI is 22.    Pt has chronic and stable CAD, CHF, and HTN. Sees cardiology.  Pt is taking ASA, Plavix, Entresto, Jardiance, Aldactone and Torsemide. Tolerating well.  Exercising 7 days per week, walking  Low sodium diet is being followed.   Is not monitoring home blood pressures. Readings range 100s/60-70s.  Denies HA, vision changes or CP.     Pt has Dyslipidemia.   Lipid panel is due to recheck.  Currently taking Rosuvastatin and is tolerating but having cramping in feet at nighttime.     Pt has stable CRI.  GFR 31. BP is controlled and pt is following low sodium diet.         Review of Systems   Constitutional:  Negative for appetite change, chills, fatigue and fever.   HENT:  Negative for congestion, hearing loss and sore throat.    Eyes:  Negative for pain, redness and visual disturbance.   Respiratory:  Negative for cough, chest tightness and shortness of breath.    Cardiovascular:  Negative for chest pain and leg swelling.   Gastrointestinal:  Negative for abdominal distention, abdominal pain, blood in stool, constipation and diarrhea.   Genitourinary:  Negative for difficulty urinating and dysuria.   Musculoskeletal:  Positive for myalgias. Negative for arthralgias and back pain.   Skin:  Negative for rash.   Neurological:  Negative for dizziness, weakness and headaches.   Hematological:  Negative for adenopathy. Does not bruise/bleed easily.   Psychiatric/Behavioral:  Negative for dysphoric mood. The patient is not nervous/anxious.        Objective   /62   Pulse 70   Resp 12   Ht 1.753 m (5' 9\")   Wt 69.9 kg (154 lb)   SpO2 96%   BMI 22.74 kg/m²    Physical Exam  Constitutional:       General: He is not in acute distress.     Appearance: Normal appearance.   Cardiovascular:      Rate and Rhythm: Normal rate and regular rhythm.      " Heart sounds: Normal heart sounds. No murmur heard.  Pulmonary:      Effort: Pulmonary effort is normal.      Breath sounds: Normal breath sounds.   Abdominal:      Palpations: Abdomen is soft.      Tenderness: There is no abdominal tenderness.   Musculoskeletal:      Right lower le+ Pitting Edema present.      Left lower le+ Pitting Edema present.   Neurological:      Mental Status: He is alert.   Psychiatric:         Mood and Affect: Mood normal.         Judgment: Judgment normal.           Assessment/Plan   Diagnoses and all orders for this visit:  CAD/ HFrEF / AICD/ Mitral regurg- euvolemic and asymptomatic, continue to monitor with cardiology  Benign essential hypertension - well controlled, continue current meds and monitor  Dyslipidemia - will cut back Rosuvastatin to 20mg and monitor nighttime muscle cramps  Chronic renal impairment, stage 3b/Proteinuria - discussed importance to seeing Nephrology , referral ordered  Gastroesophageal reflux disease, hx PUD - asymptomatic, monitor  Muscular deconditioning/Idiopathic peripheral neuropathy/ Unsteady gait - referring to PT  Pulmonary emphysema - better with Spiriva, continue to follow with Dr Mccray.    Follow up in 6 months, 30mins. Preventative also due.

## 2024-07-02 NOTE — PROGRESS NOTES
"Subjective   Patient ID: Wojciech Kern is a 74 y.o. male who presents for Follow-up.    HPI     Review of Systems    Objective   /62   Pulse 70   Resp 12   Ht 1.753 m (5' 9\")   Wt 69.9 kg (154 lb)   SpO2 96%   BMI 22.74 kg/m²     Physical Exam    Assessment/Plan          "

## 2024-07-03 ENCOUNTER — APPOINTMENT (OUTPATIENT)
Dept: AUDIOLOGY | Facility: CLINIC | Age: 74
End: 2024-07-03
Payer: MEDICARE

## 2024-07-03 ENCOUNTER — APPOINTMENT (OUTPATIENT)
Dept: OTOLARYNGOLOGY | Facility: CLINIC | Age: 74
End: 2024-07-03
Payer: MEDICARE

## 2024-07-03 ENCOUNTER — CLINICAL SUPPORT (OUTPATIENT)
Dept: AUDIOLOGY | Facility: CLINIC | Age: 74
End: 2024-07-03
Payer: MEDICARE

## 2024-07-03 VITALS — HEIGHT: 69 IN | WEIGHT: 154 LBS | BODY MASS INDEX: 22.81 KG/M2

## 2024-07-03 DIAGNOSIS — H90.12 CONDUCTIVE HEARING LOSS OF LEFT EAR, UNSPECIFIED HEARING STATUS ON CONTRALATERAL SIDE: ICD-10-CM

## 2024-07-03 DIAGNOSIS — H93.13 TINNITUS OF BOTH EARS: ICD-10-CM

## 2024-07-03 DIAGNOSIS — H61.302 ACQUIRED STENOSIS OF LEFT EXTERNAL EAR CANAL: Primary | ICD-10-CM

## 2024-07-03 DIAGNOSIS — H61.22 IMPACTED CERUMEN OF LEFT EAR: ICD-10-CM

## 2024-07-03 PROCEDURE — 1157F ADVNC CARE PLAN IN RCRD: CPT | Performed by: STUDENT IN AN ORGANIZED HEALTH CARE EDUCATION/TRAINING PROGRAM

## 2024-07-03 PROCEDURE — 1159F MED LIST DOCD IN RCRD: CPT | Performed by: STUDENT IN AN ORGANIZED HEALTH CARE EDUCATION/TRAINING PROGRAM

## 2024-07-03 PROCEDURE — 1036F TOBACCO NON-USER: CPT | Performed by: STUDENT IN AN ORGANIZED HEALTH CARE EDUCATION/TRAINING PROGRAM

## 2024-07-03 PROCEDURE — 99203 OFFICE O/P NEW LOW 30 MIN: CPT | Performed by: STUDENT IN AN ORGANIZED HEALTH CARE EDUCATION/TRAINING PROGRAM

## 2024-07-03 PROCEDURE — 69210 REMOVE IMPACTED EAR WAX UNI: CPT | Performed by: STUDENT IN AN ORGANIZED HEALTH CARE EDUCATION/TRAINING PROGRAM

## 2024-07-03 NOTE — PROGRESS NOTES
Patient arrived to appointment and declined wanting audiometric evaluation.  Patient was not seen for audiometric evaluation today.    Gorge Ayala, CCC-A

## 2024-07-03 NOTE — PROGRESS NOTES
HPI  Wojciech Kern is a 74 y.o. male presenting for initial evaluation of left EAC stenosis.  The patient has a long history of hearing loss, uses hearing aids bilaterally with benefit. Reports his left external auditory canal has narrowed throughout the years making it significant difficult to place/use his left hearing aid.  In addition the patient endorses bilateral high-frequency tinnitus, more noticeable in quiet environments or when he is not using his hearing aids.  Denies ear pain, vertigo, discharge from ear, aural fullness or autophony.   Denies history of prior ear surgery.       Past Medical History:   Diagnosis Date    AICD (automatic cardioverter/defibrillator) present 04/06/2023    Atherosclerotic heart disease of native coronary artery without angina pectoris 10/28/2015    CHF (congestive heart failure) (Multi)     Chronic kidney disease, stage 3 unspecified (Multi) 12/19/2019    Chronic renal failure (CRF), stage 4 (severe) (Multi) 03/22/2024    Community acquired pneumonia 04/06/2023    Cortical age-related cataract, left eye 03/31/2016    Dysphagia, unspecified 08/20/2015    Dysuria 04/06/2023    Emphysema/COPD (Multi) 04/06/2023    Essential (primary) hypertension 02/26/2018    Gastric ulcer 04/06/2023    -H pylori     Gastritis 04/06/2023    Hemoptysis 05/17/2016    HFrEF (heart failure with reduced ejection fraction) (Multi) 04/06/2023    Kidney stone on left side 04/06/2023    Melena 04/06/2023    NUD (nonulcer dyspepsia) 04/06/2023    Personal history of peptic ulcer disease 05/23/2019    Personal history of pneumonia (recurrent) 03/07/2016    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 8.5 g 0    aspirin 81 mg EC tablet Take 1 tablet (81 mg) by mouth once daily.      clopidogrel (Plavix) 75 mg tablet Take 1 tablet (75 mg) by mouth once daily. 90 tablet 3    empagliflozin (Jardiance) 10 mg Take 1 tablet (10 mg) by mouth once daily. 90 tablet 3    multivitamin (MULTIPLE VITAMINS ORAL) Take 1 tablet by mouth once daily.      rosuvastatin (Crestor) 20 mg tablet Take 1 tablet (20 mg) by mouth once daily. 100 tablet 1    sacubitriL-valsartan (Entresto)  mg tablet Take 1 tablet by mouth 2 times a day. 180 tablet 0    spironolactone (Aldactone) 25 mg tablet Take 0.5 tablets (12.5 mg) by mouth once daily. 45 tablet 3    tiotropium (Spiriva Respimat) 2.5 mcg/actuation inhaler Inhale 2 puffs once daily. 12 g 11    torsemide (Demadex) 100 mg tablet Take 0.5 tablets (50 mg) by mouth once daily.      [DISCONTINUED] rosuvastatin (Crestor) 40 mg tablet Take 1 tablet (40 mg) by mouth once daily. 90 tablet 3     No current facility-administered medications on file prior to visit.        Allergies   Allergen Reactions    Penicillins Anaphylaxis and Hives    Sulfa (Sulfonamide Antibiotics) Hives        Review of Systems  A detailed 12 point ROS was performed and is negative except as noted in the intake form, HPI and/or Past Medical History        Physical Exam   CONSTITUTIONAL: Well developed, well nourished.  VOICE: Normal voice quality  RESPIRATION: Breathing comfortably, no stridor.  CV: No clubbing/cyanosis/edema in hands.  EYES: EOM Intact, sclera normal.  NEURO: Alert and oriented times 3, Cranial nerves V,VII intact and symmetric bilaterally.  HEAD AND FACE: Symmetric facial features, no masses or lesions, sinuses nontender to palpation.  SALIVARY GLANDS: Parotid and submandibular glands normal bilaterally.  + EARS: Normal external ears  Right  EAC patent, tympanic membrane intact  Left EAC with cerumen obstruction (removed)  Left EAC narrow/stenotic, tympanic membrane  NOSE: External nose midline, anterior rhinoscopy is normal with limited visualization to the anterior aspect of the interior turbinates. No lesions noted.  ORAL CAVITY/OROPHARYNX/LIPS: Normal mucous membranes, normal floor of mouth/tongue/OP, no masses or lesions are noted.  Uses dentures (refused removal, palate not visualized)  PHARYNGEAL WALLS AND NASOPHARYNX: No masses noted. Mucosa appears clean and moist  NECK/LYMPH: No LAD, no thyroid masses. Trachea palpably midline  SKIN: Neck skin is without injury  PSYCH: Alert and oriented with appropriate mood and affect     Procedure: Removal of impacted cerumen, left   Indication: Cerumen impaction.   The procedure was reviewed and explained.  Verbal consent was obtained.  With the use of the microscope and speculum the left ear was examined, cerumen was cleaned with the use of curettes and suction.  Hearing returned to baseline following cerumen removal.  The patient tolerated the procedure well.         Assessment  Left EAC stenosis    Plan  74-year-old male with a history of hearing loss presenting for evaluation of left ear canal stenosis.  Uses hearing aids bilaterally, nevertheless reports difficulty placing his left hearing aid in the setting of a narrow ear canal.  The condition was reviewed and explained, treatment alternatives discussed.  The patient would like to consider surgical options, he was referred to otology for further evaluation.  Refused audiogram today stating he recently had an audiogram ([facility]/ [place]).  He will bring the audiogram to his follow-up.

## 2024-07-11 ENCOUNTER — LAB (OUTPATIENT)
Dept: LAB | Facility: LAB | Age: 74
End: 2024-07-11
Payer: MEDICARE

## 2024-07-11 DIAGNOSIS — Z12.5 SCREENING PSA (PROSTATE SPECIFIC ANTIGEN): ICD-10-CM

## 2024-07-11 DIAGNOSIS — E78.5 DYSLIPIDEMIA: ICD-10-CM

## 2024-07-11 LAB
CHOLEST SERPL-MCNC: 111 MG/DL (ref 0–199)
CHOLESTEROL/HDL RATIO: 2.3
HDLC SERPL-MCNC: 48.1 MG/DL
LDLC SERPL CALC-MCNC: 47 MG/DL
NON HDL CHOLESTEROL: 63 MG/DL (ref 0–149)
PSA SERPL-MCNC: 0.4 NG/ML
TRIGL SERPL-MCNC: 79 MG/DL (ref 0–149)
VLDL: 16 MG/DL (ref 0–40)

## 2024-07-11 PROCEDURE — 36415 COLL VENOUS BLD VENIPUNCTURE: CPT

## 2024-07-11 PROCEDURE — 80061 LIPID PANEL: CPT

## 2024-07-11 PROCEDURE — G0103 PSA SCREENING: HCPCS

## 2024-07-16 ENCOUNTER — TELEPHONE (OUTPATIENT)
Dept: PRIMARY CARE | Facility: CLINIC | Age: 74
End: 2024-07-16
Payer: MEDICARE

## 2024-07-16 NOTE — TELEPHONE ENCOUNTER
----- Message from Helio Sparks sent at 7/11/2024  5:39 PM EDT -----  Labs look good  ----- Message -----  From: Lab, Background User  Sent: 7/11/2024   4:04 PM EDT  To: Helio Sparks MD

## 2024-07-31 ENCOUNTER — PATIENT OUTREACH (OUTPATIENT)
Dept: PRIMARY CARE | Facility: CLINIC | Age: 74
End: 2024-07-31
Payer: MEDICARE

## 2024-08-06 ENCOUNTER — OFFICE VISIT (OUTPATIENT)
Dept: CARDIOLOGY | Facility: HOSPITAL | Age: 74
End: 2024-08-06
Payer: MEDICARE

## 2024-08-06 VITALS
BODY MASS INDEX: 22.45 KG/M2 | SYSTOLIC BLOOD PRESSURE: 99 MMHG | OXYGEN SATURATION: 95 % | HEIGHT: 69 IN | WEIGHT: 151.6 LBS | DIASTOLIC BLOOD PRESSURE: 62 MMHG | HEART RATE: 81 BPM

## 2024-08-06 DIAGNOSIS — I50.20 HFREF (HEART FAILURE WITH REDUCED EJECTION FRACTION) (MULTI): Primary | Chronic | ICD-10-CM

## 2024-08-06 DIAGNOSIS — I10 BENIGN ESSENTIAL HYPERTENSION: Chronic | ICD-10-CM

## 2024-08-06 DIAGNOSIS — I25.10 CORONARY ARTERY DISEASE INVOLVING NATIVE HEART WITHOUT ANGINA PECTORIS, UNSPECIFIED VESSEL OR LESION TYPE: Chronic | ICD-10-CM

## 2024-08-06 PROCEDURE — 3074F SYST BP LT 130 MM HG: CPT | Performed by: NURSE PRACTITIONER

## 2024-08-06 PROCEDURE — 1157F ADVNC CARE PLAN IN RCRD: CPT | Performed by: NURSE PRACTITIONER

## 2024-08-06 PROCEDURE — 1036F TOBACCO NON-USER: CPT | Performed by: NURSE PRACTITIONER

## 2024-08-06 PROCEDURE — 1159F MED LIST DOCD IN RCRD: CPT | Performed by: NURSE PRACTITIONER

## 2024-08-06 PROCEDURE — 1160F RVW MEDS BY RX/DR IN RCRD: CPT | Performed by: NURSE PRACTITIONER

## 2024-08-06 PROCEDURE — 3078F DIAST BP <80 MM HG: CPT | Performed by: NURSE PRACTITIONER

## 2024-08-06 PROCEDURE — 99214 OFFICE O/P EST MOD 30 MIN: CPT | Performed by: NURSE PRACTITIONER

## 2024-08-06 RX ORDER — CARVEDILOL 3.12 MG/1
3.12 TABLET ORAL
Qty: 180 TABLET | Refills: 3 | Status: SHIPPED | OUTPATIENT
Start: 2024-08-06 | End: 2025-08-06

## 2024-08-06 RX ORDER — LISINOPRIL 10 MG/1
10 TABLET ORAL DAILY
Qty: 90 TABLET | Refills: 3 | Status: SHIPPED | OUTPATIENT
Start: 2024-08-06 | End: 2025-08-06

## 2024-08-06 RX ORDER — CLOPIDOGREL BISULFATE 75 MG/1
75 TABLET ORAL DAILY
Qty: 90 TABLET | Refills: 3 | Status: SHIPPED | OUTPATIENT
Start: 2024-08-06

## 2024-08-06 NOTE — PATIENT INSTRUCTIONS
Stop entresto and jardiance    Start carvedilol 3.125 mg twice daily     Start lisinopril 10 mg daily     Follow up in 6 weeks     Call for any concerns

## 2024-08-06 NOTE — PROGRESS NOTES
Subjective   Wojciech Kern is a 74 y.o. male.    Chief Complaint:  Coronary Artery Disease, Hyperlipidemia, Heart Murmur, Heart Failure, and Hypertension    Mr. Kern returns for a routine 3 month follow up. He has been feeling well from a cardiac standpoint. He has not been taking jardiance due to the cost, and has also only been taking entresto once daily to make it last longer. He unfortunately does not qualify for any assistance through our speciality pharmacy due to his income. He is feeling well, denying any exertional chest pain or shortness of breath. He offers no new cardiovascular complaints or concerns today. He denies any complaints of chest pain, shortness of breath, lightheadedness, dizziness, palpitations, syncope, orthopnea, paroxysmal nocturnal dyspnea, lower extremity swelling or bleeding concerns.        Review of Systems   All other systems reviewed and are negative.      Objective   Physical Exam  Constitutional:       Appearance: Healthy appearance. In no distress  Pulmonary:      Effort: Pulmonary effort is normal.      Breath sounds: Normal breath sounds.   Cardiovascular:      Normal rate. Regular rhythm. Normal S1. Normal S2.       Murmurs: There is no murmur.      Carotids: right carotid pulse +2, no bruit heard over the right carotid. left carotid pulse +2, no bruit heard over the left carotid.  Pacemaker/ICD Implant site has healed without signs of infection.  Edema:     Peripheral edema absent.   Abdominal:      Palpations: Abdomen is soft.   Musculoskeletal:       Cervical back: Normal range of motion.   Skin:     General: Skin is warm and dry. Normal color and pigmentation   Neurological:      Mental Status: Alert and oriented to person, place and time.   Psychiatric:     Mood and Affect: appropriate mood and appropriate affect.      Lab Review:   Lab Results   Component Value Date     (L) 05/07/2024    K 4.6 05/07/2024    CL 99 05/07/2024    CO2 28 05/07/2024    BUN 61 (H)  05/07/2024    CREATININE 2.13 (H) 05/07/2024    GLUCOSE 100 (H) 05/07/2024    CALCIUM 8.8 05/07/2024     Lab Results   Component Value Date    WBC 7.0 02/21/2024    HGB 11.4 (L) 02/21/2024    HCT 34.3 (L) 02/21/2024    MCV 94 02/21/2024     02/21/2024     Lab Results   Component Value Date    CHOL 111 07/11/2024    TRIG 79 07/11/2024    HDL 48.1 07/11/2024       Assessment/Plan   Mr. 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 Echocardiogram 11/2023 showed an EF of 25-30% with mildly reduced RV systolic function and moderate functional MR. Heart catheterization 9/2022 showed moderate LM disease similar to prior angiography, patent RCA stents to level of RPDA, subintimal stent in RPL occluded, and patent SVG-LAD. He presents today for routine follow up stable from a cardiac standpoint. His VS remain stable. His weight has remained stable around 150 lbs. He unfortunately has not been taking some of his medications due to the cost, and does not qualify for any assistance based on his income. Unfortunately I will have to discontinue jardiance and entresto. I will have him start carvedilol 3.125 mg twice daily and lisinopril 10 mg daily. He will follow up with us in clinic in one month for a BP check. I will also recheck some blood work at that time. He knows to call for any concerns.

## 2024-08-14 ENCOUNTER — APPOINTMENT (OUTPATIENT)
Dept: PHYSICAL THERAPY | Facility: CLINIC | Age: 74
End: 2024-08-14
Payer: MEDICARE

## 2024-08-19 ENCOUNTER — APPOINTMENT (OUTPATIENT)
Dept: OTOLARYNGOLOGY | Facility: CLINIC | Age: 74
End: 2024-08-19
Payer: MEDICARE

## 2024-08-20 ENCOUNTER — PATIENT OUTREACH (OUTPATIENT)
Dept: PRIMARY CARE | Facility: CLINIC | Age: 74
End: 2024-08-20
Payer: MEDICARE

## 2024-08-20 DIAGNOSIS — I50.20 HFREF (HEART FAILURE WITH REDUCED EJECTION FRACTION) (MULTI): ICD-10-CM

## 2024-08-20 DIAGNOSIS — I10 BENIGN ESSENTIAL HYPERTENSION: ICD-10-CM

## 2024-08-20 NOTE — PROGRESS NOTES
Chart Review Completed Prior to Patient Outreach     Pt met with his cardiologist recently and it was decided that his prescriptions for Entresto and Jardiance would be discontinued as pt reports that he cannot afford them. He did work with a pharmacist to determine if he is eligible for any pt assistance programs to help with the cost of these medications and after submitting financial documents, he was informed that he is not eligible for any pt assistance programs. He was prescribed carvedilol 3.125 mg twice daily and lisinopril 10 mg daily.     He states that he has no dizziness or weakness and he is not SOB unless he is exerting himself. He has been checking his BP's and his most recent reading was 105/60. He states that his Systolic BP's typically range between  mm/Hg and his Diastolic BP's are frequently 50-60 mm/Hg. He continues to check his wt daily.     He states that he received a letter from the device clinic that he was assigned to for his AICD because he never kept the appointments that were scheduled for him. I informed him that he would need to be re-connected with a device clinic and that I would assist him. I scheduled an appointment for him at the Cardiac Device Clinic at [facility] for 9/13/24 at 3 pm. He denies any other needs at this time.

## 2024-09-03 ENCOUNTER — OFFICE VISIT (OUTPATIENT)
Dept: CARDIOLOGY | Facility: HOSPITAL | Age: 74
End: 2024-09-03
Payer: MEDICARE

## 2024-09-03 VITALS
HEART RATE: 53 BPM | WEIGHT: 153.2 LBS | BODY MASS INDEX: 22.69 KG/M2 | HEIGHT: 69 IN | SYSTOLIC BLOOD PRESSURE: 112 MMHG | DIASTOLIC BLOOD PRESSURE: 53 MMHG | OXYGEN SATURATION: 95 %

## 2024-09-03 DIAGNOSIS — I50.20 HFREF (HEART FAILURE WITH REDUCED EJECTION FRACTION) (MULTI): Chronic | ICD-10-CM

## 2024-09-03 DIAGNOSIS — I25.10 CORONARY ARTERY DISEASE INVOLVING NATIVE HEART WITHOUT ANGINA PECTORIS, UNSPECIFIED VESSEL OR LESION TYPE: Primary | Chronic | ICD-10-CM

## 2024-09-03 PROCEDURE — 1159F MED LIST DOCD IN RCRD: CPT | Performed by: NURSE PRACTITIONER

## 2024-09-03 PROCEDURE — 1036F TOBACCO NON-USER: CPT | Performed by: NURSE PRACTITIONER

## 2024-09-03 PROCEDURE — 1157F ADVNC CARE PLAN IN RCRD: CPT | Performed by: NURSE PRACTITIONER

## 2024-09-03 PROCEDURE — 99213 OFFICE O/P EST LOW 20 MIN: CPT | Performed by: NURSE PRACTITIONER

## 2024-09-03 PROCEDURE — 3078F DIAST BP <80 MM HG: CPT | Performed by: NURSE PRACTITIONER

## 2024-09-03 PROCEDURE — 3074F SYST BP LT 130 MM HG: CPT | Performed by: NURSE PRACTITIONER

## 2024-09-03 PROCEDURE — 1160F RVW MEDS BY RX/DR IN RCRD: CPT | Performed by: NURSE PRACTITIONER

## 2024-09-03 RX ORDER — ROSUVASTATIN CALCIUM 20 MG/1
20 TABLET, COATED ORAL DAILY
Qty: 90 TABLET | Refills: 3 | Status: SHIPPED | OUTPATIENT
Start: 2024-09-03

## 2024-09-03 NOTE — PROGRESS NOTES
Subjective   Wojciech Kern is a 74 y.o. male.    Chief Complaint:  Coronary Artery Disease and Cardiomyopathy    Mr. Kern returns for a routine one month follow up. He is here for a BP check. He has tolerated the initiation of carvedilol and lisinopril. He monitors his BP at home, which seems to be staying well controlled. His biggest complaint today is neuropathy. He offers no new cardiovascular complaints or concerns today. He denies any complaints of chest pain, shortness of breath, lightheadedness, dizziness, palpitations, syncope, orthopnea, paroxysmal nocturnal dyspnea, lower extremity swelling or bleeding concerns.      Coronary Artery Disease        Review of Systems   All other systems reviewed and are negative.      Objective   Physical Exam  Constitutional:       Appearance: Healthy appearance. In no distress  Pulmonary:      Effort: Pulmonary effort is normal.      Breath sounds: Normal breath sounds.   Cardiovascular:      Normal rate. Regular rhythm. Normal S1. Normal S2.       Murmurs: There is no murmur.      Carotids: right carotid pulse +2, no bruit heard over the right carotid. left carotid pulse +2, no bruit heard over the left carotid.  Pacemaker/ICD Implant site has healed without signs of infection.  Edema:     Peripheral edema absent.   Abdominal:      Palpations: Abdomen is soft.   Musculoskeletal:       Cervical back: Normal range of motion.   Skin:     General: Skin is warm and dry. Normal color and pigmentation   Neurological:      Mental Status: Alert and oriented to person, place and time.   Psychiatric:     Mood and Affect: appropriate mood and appropriate affect.       Lab Review:   Lab Results   Component Value Date     (L) 05/07/2024    K 4.6 05/07/2024    CL 99 05/07/2024    CO2 28 05/07/2024    BUN 61 (H) 05/07/2024    CREATININE 2.13 (H) 05/07/2024    GLUCOSE 100 (H) 05/07/2024    CALCIUM 8.8 05/07/2024     Lab Results   Component Value Date    WBC 7.0 02/21/2024    HGB  11.4 (L) 02/21/2024    HCT 34.3 (L) 02/21/2024    MCV 94 02/21/2024     02/21/2024     Lab Results   Component Value Date    CHOL 111 07/11/2024    TRIG 79 07/11/2024    HDL 48.1 07/11/2024       Assessment/Plan   Mr. 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 Echocardiogram 11/2023 showed an EF of 25-30% with mildly reduced RV systolic function and moderate functional MR. Heart catheterization 9/2022 showed moderate LM disease similar to prior angiography, patent RCA stents to level of RPDA, subintimal stent in RPL occluded, and patent SVG-LAD. He presents today for routine follow up stable from a cardiac standpoint. His HR and BP remain well controlled since starting carvedilol and lisinopril (stopped entresto due to cost). His weight has remained stable around 150 lbs. I will have him continue all medications unchanged. He will follow up with us in clinic in 4 months. He will be seeing his PCP in the near future and is interested in gabapentin for neuropathy, which seems to be a lot more bothersome lately. He knows to call for any concerns.

## 2024-09-09 ENCOUNTER — APPOINTMENT (OUTPATIENT)
Dept: PRIMARY CARE | Facility: CLINIC | Age: 74
End: 2024-09-09
Payer: MEDICARE

## 2024-09-09 ENCOUNTER — LAB (OUTPATIENT)
Dept: LAB | Facility: LAB | Age: 74
End: 2024-09-09
Payer: MEDICARE

## 2024-09-09 VITALS
BODY MASS INDEX: 22.51 KG/M2 | HEIGHT: 69 IN | SYSTOLIC BLOOD PRESSURE: 122 MMHG | WEIGHT: 152 LBS | DIASTOLIC BLOOD PRESSURE: 74 MMHG | RESPIRATION RATE: 12 BRPM | OXYGEN SATURATION: 96 % | HEART RATE: 46 BPM

## 2024-09-09 DIAGNOSIS — R35.1 BPH ASSOCIATED WITH NOCTURIA: ICD-10-CM

## 2024-09-09 DIAGNOSIS — I34.0 NON-RHEUMATIC MITRAL REGURGITATION: ICD-10-CM

## 2024-09-09 DIAGNOSIS — I50.20 HFREF (HEART FAILURE WITH REDUCED EJECTION FRACTION) (MULTI): Chronic | ICD-10-CM

## 2024-09-09 DIAGNOSIS — N18.32 CHRONIC RENAL IMPAIRMENT, STAGE 3B (MULTI): ICD-10-CM

## 2024-09-09 DIAGNOSIS — E78.5 DYSLIPIDEMIA: ICD-10-CM

## 2024-09-09 DIAGNOSIS — R82.90 ABNORMAL URINE: ICD-10-CM

## 2024-09-09 DIAGNOSIS — Z12.11 SCREEN FOR COLON CANCER: ICD-10-CM

## 2024-09-09 DIAGNOSIS — N40.1 BPH ASSOCIATED WITH NOCTURIA: ICD-10-CM

## 2024-09-09 DIAGNOSIS — J43.9 PULMONARY EMPHYSEMA, UNSPECIFIED EMPHYSEMA TYPE (MULTI): Chronic | ICD-10-CM

## 2024-09-09 DIAGNOSIS — D64.9 ANEMIA, UNSPECIFIED TYPE: ICD-10-CM

## 2024-09-09 DIAGNOSIS — G60.9 IDIOPATHIC PERIPHERAL NEUROPATHY: ICD-10-CM

## 2024-09-09 DIAGNOSIS — K25.9 GASTRIC ULCER, UNSPECIFIED CHRONICITY, UNSPECIFIED WHETHER GASTRIC ULCER HEMORRHAGE OR PERFORATION PRESENT: ICD-10-CM

## 2024-09-09 DIAGNOSIS — I10 BENIGN ESSENTIAL HYPERTENSION: Chronic | ICD-10-CM

## 2024-09-09 DIAGNOSIS — K21.9 GASTROESOPHAGEAL REFLUX DISEASE, UNSPECIFIED WHETHER ESOPHAGITIS PRESENT: ICD-10-CM

## 2024-09-09 DIAGNOSIS — Z95.810 AICD (AUTOMATIC CARDIOVERTER/DEFIBRILLATOR) PRESENT: Chronic | ICD-10-CM

## 2024-09-09 DIAGNOSIS — I25.10 CORONARY ARTERY DISEASE INVOLVING NATIVE HEART WITHOUT ANGINA PECTORIS, UNSPECIFIED VESSEL OR LESION TYPE: Chronic | ICD-10-CM

## 2024-09-09 DIAGNOSIS — Z00.00 HEALTHCARE MAINTENANCE: Primary | ICD-10-CM

## 2024-09-09 DIAGNOSIS — I21.4 NON-ST ELEVATION MYOCARDIAL INFARCTION (NSTEMI) IN RECOVERY PHASE (MULTI): ICD-10-CM

## 2024-09-09 LAB
ALBUMIN SERPL BCP-MCNC: 4.6 G/DL (ref 3.4–5)
ALP SERPL-CCNC: 63 U/L (ref 33–136)
ALT SERPL W P-5'-P-CCNC: 15 U/L (ref 10–52)
ANION GAP SERPL CALC-SCNC: 14 MMOL/L (ref 10–20)
AST SERPL W P-5'-P-CCNC: 15 U/L (ref 9–39)
BILIRUB SERPL-MCNC: 0.7 MG/DL (ref 0–1.2)
BUN SERPL-MCNC: 76 MG/DL (ref 6–23)
CALCIUM SERPL-MCNC: 10 MG/DL (ref 8.6–10.6)
CHLORIDE SERPL-SCNC: 98 MMOL/L (ref 98–107)
CO2 SERPL-SCNC: 32 MMOL/L (ref 21–32)
CREAT SERPL-MCNC: 2.5 MG/DL (ref 0.5–1.3)
EGFRCR SERPLBLD CKD-EPI 2021: 26 ML/MIN/1.73M*2
GLUCOSE SERPL-MCNC: 81 MG/DL (ref 74–99)
POTASSIUM SERPL-SCNC: 5.1 MMOL/L (ref 3.5–5.3)
PROT SERPL-MCNC: 8.6 G/DL (ref 6.4–8.2)
SODIUM SERPL-SCNC: 139 MMOL/L (ref 136–145)

## 2024-09-09 PROCEDURE — 1157F ADVNC CARE PLAN IN RCRD: CPT | Performed by: FAMILY MEDICINE

## 2024-09-09 PROCEDURE — G0439 PPPS, SUBSEQ VISIT: HCPCS | Performed by: FAMILY MEDICINE

## 2024-09-09 PROCEDURE — 1160F RVW MEDS BY RX/DR IN RCRD: CPT | Performed by: FAMILY MEDICINE

## 2024-09-09 PROCEDURE — 36415 COLL VENOUS BLD VENIPUNCTURE: CPT

## 2024-09-09 PROCEDURE — 1159F MED LIST DOCD IN RCRD: CPT | Performed by: FAMILY MEDICINE

## 2024-09-09 PROCEDURE — 1170F FXNL STATUS ASSESSED: CPT | Performed by: FAMILY MEDICINE

## 2024-09-09 PROCEDURE — 3074F SYST BP LT 130 MM HG: CPT | Performed by: FAMILY MEDICINE

## 2024-09-09 PROCEDURE — 80053 COMPREHEN METABOLIC PANEL: CPT

## 2024-09-09 PROCEDURE — 3078F DIAST BP <80 MM HG: CPT | Performed by: FAMILY MEDICINE

## 2024-09-09 PROCEDURE — 99214 OFFICE O/P EST MOD 30 MIN: CPT | Performed by: FAMILY MEDICINE

## 2024-09-09 PROCEDURE — 1123F ACP DISCUSS/DSCN MKR DOCD: CPT | Performed by: FAMILY MEDICINE

## 2024-09-09 PROCEDURE — 1036F TOBACCO NON-USER: CPT | Performed by: FAMILY MEDICINE

## 2024-09-09 RX ORDER — GABAPENTIN 100 MG/1
100 CAPSULE ORAL NIGHTLY
Qty: 90 CAPSULE | Refills: 1 | Status: SHIPPED | OUTPATIENT
Start: 2024-09-09

## 2024-09-09 RX ORDER — OMEPRAZOLE 20 MG/1
20 TABLET, DELAYED RELEASE ORAL DAILY
COMMUNITY
Start: 2024-09-09 | End: 2025-09-09

## 2024-09-09 ASSESSMENT — ACTIVITIES OF DAILY LIVING (ADL)
BATHING: INDEPENDENT
DRESSING: INDEPENDENT
GROCERY_SHOPPING: INDEPENDENT
MANAGING_FINANCES: INDEPENDENT
TAKING_MEDICATION: INDEPENDENT
DOING_HOUSEWORK: INDEPENDENT

## 2024-09-09 ASSESSMENT — ENCOUNTER SYMPTOMS
DIARRHEA: 0
CHEST TIGHTNESS: 0
ARTHRALGIAS: 0
BACK PAIN: 0
NERVOUS/ANXIOUS: 0
LOSS OF SENSATION IN FEET: 0
WEAKNESS: 0
DIZZINESS: 0
FATIGUE: 0
ADENOPATHY: 0
ABDOMINAL PAIN: 0
SORE THROAT: 0
FEVER: 0
APPETITE CHANGE: 0
BRUISES/BLEEDS EASILY: 0
COUGH: 0
EYE REDNESS: 0
SHORTNESS OF BREATH: 0
OCCASIONAL FEELINGS OF UNSTEADINESS: 0
BLOOD IN STOOL: 0
DIFFICULTY URINATING: 0
EYE PAIN: 0
CHILLS: 0
HEADACHES: 0
DYSPHORIC MOOD: 0
CONSTIPATION: 0
DYSURIA: 0
DEPRESSION: 0
ABDOMINAL DISTENTION: 0

## 2024-09-09 ASSESSMENT — PATIENT HEALTH QUESTIONNAIRE - PHQ9
SUM OF ALL RESPONSES TO PHQ9 QUESTIONS 1 AND 2: 0
2. FEELING DOWN, DEPRESSED OR HOPELESS: NOT AT ALL
1. LITTLE INTEREST OR PLEASURE IN DOING THINGS: NOT AT ALL

## 2024-09-09 NOTE — PATIENT INSTRUCTIONS

## 2024-09-09 NOTE — PROGRESS NOTES
Subjective   Patient ID: Wojciech Kern is a 74 y.o. male who presents for Medicare Annual Wellness Visit Subsequent.  PMHX, PSHx, Fam hx, and Social hx reviewed.   New concerns none  Vaccines - Shingrix vaccines recommended  Dentist seen at least yearly yes   Vision concerns none  Hearing concerns none, aids help  Diet is overall healthy.   Smoker - no  Lung CT/screening - NA  AAA screening - NA  Alcohol use - 0-1 drink per week  Exercising 7 days per week.   Colonoscopy 2018, due  ACP - advance directives, code status, and DPOA documentation discussed         Pt has chronic stable CAD, AICD, CMP, LV aneurysm, hx MI, Evelyn Regurgitation, and HTN. Sees cardiology.  Pt is taking Aldactone, Coreg, and Lisinopril. Tolerating well.  Exercising 7 days per week, walking  Low sodium diet is being followed.   Is monitoring home blood pressures. Readings range 120s/70.  Denies HA, vision changes or CP.     Pt has Dyslipidemia.   Lipid panel showed LDL 47.  Currently taking Rosuvastatin and is tolerating well without muscle pains or weakness.     GERD is well controlled off PPI but he has prior hx of PUD . Denies epigastric pain, nausea, heartburn or water brash.     Pt has stable CRI.  GFR 31 in May. BP is controlled and pt is following low sodium diet.         Review of Systems   Constitutional:  Negative for appetite change, chills, fatigue and fever.   HENT:  Negative for congestion, hearing loss and sore throat.    Eyes:  Negative for pain, redness and visual disturbance.   Respiratory:  Negative for cough, chest tightness and shortness of breath.    Cardiovascular:  Negative for chest pain and leg swelling.   Gastrointestinal:  Negative for abdominal distention, abdominal pain, blood in stool, constipation and diarrhea.   Genitourinary:  Negative for difficulty urinating and dysuria.   Musculoskeletal:  Negative for arthralgias and back pain.   Skin:  Negative for rash.   Neurological:  Negative for dizziness, weakness  "and headaches.   Hematological:  Negative for adenopathy. Does not bruise/bleed easily.   Psychiatric/Behavioral:  Negative for dysphoric mood. The patient is not nervous/anxious.        Objective   /74   Pulse (!) 46   Resp 12   Ht 1.753 m (5' 9\")   Wt 68.9 kg (152 lb)   SpO2 96%   BMI 22.45 kg/m²    Physical Exam  Constitutional:       General: He is not in acute distress.     Appearance: Normal appearance. He is not ill-appearing.   HENT:      Head: Normocephalic and atraumatic.      Right Ear: Tympanic membrane, ear canal and external ear normal.      Left Ear: Tympanic membrane, ear canal and external ear normal.      Nose: Nose normal.      Mouth/Throat:      Mouth: Mucous membranes are moist.      Pharynx: No oropharyngeal exudate or posterior oropharyngeal erythema.   Eyes:      Extraocular Movements: Extraocular movements intact.      Conjunctiva/sclera: Conjunctivae normal.      Pupils: Pupils are equal, round, and reactive to light.   Neck:      Thyroid: No thyroid mass or thyromegaly.      Vascular: No carotid bruit.   Cardiovascular:      Rate and Rhythm: Regular rhythm. Bradycardia present.      Heart sounds: No murmur heard.  Pulmonary:      Breath sounds: Normal breath sounds. No wheezing, rhonchi or rales.   Abdominal:      General: Bowel sounds are normal. There is no distension.      Palpations: Abdomen is soft. There is no mass.      Tenderness: There is no abdominal tenderness.   Musculoskeletal:         General: No swelling or deformity.      Cervical back: Neck supple. No tenderness.   Lymphadenopathy:      Cervical: No cervical adenopathy.   Skin:     General: Skin is warm and dry.      Findings: No lesion or rash.   Neurological:      Mental Status: He is alert and oriented to person, place, and time.      Sensory: No sensory deficit.      Motor: No weakness.      Coordination: Coordination normal.      Deep Tendon Reflexes: Reflexes normal.   Psychiatric:         Mood and " Affect: Mood normal.         Behavior: Behavior normal.         Judgment: Judgment normal.     Medicare Wellness Billing Compliance Satisfied    *This is a visual tool to show completion of required items on the day of the visit. Green checks will only appear on the date of visit.    Review all medications by prescribing practitioner or clinical pharmacist (such as prescriptions, OTCs, herbal therapies and supplements) documented in the medical record    Past Medical, Surgical, and Family History reviewed and updated in chart    Tobacco Use Reviewed    Alcohol Use Reviewed    Illicit Drug Use Reviewed    PHQ2/9    Falls in Last Year Reviewed    Home Safety Risk Factors Reviewed    Cognitive Impairment Reviewed    Patient Self Assessment and Health Status    Current Diet Reviewed    Exercise Frequency    ADL - Hearing Impairment    ADL - Bathing    ADL - Dressing    ADL - Walks in Home    IADL - Managing Finances    IADL - Grocery Shopping    IADL - Taking Medications    IADL - Doing Housework          Assessment/Plan   Diagnoses and all orders for this visit:  Healthcare maintenance - Shingles vaccines recommended at pharmacy. Recheck fasting labs. Colonoscopy ordered.  Coronary artery disease /AICD (automatic cardioverter/defibrillator) /HFrEF (heart failure with reduced ejection fraction)/Non-rheumatic mitral regurgitation/Non-ST elevation myocardial infarction (NSTEMI) in recovery phase - asymptomatic, HR low in office today, monitoring at home. If running  <50 beats per min should call cardiology  Benign essential hypertension - stable, continue current meds and moniotr  Dyslipidemia - doing well on Rosuvastatin  Gastroesophageal reflux disease/hx Gastric ulcer- restart low dose Omeprazole daily  BPH associated with nocturia - stable, monitro  Chronic renal impairment, stage 3b - referring to nephrology  Pulmonary emphysema  - stable on Spiriva per pulmonary  Neuropathy - start low dose  Gabapentin before bedtime, call in 3 weeks if needing to increase dose.  Unsteady gait / falls - recommend physical therapy for gait training    Follow up in 6 months, 30mins

## 2024-09-09 NOTE — PROGRESS NOTES
"Subjective   Reason for Visit: Wojciech Kern is an 74 y.o. male here for a Medicare Wellness visit.     Past Medical, Surgical, and Family History reviewed and updated in chart.    Reviewed all medications by prescribing practitioner or clinical pharmacist (such as prescriptions, OTCs, herbal therapies and supplements) and documented in the medical record.    HPI    Patient Care Team:  Helio Sparks MD as PCP - General  Helio Sparks MD as PCP - [employer] PCP  Yuliet Das RN as Care Manager (Case Management)     Review of Systems    Objective   Vitals:  /74   Pulse (!) 46   Resp 12   Ht 1.753 m (5' 9\")   Wt 68.9 kg (152 lb)   SpO2 96%   BMI 22.45 kg/m²       Physical Exam    Assessment & Plan              "

## 2024-09-10 ENCOUNTER — LAB (OUTPATIENT)
Dept: LAB | Facility: LAB | Age: 74
End: 2024-09-10
Payer: MEDICARE

## 2024-09-10 DIAGNOSIS — R82.90 ABNORMAL URINE: ICD-10-CM

## 2024-09-10 DIAGNOSIS — I10 BENIGN ESSENTIAL HYPERTENSION: ICD-10-CM

## 2024-09-10 DIAGNOSIS — E78.5 DYSLIPIDEMIA: ICD-10-CM

## 2024-09-10 LAB
APPEARANCE UR: CLEAR
BILIRUB UR STRIP.AUTO-MCNC: NEGATIVE MG/DL
COLOR UR: NORMAL
GLUCOSE UR STRIP.AUTO-MCNC: NORMAL MG/DL
HOLD SPECIMEN: NORMAL
KETONES UR STRIP.AUTO-MCNC: NEGATIVE MG/DL
LEUKOCYTE ESTERASE UR QL STRIP.AUTO: NEGATIVE
NITRITE UR QL STRIP.AUTO: NEGATIVE
PH UR STRIP.AUTO: 6 [PH]
PROT UR STRIP.AUTO-MCNC: NEGATIVE MG/DL
RBC # UR STRIP.AUTO: NEGATIVE /UL
SP GR UR STRIP.AUTO: 1.01
UROBILINOGEN UR STRIP.AUTO-MCNC: NORMAL MG/DL

## 2024-09-10 PROCEDURE — 81003 URINALYSIS AUTO W/O SCOPE: CPT

## 2024-09-13 ENCOUNTER — HOSPITAL ENCOUNTER (OUTPATIENT)
Dept: CARDIOLOGY | Facility: HOSPITAL | Age: 74
Discharge: HOME | End: 2024-09-13
Payer: MEDICARE

## 2024-09-13 ENCOUNTER — OFFICE VISIT (OUTPATIENT)
Dept: URGENT CARE | Age: 74
End: 2024-09-13
Payer: MEDICARE

## 2024-09-13 VITALS
RESPIRATION RATE: 20 BRPM | HEART RATE: 63 BPM | TEMPERATURE: 98.4 F | SYSTOLIC BLOOD PRESSURE: 98 MMHG | OXYGEN SATURATION: 96 % | DIASTOLIC BLOOD PRESSURE: 58 MMHG

## 2024-09-13 DIAGNOSIS — Z95.810 PRESENCE OF AUTOMATIC (IMPLANTABLE) CARDIAC DEFIBRILLATOR: ICD-10-CM

## 2024-09-13 DIAGNOSIS — I49.01 VENTRICULAR FIBRILLATION (MULTI): ICD-10-CM

## 2024-09-13 DIAGNOSIS — R42 DIZZINESS: Primary | ICD-10-CM

## 2024-09-13 DIAGNOSIS — R09.81 NASAL CONGESTION: ICD-10-CM

## 2024-09-13 DIAGNOSIS — Z95.810 AICD (AUTOMATIC CARDIOVERTER/DEFIBRILLATOR) PRESENT: Primary | ICD-10-CM

## 2024-09-13 DIAGNOSIS — I50.9 CONGESTIVE HEART FAILURE, UNSPECIFIED HF CHRONICITY, UNSPECIFIED HEART FAILURE TYPE: ICD-10-CM

## 2024-09-13 LAB — POC SARS-COV-2 AG BINAX: NORMAL

## 2024-09-13 PROCEDURE — 93282 PRGRMG EVAL IMPLANTABLE DFB: CPT | Performed by: INTERNAL MEDICINE

## 2024-09-13 PROCEDURE — 93282 PRGRMG EVAL IMPLANTABLE DFB: CPT

## 2024-09-13 RX ORDER — DAPAGLIFLOZIN 10 MG/1
TABLET, FILM COATED ORAL
COMMUNITY
Start: 2023-08-02

## 2024-09-13 ASSESSMENT — ENCOUNTER SYMPTOMS
WEAKNESS: 0
NUMBNESS: 0
LIGHT-HEADEDNESS: 0
FACIAL ASYMMETRY: 0
SEIZURES: 0
RHINORRHEA: 1
SINUS PAIN: 0
HEADACHES: 0
SPEECH DIFFICULTY: 0
TREMORS: 0
SORE THROAT: 1
DIZZINESS: 1
CONSTITUTIONAL NEGATIVE: 1
SINUS PRESSURE: 0
EYES NEGATIVE: 1
CARDIOVASCULAR NEGATIVE: 1
RESPIRATORY NEGATIVE: 1

## 2024-09-13 NOTE — PROGRESS NOTES
Subjective   Patient ID: Wojciech Kern is a 74 y.o. male. They present today with a chief complaint of Dizziness (Patient here for dizziness.).    History of Present Illness  Patient presents with dizziness and nasal congestion. Requests being tested for COVID. States his wife had COVID approximately 1 month ago. His blood pressure is low today, but he states it usually does run low. He did accidentally take an extra gabapentin last night. Since it's the dizziness started after that. He refuses an EKG and transport to the hospital. He strictly wants a COVID test.     Past Medical History  Allergies as of 09/13/2024 - Reviewed 09/13/2024   Allergen Reaction Noted    Penicillins Anaphylaxis and Hives 04/06/2023    Sulfa (sulfonamide antibiotics) Hives 04/06/2023       (Not in a hospital admission)       Past Medical History:   Diagnosis Date    AICD (automatic cardioverter/defibrillator) present 04/06/2023    Atherosclerotic heart disease of native coronary artery without angina pectoris 10/28/2015    CHF (congestive heart failure) (Multi)     Chronic kidney disease, stage 3 unspecified (Multi) 12/19/2019    Chronic renal failure (CRF), stage 4 (severe) (Multi) 03/22/2024    Community acquired pneumonia 04/06/2023    Cortical age-related cataract, left eye 03/31/2016    Dysphagia, unspecified 08/20/2015    Dysuria 04/06/2023    Emphysema/COPD (Multi) 04/06/2023    Essential (primary) hypertension 02/26/2018    Gastric ulcer 04/06/2023    -H pylori     Gastritis 04/06/2023    Hemoptysis 05/17/2016    HFrEF (heart failure with reduced ejection fraction) (Multi) 04/06/2023    Kidney stone on left side 04/06/2023    Melena 04/06/2023    NUD (nonulcer dyspepsia) 04/06/2023    Personal history of peptic ulcer disease 05/23/2019    Personal history of pneumonia (recurrent) 03/07/2016    Prurigo nodularis 04/06/2023    Stage 3a chronic kidney disease (Multi) 04/06/2023    Urinary retention 04/06/2023       Past Surgical  History:   Procedure Laterality Date    CATARACT EXTRACTION Bilateral 03/26/2016    Cataract Surgery    CORONARY ARTERY BYPASS GRAFT  02/26/2018    CABG        reports that he quit smoking about 26 years ago. His smoking use included cigarettes. He has been exposed to tobacco smoke. He has never used smokeless tobacco. He reports that he does not currently use alcohol. He reports that he does not currently use drugs.    Review of Systems  Review of Systems   Constitutional: Negative.    HENT:  Positive for congestion, ear discharge, rhinorrhea and sore throat. Negative for ear pain, postnasal drip, sinus pressure and sinus pain.    Eyes: Negative.    Respiratory: Negative.     Cardiovascular: Negative.    Neurological:  Positive for dizziness. Negative for tremors, seizures, syncope, facial asymmetry, speech difficulty, weakness, light-headedness, numbness and headaches.         Objective    Vitals:    09/13/24 1647 09/13/24 1648   BP: 105/57 98/58   Pulse: 56 63   Resp: 20    Temp: 36.9 °C (98.4 °F)    SpO2: 96%      No LMP for male patient.    Physical Exam  Neurological:      General: No focal deficit present.      Mental Status: He is alert and oriented to person, place, and time.      Cranial Nerves: Cranial nerves 2-12 are intact.      Sensory: Sensation is intact.      Motor: Motor function is intact.      Gait: Gait is intact.      Deep Tendon Reflexes: Reflexes are normal and symmetric.     CONSTITUTIONAL: The general appearance and condition of the patient were examined.  Level of distress, nutrition, external development abnormality, and general behavior were noted.  No abnormal findings.  Vital signs as documented.      CARDIOVASCULAR: The patient's heart examined for regular rate and rhythm and presence of murmurs. Note taken of any tachycardia, bradycardia or any irregular rhythm.  No abnormal findings.        RESPIRATORY/LUNGS: Chest examined for equal movement, bilaterally.  Lungs examined for  equality of breath sounds. Presence or absence of rales noted bilaterally.  Examined for the presence of diffuse or scattered wheezes.  No abnormal findings.      GI/ABDOMEN: The patient's abdomen was inspected for signs of distention, surgical scars, or ascites.  Bowel sounds were evaluated.  The patient's abdomen was then palpated in each of four quadrants, including palpation for RLQ tenderness, any rebound or tenderness.  No abnormal findings.          Procedures    Point of Care Test & Imaging Results from this visit  Results for orders placed or performed in visit on 09/13/24   POCT Covid-19 Rapid Antigen   Result Value Ref Range    POC MARISA-COV-2 AG  Presumptive negative test for SARS-CoV-2 (no antigen detected)     Presumptive negative test for SARS-CoV-2 (no antigen detected)      No results found.    Diagnostic study results (if any) were reviewed by [facility].    Assessment/Plan   Allergies, medications, history, and pertinent labs/EKGs/Imaging reviewed by SAHNNON Esquivel-CNP.     Medical Decision Making  Patient signed AMA.  Refuses ER and EKG.  States he will follow up with Cardiology.  Patient instructed to return or go to the emergency room for any new or worsening symptoms. Patient verbalized understanding of discharge instructions.  Patient left walking in stable condition.        Orders and Diagnoses  Diagnoses and all orders for this visit:  Dizziness  -     POCT Covid-19 Rapid Antigen  Nasal congestion  -     POCT Covid-19 Rapid Antigen      Medical Admin Record      Follow Up Instructions  No follow-ups on file.    Patient disposition: Home    Electronically signed by [facility]  4:59 PM

## 2024-09-29 ENCOUNTER — HOSPITAL ENCOUNTER (OUTPATIENT)
Facility: HOSPITAL | Age: 74
Setting detail: OBSERVATION
End: 2024-09-29
Attending: EMERGENCY MEDICINE | Admitting: STUDENT IN AN ORGANIZED HEALTH CARE EDUCATION/TRAINING PROGRAM
Payer: MEDICARE

## 2024-09-29 ENCOUNTER — APPOINTMENT (OUTPATIENT)
Dept: CARDIOLOGY | Facility: HOSPITAL | Age: 74
End: 2024-09-29
Payer: MEDICARE

## 2024-09-29 VITALS
DIASTOLIC BLOOD PRESSURE: 82 MMHG | HEIGHT: 69 IN | HEART RATE: 75 BPM | OXYGEN SATURATION: 98 % | TEMPERATURE: 97.9 F | RESPIRATION RATE: 18 BRPM | SYSTOLIC BLOOD PRESSURE: 132 MMHG | BODY MASS INDEX: 21.73 KG/M2 | WEIGHT: 146.7 LBS

## 2024-09-29 DIAGNOSIS — N18.32 CHRONIC RENAL IMPAIRMENT, STAGE 3B (MULTI): ICD-10-CM

## 2024-09-29 DIAGNOSIS — R42 POSTURAL DIZZINESS: ICD-10-CM

## 2024-09-29 DIAGNOSIS — R00.1 SINUS BRADYCARDIA: Primary | ICD-10-CM

## 2024-09-29 LAB
ALBUMIN SERPL BCP-MCNC: 4.3 G/DL (ref 3.4–5)
ALP SERPL-CCNC: 54 U/L (ref 33–136)
ALT SERPL W P-5'-P-CCNC: 14 U/L (ref 10–52)
ANION GAP SERPL CALC-SCNC: 11 MMOL/L (ref 10–20)
AST SERPL W P-5'-P-CCNC: 17 U/L (ref 9–39)
BASOPHILS # BLD AUTO: 0.03 X10*3/UL (ref 0–0.1)
BASOPHILS NFR BLD AUTO: 0.4 %
BILIRUB SERPL-MCNC: 0.6 MG/DL (ref 0–1.2)
BUN SERPL-MCNC: 42 MG/DL (ref 6–23)
CALCIUM SERPL-MCNC: 8.8 MG/DL (ref 8.6–10.3)
CARDIAC TROPONIN I PNL SERPL HS: 14 NG/L (ref 0–20)
CHLORIDE SERPL-SCNC: 101 MMOL/L (ref 98–107)
CO2 SERPL-SCNC: 24 MMOL/L (ref 21–32)
CREAT SERPL-MCNC: 1.77 MG/DL (ref 0.5–1.3)
EGFRCR SERPLBLD CKD-EPI 2021: 39 ML/MIN/1.73M*2
EOSINOPHIL # BLD AUTO: 0.1 X10*3/UL (ref 0–0.4)
EOSINOPHIL NFR BLD AUTO: 1.4 %
ERYTHROCYTE [DISTWIDTH] IN BLOOD BY AUTOMATED COUNT: 13.1 % (ref 11.5–14.5)
GLUCOSE SERPL-MCNC: 90 MG/DL (ref 74–99)
HCT VFR BLD AUTO: 32.2 % (ref 41–52)
HGB BLD-MCNC: 11 G/DL (ref 13.5–17.5)
IMM GRANULOCYTES # BLD AUTO: 0.19 X10*3/UL (ref 0–0.5)
IMM GRANULOCYTES NFR BLD AUTO: 2.6 % (ref 0–0.9)
LYMPHOCYTES # BLD AUTO: 1.18 X10*3/UL (ref 0.8–3)
LYMPHOCYTES NFR BLD AUTO: 16 %
MAGNESIUM SERPL-MCNC: 2.3 MG/DL (ref 1.6–2.4)
MCH RBC QN AUTO: 32.4 PG (ref 26–34)
MCHC RBC AUTO-ENTMCNC: 34.2 G/DL (ref 32–36)
MCV RBC AUTO: 95 FL (ref 80–100)
MONOCYTES # BLD AUTO: 0.62 X10*3/UL (ref 0.05–0.8)
MONOCYTES NFR BLD AUTO: 8.4 %
NEUTROPHILS # BLD AUTO: 5.24 X10*3/UL (ref 1.6–5.5)
NEUTROPHILS NFR BLD AUTO: 71.2 %
NRBC BLD-RTO: 0 /100 WBCS (ref 0–0)
PLATELET # BLD AUTO: 225 X10*3/UL (ref 150–450)
POTASSIUM SERPL-SCNC: 5.1 MMOL/L (ref 3.5–5.3)
PROT SERPL-MCNC: 7.8 G/DL (ref 6.4–8.2)
RBC # BLD AUTO: 3.4 X10*6/UL (ref 4.5–5.9)
SODIUM SERPL-SCNC: 131 MMOL/L (ref 136–145)
TSH SERPL-ACNC: 0.81 MIU/L (ref 0.44–3.98)
WBC # BLD AUTO: 7.4 X10*3/UL (ref 4.4–11.3)

## 2024-09-29 PROCEDURE — 2500000004 HC RX 250 GENERAL PHARMACY W/ HCPCS (ALT 636 FOR OP/ED): Performed by: STUDENT IN AN ORGANIZED HEALTH CARE EDUCATION/TRAINING PROGRAM

## 2024-09-29 PROCEDURE — G0378 HOSPITAL OBSERVATION PER HR: HCPCS

## 2024-09-29 PROCEDURE — 2500000004 HC RX 250 GENERAL PHARMACY W/ HCPCS (ALT 636 FOR OP/ED): Performed by: EMERGENCY MEDICINE

## 2024-09-29 PROCEDURE — 80053 COMPREHEN METABOLIC PANEL: CPT | Performed by: EMERGENCY MEDICINE

## 2024-09-29 PROCEDURE — 84443 ASSAY THYROID STIM HORMONE: CPT | Performed by: INTERNAL MEDICINE

## 2024-09-29 PROCEDURE — 85025 COMPLETE CBC W/AUTO DIFF WBC: CPT | Performed by: EMERGENCY MEDICINE

## 2024-09-29 PROCEDURE — 83735 ASSAY OF MAGNESIUM: CPT | Performed by: EMERGENCY MEDICINE

## 2024-09-29 PROCEDURE — 99221 1ST HOSP IP/OBS SF/LOW 40: CPT | Performed by: STUDENT IN AN ORGANIZED HEALTH CARE EDUCATION/TRAINING PROGRAM

## 2024-09-29 PROCEDURE — 36415 COLL VENOUS BLD VENIPUNCTURE: CPT | Performed by: EMERGENCY MEDICINE

## 2024-09-29 PROCEDURE — 99285 EMERGENCY DEPT VISIT HI MDM: CPT

## 2024-09-29 PROCEDURE — 93005 ELECTROCARDIOGRAM TRACING: CPT

## 2024-09-29 PROCEDURE — 84484 ASSAY OF TROPONIN QUANT: CPT | Performed by: EMERGENCY MEDICINE

## 2024-09-29 RX ORDER — CLOPIDOGREL BISULFATE 75 MG/1
75 TABLET ORAL DAILY
Status: DISCONTINUED | OUTPATIENT
Start: 2024-09-29 | End: 2024-09-30 | Stop reason: HOSPADM

## 2024-09-29 RX ORDER — ASPIRIN 81 MG/1
81 TABLET ORAL DAILY
Status: DISCONTINUED | OUTPATIENT
Start: 2024-09-29 | End: 2024-09-30 | Stop reason: HOSPADM

## 2024-09-29 RX ORDER — ROSUVASTATIN CALCIUM 20 MG/1
20 TABLET, COATED ORAL DAILY
Status: DISCONTINUED | OUTPATIENT
Start: 2024-09-29 | End: 2024-09-30 | Stop reason: HOSPADM

## 2024-09-29 RX ORDER — ENOXAPARIN SODIUM 100 MG/ML
40 INJECTION SUBCUTANEOUS EVERY 24 HOURS
Status: DISCONTINUED | OUTPATIENT
Start: 2024-09-29 | End: 2024-09-29

## 2024-09-29 RX ORDER — ATROPINE SULFATE 0.1 MG/ML
0.4 INJECTION INTRAVENOUS ONCE
Status: COMPLETED | OUTPATIENT
Start: 2024-09-29 | End: 2024-09-29

## 2024-09-29 RX ORDER — SPIRONOLACTONE 25 MG/1
12.5 TABLET ORAL DAILY
Status: DISCONTINUED | OUTPATIENT
Start: 2024-09-29 | End: 2024-09-30 | Stop reason: HOSPADM

## 2024-09-29 RX ORDER — HEPARIN SODIUM 5000 [USP'U]/ML
5000 INJECTION, SOLUTION INTRAVENOUS; SUBCUTANEOUS EVERY 8 HOURS SCHEDULED
Status: DISCONTINUED | OUTPATIENT
Start: 2024-09-30 | End: 2024-09-30 | Stop reason: HOSPADM

## 2024-09-29 RX ORDER — LISINOPRIL 10 MG/1
10 TABLET ORAL DAILY
Status: DISCONTINUED | OUTPATIENT
Start: 2024-09-29 | End: 2024-09-30 | Stop reason: HOSPADM

## 2024-09-29 RX ORDER — PANTOPRAZOLE SODIUM 20 MG/1
20 TABLET, DELAYED RELEASE ORAL
Status: DISCONTINUED | OUTPATIENT
Start: 2024-09-30 | End: 2024-09-30 | Stop reason: HOSPADM

## 2024-09-29 RX ADMIN — ENOXAPARIN SODIUM 40 MG: 40 INJECTION SUBCUTANEOUS at 16:31

## 2024-09-29 RX ADMIN — ATROPINE SULFATE 0.4 MG: 0.1 INJECTION INTRAVENOUS at 16:24

## 2024-09-29 SDOH — SOCIAL STABILITY: SOCIAL INSECURITY: DOES ANYONE TRY TO KEEP YOU FROM HAVING/CONTACTING OTHER FRIENDS OR DOING THINGS OUTSIDE YOUR HOME?: NO

## 2024-09-29 SDOH — SOCIAL STABILITY: SOCIAL INSECURITY
WITHIN THE LAST YEAR, HAVE YOU BEEN KICKED, HIT, SLAPPED, OR OTHERWISE PHYSICALLY HURT BY YOUR PARTNER OR EX-PARTNER?: PATIENT DECLINED

## 2024-09-29 SDOH — SOCIAL STABILITY: SOCIAL INSECURITY: ARE YOU OR HAVE YOU BEEN THREATENED OR ABUSED PHYSICALLY, EMOTIONALLY, OR SEXUALLY BY ANYONE?: NO

## 2024-09-29 SDOH — ECONOMIC STABILITY: FOOD INSECURITY: WITHIN THE PAST 12 MONTHS, YOU WORRIED THAT YOUR FOOD WOULD RUN OUT BEFORE YOU GOT MONEY TO BUY MORE.: PATIENT DECLINED

## 2024-09-29 SDOH — ECONOMIC STABILITY: INCOME INSECURITY: IN THE PAST 12 MONTHS, HAS THE ELECTRIC, GAS, OIL, OR WATER COMPANY THREATENED TO SHUT OFF SERVICE IN YOUR HOME?: NO

## 2024-09-29 SDOH — SOCIAL STABILITY: SOCIAL INSECURITY
WITHIN THE LAST YEAR, HAVE TO BEEN RAPED OR FORCED TO HAVE ANY KIND OF SEXUAL ACTIVITY BY YOUR PARTNER OR EX-PARTNER?: PATIENT DECLINED

## 2024-09-29 SDOH — SOCIAL STABILITY: SOCIAL INSECURITY
WITHIN THE LAST YEAR, HAVE YOU BEEN HUMILIATED OR EMOTIONALLY ABUSED IN OTHER WAYS BY YOUR PARTNER OR EX-PARTNER?: PATIENT DECLINED

## 2024-09-29 SDOH — SOCIAL STABILITY: SOCIAL INSECURITY: HAVE YOU HAD ANY THOUGHTS OF HARMING ANYONE ELSE?: NO

## 2024-09-29 SDOH — SOCIAL STABILITY: SOCIAL INSECURITY: ARE THERE ANY APPARENT SIGNS OF INJURIES/BEHAVIORS THAT COULD BE RELATED TO ABUSE/NEGLECT?: NO

## 2024-09-29 SDOH — SOCIAL STABILITY: SOCIAL INSECURITY: HAVE YOU HAD THOUGHTS OF HARMING ANYONE ELSE?: NO

## 2024-09-29 SDOH — SOCIAL STABILITY: SOCIAL INSECURITY: WERE YOU ABLE TO COMPLETE ALL THE BEHAVIORAL HEALTH SCREENINGS?: YES

## 2024-09-29 SDOH — SOCIAL STABILITY: SOCIAL INSECURITY: DO YOU FEEL UNSAFE GOING BACK TO THE PLACE WHERE YOU ARE LIVING?: NO

## 2024-09-29 SDOH — ECONOMIC STABILITY: FOOD INSECURITY: WITHIN THE PAST 12 MONTHS, THE FOOD YOU BOUGHT JUST DIDN'T LAST AND YOU DIDN'T HAVE MONEY TO GET MORE.: PATIENT DECLINED

## 2024-09-29 SDOH — SOCIAL STABILITY: SOCIAL INSECURITY: ABUSE: ADULT

## 2024-09-29 SDOH — SOCIAL STABILITY: SOCIAL INSECURITY: DO YOU FEEL ANYONE HAS EXPLOITED OR TAKEN ADVANTAGE OF YOU FINANCIALLY OR OF YOUR PERSONAL PROPERTY?: NO

## 2024-09-29 SDOH — SOCIAL STABILITY: SOCIAL INSECURITY: WITHIN THE LAST YEAR, HAVE YOU BEEN AFRAID OF YOUR PARTNER OR EX-PARTNER?: PATIENT DECLINED

## 2024-09-29 SDOH — SOCIAL STABILITY: SOCIAL INSECURITY: HAS ANYONE EVER THREATENED TO HURT YOUR FAMILY OR YOUR PETS?: NO

## 2024-09-29 ASSESSMENT — COGNITIVE AND FUNCTIONAL STATUS - GENERAL
MOBILITY SCORE: 22
MOBILITY SCORE: 20
WALKING IN HOSPITAL ROOM: A LITTLE
CLIMB 3 TO 5 STEPS WITH RAILING: A LITTLE
PATIENT BASELINE BEDBOUND: NO
PERSONAL GROOMING: A LITTLE
STANDING UP FROM CHAIR USING ARMS: A LITTLE
DAILY ACTIVITIY SCORE: 24
MOVING TO AND FROM BED TO CHAIR: A LITTLE
CLIMB 3 TO 5 STEPS WITH RAILING: A LITTLE
WALKING IN HOSPITAL ROOM: A LITTLE
CLIMB 3 TO 5 STEPS WITH RAILING: A LITTLE
MOVING TO AND FROM BED TO CHAIR: A LITTLE
MOVING TO AND FROM BED TO CHAIR: A LITTLE
DAILY ACTIVITIY SCORE: 22
DAILY ACTIVITIY SCORE: 24
MOBILITY SCORE: 20
WALKING IN HOSPITAL ROOM: A LITTLE
STANDING UP FROM CHAIR USING ARMS: A LITTLE
STANDING UP FROM CHAIR USING ARMS: A LITTLE
WALKING IN HOSPITAL ROOM: A LITTLE
TOILETING: A LITTLE
CLIMB 3 TO 5 STEPS WITH RAILING: A LITTLE

## 2024-09-29 ASSESSMENT — LIFESTYLE VARIABLES
HOW OFTEN DO YOU HAVE A DRINK CONTAINING ALCOHOL: 2-4 TIMES A MONTH
SKIP TO QUESTIONS 9-10: 1
AUDIT-C TOTAL SCORE: 2
HOW OFTEN DO YOU HAVE 6 OR MORE DRINKS ON ONE OCCASION: NEVER
AUDIT-C TOTAL SCORE: 2
HOW MANY STANDARD DRINKS CONTAINING ALCOHOL DO YOU HAVE ON A TYPICAL DAY: PATIENT DOES NOT DRINK

## 2024-09-29 ASSESSMENT — PATIENT HEALTH QUESTIONNAIRE - PHQ9
1. LITTLE INTEREST OR PLEASURE IN DOING THINGS: NOT AT ALL
SUM OF ALL RESPONSES TO PHQ9 QUESTIONS 1 & 2: 0
2. FEELING DOWN, DEPRESSED OR HOPELESS: NOT AT ALL

## 2024-09-29 ASSESSMENT — ACTIVITIES OF DAILY LIVING (ADL)
DRESSING YOURSELF: NEEDS ASSISTANCE
HEARING - RIGHT EAR: HEARING AID
ADEQUATE_TO_COMPLETE_ADL: YES
PATIENT'S MEMORY ADEQUATE TO SAFELY COMPLETE DAILY ACTIVITIES?: YES
HEARING - LEFT EAR: HEARING AID
JUDGMENT_ADEQUATE_SAFELY_COMPLETE_DAILY_ACTIVITIES: YES
TOILETING: NEEDS ASSISTANCE
BATHING: NEEDS ASSISTANCE
ASSISTIVE_DEVICE: NONE;CANE
FEEDING YOURSELF: NEEDS ASSISTANCE
GROOMING: NEEDS ASSISTANCE
LACK_OF_TRANSPORTATION: PATIENT DECLINED
WALKS IN HOME: NEEDS ASSISTANCE

## 2024-09-29 ASSESSMENT — PAIN - FUNCTIONAL ASSESSMENT
PAIN_FUNCTIONAL_ASSESSMENT: 0-10
PAIN_FUNCTIONAL_ASSESSMENT: 0-10

## 2024-09-29 ASSESSMENT — PAIN SCALES - GENERAL
PAINLEVEL_OUTOF10: 0 - NO PAIN

## 2024-09-29 NOTE — ED TRIAGE NOTES
Pt arrives to the ED with c/o dizziness and blurred vision. Pt states this began yesterday. He is having difficulty reading things up close. Pt has hx of heart attack. Pt is on aspirin. Pt does follow up with a cardiologist. Pt has CHF and is told to monitor his HR. Hr in triage is 40. Pt has hx of heart surgery and a defibrillator

## 2024-09-29 NOTE — ED PROVIDER NOTES
HPI   Chief Complaint   Patient presents with    Dizziness       74-year-old man history of coronary artery disease status post AICD, CKD, macular degeneration, COPD, HFrEF, kidney stone, peptic ulcer disease here complaining of lightheadedness and difficulty focusing this morning.  Patient notes that he was having trouble seeing his computer screen this morning.  Patient does administer his own medications.  Patient reports his heart rate is normally in the 50s.  He does take carvedilol 3.125 mg twice a day.  He thinks that it is possible that he took extra doses of his medications.  No falls.  No headache.  No fever or chills.  No CP.               Patient History   Past Medical History:   Diagnosis Date    AICD (automatic cardioverter/defibrillator) present 04/06/2023    Atherosclerotic heart disease of native coronary artery without angina pectoris 10/28/2015    CHF (congestive heart failure) (Multi)     Chronic kidney disease, stage 3 unspecified (Multi) 12/19/2019    Chronic renal failure (CRF), stage 4 (severe) (Multi) 03/22/2024    Community acquired pneumonia 04/06/2023    Cortical age-related cataract, left eye 03/31/2016    Dysphagia, unspecified 08/20/2015    Dysuria 04/06/2023    Emphysema/COPD (Multi) 04/06/2023    Essential (primary) hypertension 02/26/2018    Gastric ulcer 04/06/2023    -H pylori     Gastritis 04/06/2023    Hemoptysis 05/17/2016    HFrEF (heart failure with reduced ejection fraction) (Multi) 04/06/2023    Kidney stone on left side 04/06/2023    Melena 04/06/2023    NUD (nonulcer dyspepsia) 04/06/2023    Personal history of peptic ulcer disease 05/23/2019    Personal history of pneumonia (recurrent) 03/07/2016    Prurigo nodularis 04/06/2023    Stage 3a chronic kidney disease (Multi) 04/06/2023    Urinary retention 04/06/2023     Past Surgical History:   Procedure Laterality Date    CATARACT EXTRACTION Bilateral 03/26/2016    Cataract Surgery    CORONARY ARTERY BYPASS GRAFT   2018    CABG     Family History   Problem Relation Name Age of Onset    Coronary artery disease Mother      Hypertension Father       Social History     Tobacco Use    Smoking status: Former     Current packs/day: 0.00     Types: Cigarettes     Quit date:      Years since quittin.7     Passive exposure: Past    Smokeless tobacco: Never   Substance Use Topics    Alcohol use: Not Currently    Drug use: Not Currently       Physical Exam   ED Triage Vitals [24 1411]   Temperature Heart Rate Respirations BP   36.3 °C (97.3 °F) (!) 40 12 138/80      Pulse Ox Temp Source Heart Rate Source Patient Position   100 % Temporal -- --      BP Location FiO2 (%)     -- --       Physical Exam  Vitals and nursing note reviewed.   Constitutional:       General: He is not in acute distress.     Appearance: Normal appearance. He is not toxic-appearing.   HENT:      Head: Normocephalic.      Mouth/Throat:      Mouth: Mucous membranes are moist.   Eyes:      Conjunctiva/sclera: Conjunctivae normal.      Pupils: Pupils are equal, round, and reactive to light.   Cardiovascular:      Rate and Rhythm: Regular rhythm. Bradycardia present.      Pulses:           Radial pulses are 2+ on the right side and 2+ on the left side.      Comments: HARIS radiates to carotids  Pulmonary:      Effort: Pulmonary effort is normal. No respiratory distress.      Breath sounds: Normal breath sounds.   Abdominal:      General: Abdomen is flat.      Palpations: Abdomen is soft.      Tenderness: There is no abdominal tenderness. There is no guarding or rebound.   Musculoskeletal:      Cervical back: Normal range of motion and neck supple.      Right lower leg: No edema.      Left lower leg: No edema.   Skin:     General: Skin is warm.   Neurological:      Mental Status: He is alert and oriented to person, place, and time.   Psychiatric:         Mood and Affect: Mood normal.           ED Course & MDM   ED Course as of 24 1534   Sun Sep 29,  2024 1533 Electrolytes are within normal limits.  Patient's creatinine is better than his most recent.  Patient just has mild anemia with a hemoglobin of 11.0.  Patient's heart rate has slowly increased into the mid 40s.  His blood pressure has remained normal at 136/65.  I will hospitalize patient to the stepdown unit under Dr. Núñez's service. [JG]      ED Course User Index  [JG] Grazyna Mascorro MD         Diagnoses as of 09/29/24 1534   Sinus bradycardia                 No data recorded                                 Medical Decision Making  Amount and/or Complexity of Data Reviewed  ECG/medicine tests: independent interpretation performed.     Details: Sinus bradycardia, heart rate 39, normal axis, no ST elevations or depressions.  Nonspecific T wave inversions inferiorly.    Discussion of management or test interpretation with external provider(s): Prior heart rates are in the 60s so this heart rate in the 30s is highly abnormal for him.  Will check electrolytes to see if there is any reversible etiology.  Will plan to observe him on telemetry to see if there is a period of washout if he accidentally took too many pills.  His blood pressure is normal so there is no urgent need to give him atropine or pacing.        Procedure  Procedures     Grazyna Mascorro MD  09/29/24 1456       Grazyna Mascorro MD  09/29/24 1534

## 2024-09-29 NOTE — PROGRESS NOTES
Pharmacy Medication History Review    Wojciech Kern is a 74 y.o. male admitted for Sinus bradycardia. Pharmacy reviewed the patient's [id number] medications and allergies for accuracy.    The list below reflectives the updated PTA list. Please review each medication in order reconciliation for additional clarification and justification.       The list below reflectives the updated allergy list. Please review each documented allergy for additional clarification and justification.  Allergies  Reviewed by Grazyna Mascorro MD on 9/29/2024        Severity Reactions Comments    Penicillins High Anaphylaxis, Hives     Sulfa (sulfonamide Antibiotics) Not Specified Hives             Below are additional concerns with the patient's PTA list.  Prior to Admission Medications   Prescriptions Last Dose Informant   aspirin 81 mg EC tablet 9/29/2024    Sig: Take 1 tablet (81 mg) by mouth once daily.   carvedilol (Coreg) 3.125 mg tablet 9/29/2024    Sig: Take 1 tablet (3.125 mg) by mouth 2 times daily (morning and late afternoon).   clopidogrel (Plavix) 75 mg tablet 9/29/2024    Sig: Take 1 tablet (75 mg) by mouth once daily.           lisinopril 10 mg tablet 9/29/2024    Sig: Take 1 tablet (10 mg) by mouth once daily. Take first dose 36 hours after your last dose of entresto   multivitamin (MULTIPLE VITAMINS ORAL) 9/29/2024    Sig: Take 1 tablet by mouth once daily.   omeprazole OTC (PriLOSEC OTC) 20 mg EC tablet     Sig: Take 1 tablet (20 mg) by mouth once daily. Do not crush, chew, or split.   rosuvastatin (Crestor) 20 mg tablet 9/29/2024    Sig: Take 1 tablet (20 mg) by mouth once daily.   spironolactone (Aldactone) 25 mg tablet 9/29/2024    Sig: Take 0.5 tablets (12.5 mg) by mouth once daily.   tiotropium (Spiriva Respimat) 2.5 mcg/actuation inhaler 9/29/2024    Sig: Inhale 2 puffs once daily.   torsemide (Demadex) 100 mg tablet 9/29/2024    Sig: Take 0.5 tablets (50 mg) by mouth once daily.       Facility-Administered Medications: None      The following updates were made to the Prior to Admission medication list:     Source of Information:     Medications ADDED:   N/a  Medications CHANGED:  Spironolactone is only 1/2 daily   Medications REMOVED:   farxiga  Medications NOT TAKING:   Gabapentin ( took it and knocked him on his butt so stopped)  Prilosec ( doesn't know anything about being on that )     Allergy reviewed : Yes    Comments: per patient.     Francine Gonzalez

## 2024-09-29 NOTE — H&P
History Of Present Illness  Wojciech Kern is a 74 y.o. male with PMH of hypertension, hyperlipidemia, COPD, CKD, CAD/inferior MI s/p 3 HELEN to RCA, VSD s/p CABG x 1 (SVG-LAD) and VSD repair in 2015 and cardiomyopathy s/p SQ ICD placement in 2018.     Of note patient was seen 8/6 with cardiology for routine follow up. At that time he had not been taking some of his medications due to the cost, and did not qualify for any assistance based on his income. Therefore his jardiance and entresto were discontinued and he was started on carvedilol 3.125 mg twice daily and Lisinopril 10 mg daily. During his next cardiology visit on 9/3 HR was 53 and BP was 112/53.  Patient was deemped stable from a cardiac perspective at that time. Patient was then seen on 9/6 by PCP with a HR of 46. Per note patient was instructed if HR < 50 at home he should call his cardiologist.   Of note patient presented to urgent care on 9/13 for dizziness/nasal congestion. Per chart review patient refused EKG and left AMA stating he would follow up with cardiology.     Upon interview today patient states that the pills are so small he may have taken 2 pills however states that he uses a pill box and is usually pretty good at taking his medications. He states over the past month his HR have ranged from mid 40s to mid 50s. However yesterday was the first time he has dizziness. He states that yesterday he drove home from the grocery store and once he arrived home he began to feel dizzy with walking. This improved overtime however today it returned an worsened. He states his morning he was able to get up and walk the dog however throughout the day the dizziness returned and he became lightheaded with blurry vision. At that point he decided to present to the ED. He states she currently doesn't feel dizziness however doesn't know if that because he hasn't moved much.   He otherwise denies HA, current blurry vision, palpitation, syncope, chest pain, shortness  of breath, abdominal pain. He denies changes in diet or bowel habits.        ED course:  VS: T 36.3  /65 HR 45 RR 20 O2 99 on RA    Labs:  CBC WBC 7.4 Hb 11 Plt 225  RFP Na 131 K 5.1 Cl 101 HCO3 24 BUN 42 SCr 1.77 Glc 90 Ca 8.8   LFTs AST/ALT 17/14 Alk Phos 54 T Bili 0.6 Alb 4.3 Tprot 7.8    EKG: sinus bradycardia     Imaging/Procedures:  Echocardiogram 11/2023 showed an EF of 25-30% with mildly reduced RV systolic function and moderate functional MR. Heart catheterization 9/2022 showed moderate LM disease similar to prior angiography, patent RCA stents to level of RPDA, subintimal stent in RPL occluded, and patent SVG-LAD.     ED Interventions   None   Past Medical History  He has a past medical history of AICD (automatic cardioverter/defibrillator) present (04/06/2023), Atherosclerotic heart disease of native coronary artery without angina pectoris (10/28/2015), CHF (congestive heart failure) (Multi), Chronic kidney disease, stage 3 unspecified (Multi) (12/19/2019), Chronic renal failure (CRF), stage 4 (severe) (Multi) (03/22/2024), Community acquired pneumonia (04/06/2023), Cortical age-related cataract, left eye (03/31/2016), Dysphagia, unspecified (08/20/2015), Dysuria (04/06/2023), Emphysema/COPD (Multi) (04/06/2023), Essential (primary) hypertension (02/26/2018), Gastric ulcer (04/06/2023), Gastritis (04/06/2023), Hemoptysis (05/17/2016), HFrEF (heart failure with reduced ejection fraction) (Multi) (04/06/2023), Kidney stone on left side (04/06/2023), Melena (04/06/2023), NUD (nonulcer dyspepsia) (04/06/2023), Personal history of peptic ulcer disease (05/23/2019), Personal history of pneumonia (recurrent) (03/07/2016), Prurigo nodularis (04/06/2023), Stage 3a chronic kidney disease (Multi) (04/06/2023), and Urinary retention (04/06/2023).    Surgical History  He has a past surgical history that includes Coronary artery bypass graft (02/26/2018) and Cataract extraction (Bilateral, 03/26/2016).     Social  History  He reports that he quit smoking about 26 years ago. His smoking use included cigarettes. He has been exposed to tobacco smoke. He has never used smokeless tobacco. He reports that he does not currently use alcohol. He reports that he does not currently use drugs.    Family History  Family History   Problem Relation Name Age of Onset    Coronary artery disease Mother      Hypertension Father          Allergies  Penicillins and Sulfa (sulfonamide antibiotics)    Review of Systems   Negative except for what is listed above   Physical Exam     Last Recorded Vitals  /65   Pulse (!) 45   Temp 36.3 °C (97.3 °F) (Temporal)   Resp (!) 21   Wt 68 kg (150 lb)   SpO2 99%     Relevant Results         Assessment/Plan   Assessment & Plan  Sinus bradycardia      Patient presenting with dizziness most likely in the setting of bradycardia due to new carvedilol medication. Orthostats and TSH pending.      #CAD  #HTN   #HLD  -monitor tele   -EKG sinus shireen   -tsh pending   -c/w ASA/plavix  -c/w rosuvastatin    -c/w lisinopril and spirolactone   -hold carvedilol   - consider echo in AM if warranted   - consider cards consult in AM if no improvement   -NPO at MN     CKD  -Cr at baseline  C/t monitor CMP     F: bolus prn  E: replete prn  N: regular  GI: pantoprazole for GERD   DVT: heparin subcutaneous   A: PIVs    Full Code (confirmed on admission)          Mariah Núñez MD

## 2024-09-30 ENCOUNTER — APPOINTMENT (OUTPATIENT)
Dept: OTOLARYNGOLOGY | Facility: CLINIC | Age: 74
End: 2024-09-30
Payer: MEDICARE

## 2024-09-30 VITALS
BODY MASS INDEX: 21.73 KG/M2 | DIASTOLIC BLOOD PRESSURE: 70 MMHG | WEIGHT: 146.7 LBS | HEART RATE: 67 BPM | TEMPERATURE: 98.4 F | OXYGEN SATURATION: 97 % | RESPIRATION RATE: 20 BRPM | SYSTOLIC BLOOD PRESSURE: 124 MMHG | HEIGHT: 69 IN

## 2024-09-30 LAB
ANION GAP SERPL CALC-SCNC: 13 MMOL/L (ref 10–20)
ATRIAL RATE: 39 BPM
BUN SERPL-MCNC: 37 MG/DL (ref 6–23)
CALCIUM SERPL-MCNC: 8.8 MG/DL (ref 8.6–10.3)
CHLORIDE SERPL-SCNC: 102 MMOL/L (ref 98–107)
CO2 SERPL-SCNC: 24 MMOL/L (ref 21–32)
CREAT SERPL-MCNC: 1.66 MG/DL (ref 0.5–1.3)
EGFRCR SERPLBLD CKD-EPI 2021: 42 ML/MIN/1.73M*2
GLUCOSE SERPL-MCNC: 83 MG/DL (ref 74–99)
P AXIS: 43 DEGREES
P OFFSET: 189 MS
P ONSET: 126 MS
POTASSIUM SERPL-SCNC: 4.6 MMOL/L (ref 3.5–5.3)
PR INTERVAL: 174 MS
Q ONSET: 213 MS
QRS COUNT: 7 BEATS
QRS DURATION: 106 MS
QT INTERVAL: 524 MS
QTC CALCULATION(BAZETT): 421 MS
QTC FREDERICIA: 454 MS
R AXIS: -2 DEGREES
SODIUM SERPL-SCNC: 134 MMOL/L (ref 136–145)
T AXIS: 58 DEGREES
T OFFSET: 475 MS
VENTRICULAR RATE: 39 BPM

## 2024-09-30 PROCEDURE — 36415 COLL VENOUS BLD VENIPUNCTURE: CPT | Performed by: INTERNAL MEDICINE

## 2024-09-30 PROCEDURE — 2500000001 HC RX 250 WO HCPCS SELF ADMINISTERED DRUGS (ALT 637 FOR MEDICARE OP): Performed by: STUDENT IN AN ORGANIZED HEALTH CARE EDUCATION/TRAINING PROGRAM

## 2024-09-30 PROCEDURE — G0378 HOSPITAL OBSERVATION PER HR: HCPCS

## 2024-09-30 PROCEDURE — 99239 HOSP IP/OBS DSCHRG MGMT >30: CPT | Performed by: INTERNAL MEDICINE

## 2024-09-30 PROCEDURE — 99222 1ST HOSP IP/OBS MODERATE 55: CPT | Performed by: INTERNAL MEDICINE

## 2024-09-30 PROCEDURE — 2500000002 HC RX 250 W HCPCS SELF ADMINISTERED DRUGS (ALT 637 FOR MEDICARE OP, ALT 636 FOR OP/ED): Performed by: STUDENT IN AN ORGANIZED HEALTH CARE EDUCATION/TRAINING PROGRAM

## 2024-09-30 PROCEDURE — 80048 BASIC METABOLIC PNL TOTAL CA: CPT | Performed by: INTERNAL MEDICINE

## 2024-09-30 PROCEDURE — 2500000004 HC RX 250 GENERAL PHARMACY W/ HCPCS (ALT 636 FOR OP/ED): Performed by: STUDENT IN AN ORGANIZED HEALTH CARE EDUCATION/TRAINING PROGRAM

## 2024-09-30 PROCEDURE — 96372 THER/PROPH/DIAG INJ SC/IM: CPT | Performed by: STUDENT IN AN ORGANIZED HEALTH CARE EDUCATION/TRAINING PROGRAM

## 2024-09-30 PROCEDURE — 94640 AIRWAY INHALATION TREATMENT: CPT

## 2024-09-30 RX ADMIN — LISINOPRIL 10 MG: 10 TABLET ORAL at 09:35

## 2024-09-30 RX ADMIN — SPIRONOLACTONE 12.5 MG: 25 TABLET ORAL at 09:35

## 2024-09-30 RX ADMIN — CLOPIDOGREL 75 MG: 75 TABLET ORAL at 09:35

## 2024-09-30 RX ADMIN — ASPIRIN 81 MG: 81 TABLET, COATED ORAL at 09:35

## 2024-09-30 RX ADMIN — HEPARIN SODIUM 5000 UNITS: 5000 INJECTION, SOLUTION INTRAVENOUS; SUBCUTANEOUS at 09:36

## 2024-09-30 RX ADMIN — TIOTROPIUM BROMIDE INHALATION SPRAY 2 PUFF: 3.12 SPRAY, METERED RESPIRATORY (INHALATION) at 08:33

## 2024-09-30 RX ADMIN — ROSUVASTATIN 20 MG: 20 TABLET, FILM COATED ORAL at 09:35

## 2024-09-30 SDOH — HEALTH STABILITY: MENTAL HEALTH: HOW MANY STANDARD DRINKS CONTAINING ALCOHOL DO YOU HAVE ON A TYPICAL DAY?: 1 OR 2

## 2024-09-30 SDOH — HEALTH STABILITY: MENTAL HEALTH: HOW OFTEN DO YOU HAVE 6 OR MORE DRINKS ON ONE OCCASION?: NEVER

## 2024-09-30 SDOH — HEALTH STABILITY: MENTAL HEALTH: HOW OFTEN DO YOU HAVE A DRINK CONTAINING ALCOHOL?: MONTHLY OR LESS

## 2024-09-30 ASSESSMENT — COGNITIVE AND FUNCTIONAL STATUS - GENERAL
MOBILITY SCORE: 20
WALKING IN HOSPITAL ROOM: A LITTLE
MOVING TO AND FROM BED TO CHAIR: A LITTLE
STANDING UP FROM CHAIR USING ARMS: A LITTLE
STANDING UP FROM CHAIR USING ARMS: A LITTLE
CLIMB 3 TO 5 STEPS WITH RAILING: A LITTLE
DAILY ACTIVITIY SCORE: 24
MOVING TO AND FROM BED TO CHAIR: A LITTLE
STANDING UP FROM CHAIR USING ARMS: A LITTLE
CLIMB 3 TO 5 STEPS WITH RAILING: A LITTLE
WALKING IN HOSPITAL ROOM: A LITTLE
MOVING TO AND FROM BED TO CHAIR: A LITTLE
WALKING IN HOSPITAL ROOM: A LITTLE
MOVING TO AND FROM BED TO CHAIR: A LITTLE
WALKING IN HOSPITAL ROOM: A LITTLE
MOBILITY SCORE: 20
CLIMB 3 TO 5 STEPS WITH RAILING: A LITTLE
DAILY ACTIVITIY SCORE: 24
CLIMB 3 TO 5 STEPS WITH RAILING: A LITTLE
STANDING UP FROM CHAIR USING ARMS: A LITTLE

## 2024-09-30 ASSESSMENT — LIFESTYLE VARIABLES
SKIP TO QUESTIONS 9-10: 1
AUDIT-C TOTAL SCORE: 1

## 2024-09-30 ASSESSMENT — PAIN SCALES - GENERAL
PAINLEVEL_OUTOF10: 0 - NO PAIN
PAINLEVEL_OUTOF10: 0 - NO PAIN

## 2024-09-30 ASSESSMENT — ACTIVITIES OF DAILY LIVING (ADL): LACK_OF_TRANSPORTATION: NO

## 2024-09-30 ASSESSMENT — PAIN - FUNCTIONAL ASSESSMENT: PAIN_FUNCTIONAL_ASSESSMENT: 0-10

## 2024-09-30 NOTE — CARE PLAN
The patient's goals for the shift include      The clinical goals for the shift include pt will remain safe, free from pain and injury throughout shift    Over the shift, the patient did not make progress toward the following goals. Barriers to progression include . Recommendations to address these barriers include   Problem: Pain - Adult  Goal: Verbalizes/displays adequate comfort level or baseline comfort level  Outcome: Progressing     Problem: Safety - Adult  Goal: Free from fall injury  Outcome: Progressing     Problem: Discharge Planning  Goal: Discharge to home or other facility with appropriate resources  Outcome: Progressing     Problem: Chronic Conditions and Co-morbidities  Goal: Patient's chronic conditions and co-morbidity symptoms are monitored and maintained or improved  Outcome: Progressing   .     normal balance

## 2024-09-30 NOTE — CONSULTS
Inpatient consult to Cardiology  Consult performed by: Zehra Wilkins, SHANNON-CNP  Consult ordered by: Georges Gee MD        History Of Present Illness:    Wojciech Kern is a 74 y.o. male with past medical history of hypertension, hyperlipidemia, COPD, CKD, CAD/inferior MI s/p 3 HELEN to RCA that was c/b guidewire dissection requiring 4th HELEN, developing a VSD s/p CABG x 1 (SVG-LAD) and VSD repair in 2015 and cardiomyopathy s/p SQ ICD placement in 2018. Echocardiogram 11/2023 showed an EF of 25-30% with mildly reduced RV systolic function and moderate functional MR. Heart catheterization 9/2022 showed moderate LM disease similar to prior angiography, patent RCA stents to level of RPDA, subintimal stent in RPL occluded, and patent SVG-LAD who presented to [facility] ED with dizziness and leg weakness.  Cardiology consulted for bradycardia.      He has been on carvedilol in the past, but was off some medications due to cost.  Restarted carvedilol 3.125mg BID on 8/6/2024 where HR was noted to be 81 at that visit.  HR at subsequent cardiology follow up on 9/3/2024 showing HR 53 with BP 1125/53.  Seen by PCP on 9/9/2024 with HR 46.  He went to urgent care with dizziness, but left AMA and refused EKG.     States he has been having on and off leg weakness and some dizziness for the past few days, maybe the past few weeks.  He has been dizzy at times.  He has been checking his HR and BP at home for the past 3 months with HR mostly in the 50's, but down to 40's at times.  SBP usually 120's.   No exacerbating or relieving factors.  Patient denies chest pain and angina.  He has some mild shortness of breath with ambulation, but this is at baseline.  Pt denies orthopnea, and paroxysmal nocturnal dyspnea.  Pt denies worsening lower extremity edema.  Pt denies palpitations or syncope.  No recent falls.  No fever or chills.  No cough.  No change in bowel or bladder habits.  Reports his pills are so small, and he is wondering if he  is doubling up on some medications.      Home CV medications:  ASA 81mg daily, rosuvastatin 20mg daily, Plavix 75mg daily, lisinopril 10mg daily, carvedilol 3.125mg BID, spironolactone 12.5mg daily, torsemide 50mg daily ( Entresto changed to lisinopril due to cost, Jardiance stopped due to cost).     Follows with Dr. Panchal / Tawny Carreno [facility] for cardiology, last visit 9/3/2024    Past Cardiology Tests (Last 3 Years):    EKG: Sinus bradycardia, HR 39      Echo:  TTE 10/25/2023  CONCLUSIONS:   1. Left ventricular systolic function is severely decreased with a 25-30% estimated ejection fraction.   2. There is global hypokinesis of the left ventricle with minor regional variations.   3. The inferior and infero lateral wall are the most hypokinetic. 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 There is a 1.4 x 1.4 cm echodensity in the submitral apparatus (clip 110). DDx includes calcified papillary muscle (favored) v less likely thrombus.   4. There is moderate, functional mitral valve regurgitation which is eccentrically directed. The degree of mitral regurgitation may be underestimated due to the eccentricity of the jet.   5. There is mildly reduced right ventricular systolic function.   6. The left atrium is severely dilated.   7. RVSP within normal limits.   8. Compared with study from 9/10/2022, there is now at least moderate functional mitral regurgitation, which may be underestimated due to the eccentricity of the jet. A bright echodensity in the submitral apparatus is seen in some views more easily on the current study, though it also appears in certain clips on the prior study (clip 39) and favored to be a calcified papillary muscle. Comparison of the size of the LV aneurysm is challenging due to technical limitations.    TTE 9/10/2022   1. Left ventricular systolic function is severely decreased with a 25-30% estimated ejection fraction.   2. Basal inferior segment is abnormal.   3. Spectral  Doppler shows a pseudonormal pattern of left ventricular diastolic filling.   4. There is eccentric left ventricular hypertrophy.   5. There is mildly reduced right ventricular systolic function.   6. The left atrium is severely dilated.   7. There is global hypokinesis of the left ventricle with minor regional variations.    Cath:    [facility] 9/12/2022  CONCLUSIONS:   1. Severe single vessel CAD in a right dominant system.   2. Moderate LM disease similar to prior angiography.   3. Patent RCA stents to level of RPDA. Subintimal stent in RPLB occluded.   4. Patent SVG-LAD.   5. Mildly elevated LVEDP.   6. No aortic stenosis.    [facility] 7/6/2017  CONCLUSIONS:   1. Distal single-vessel coronary artery disease in a right dominant system.   2. Patent SVG-LAD.   3. Severe left ventricular systolic dysfunction with large posterobasal aneurysm.   4. Severe mitral regurgitation.    Stress Test:    Cardiac Imaging:      Past Medical History:  He has a past medical history of AICD (automatic cardioverter/defibrillator) present (04/06/2023), Atherosclerotic heart disease of native coronary artery without angina pectoris (10/28/2015), CHF (congestive heart failure) (Multi), Chronic kidney disease, stage 3 unspecified (Multi) (12/19/2019), Chronic renal failure (CRF), stage 4 (severe) (Multi) (03/22/2024), Community acquired pneumonia (04/06/2023), Cortical age-related cataract, left eye (03/31/2016), Dysphagia, unspecified (08/20/2015), Dysuria (04/06/2023), Emphysema/COPD (Multi) (04/06/2023), Essential (primary) hypertension (02/26/2018), Gastric ulcer (04/06/2023), Gastritis (04/06/2023), Hemoptysis (05/17/2016), HFrEF (heart failure with reduced ejection fraction) (Multi) (04/06/2023), Kidney stone on left side (04/06/2023), Melena (04/06/2023), NUD (nonulcer dyspepsia) (04/06/2023), Personal history of peptic ulcer disease (05/23/2019), Personal history of pneumonia (recurrent) (03/07/2016), Prurigo nodularis (04/06/2023), Stage 3a  chronic kidney disease (Multi) (2023), and Urinary retention (2023).    Past Surgical History:  He has a past surgical history that includes Coronary artery bypass graft (2018) and Cataract extraction (Bilateral, 2016).      Social History:  He reports that he quit smoking about 26 years ago. His smoking use included cigarettes. He has been exposed to tobacco smoke. He has never used smokeless tobacco. He reports that he does not currently use alcohol. He reports that he does not currently use drugs.    Family History:  Family History   Problem Relation Name Age of Onset    Coronary artery disease Mother      Hypertension Father          Allergies:  Penicillins and Sulfa (sulfonamide antibiotics)    ROS:  10 point review of systems including (Constitutional, Eyes, ENMT, Respiratory, Cardiac, Gastrointestinal, Neurological, Psychiatric, and Hematologic) was performed and is otherwise negative.    Objective Data:  Last Recorded Vitals:  Vitals:    24 0806 24 0833 24 0835 24 0837   BP: 138/62 128/70 135/61 132/63   BP Location: Left arm Left arm Left arm Left arm   Patient Position:  Lying Sitting Standing   Pulse: (!) 42 55 (!) 46 (!) 43   Resp: 16 18     Temp: 36.7 °C (98.1 °F)      TempSrc:       SpO2: 98%      Weight:       Height:         Medical Gas Therapy: None (Room air)  Weight  Av.3 kg (148 lb 5.6 oz)  Min: 66.5 kg (146 lb 11.2 oz)  Max: 68 kg (150 lb)    LABS:  CMP:  Results from last 7 days   Lab Units 24  1453   SODIUM mmol/L 131*   POTASSIUM mmol/L 5.1   CHLORIDE mmol/L 101   CO2 mmol/L 24   ANION GAP mmol/L 11   BUN mg/dL 42*   CREATININE mg/dL 1.77*   EGFR mL/min/1.73m*2 39*   MAGNESIUM mg/dL 2.30   ALBUMIN g/dL 4.3   ALT U/L 14   AST U/L 17   BILIRUBIN TOTAL mg/dL 0.6     CBC:  Results from last 7 days   Lab Units 24  1453   WBC AUTO x10*3/uL 7.4   HEMOGLOBIN g/dL 11.0*   HEMATOCRIT % 32.2*   PLATELETS AUTO x10*3/uL 225   MCV fL 95  "    COAG:     ABO: No results found for: \"ABO\"  HEME/ENDO:  Results from last 7 days   Lab Units 09/29/24  1453   TSH mIU/L 0.81      CARDIAC:   Results from last 7 days   Lab Units 09/29/24  1453   TROPHS ng/L 14             Last I/O:    Intake/Output Summary (Last 24 hours) at 9/30/2024 0929  Last data filed at 9/30/2024 0506  Gross per 24 hour   Intake 240 ml   Output --   Net 240 ml     Net IO Since Admission: 240 mL [09/30/24 0929]      Imaging Results:      Inpatient Medications:  Scheduled medications   Medication Dose Route Frequency    aspirin  81 mg oral Daily    clopidogrel  75 mg oral Daily    heparin (porcine)  5,000 Units subcutaneous q8h CAMMIE    lisinopril  10 mg oral Daily    pantoprazole  20 mg oral Daily before breakfast    rosuvastatin  20 mg oral Daily    spironolactone  12.5 mg oral Daily    tiotropium  2 puff inhalation Daily     PRN medications   Medication     Continuous Medications   Medication Dose Last Rate       Outpatient Medications:  Current Outpatient Medications   Medication Instructions    aspirin 81 mg EC tablet 1 tablet, oral, Daily    carvedilol (COREG) 3.125 mg, oral, 2 times daily (morning and late afternoon)    clopidogrel (PLAVIX) 75 mg, oral, Daily    gabapentin (NEURONTIN) 100 mg, oral, Nightly, 1 tablet daily on Day 1, 1 tablet BID on Day 2, then 1 tablet TID PRN for pain    lisinopril 10 mg, oral, Daily, Take first dose 36 hours after your last dose of entresto    multivitamin (MULTIPLE VITAMINS ORAL) 1 tablet, oral, Daily    omeprazole OTC (PRILOSEC OTC) 20 mg, oral, Daily, Do not crush, chew, or split.    rosuvastatin (CRESTOR) 20 mg, oral, Daily    spironolactone (ALDACTONE) 12.5 mg, oral, Daily    tiotropium (Spiriva Respimat) 2.5 mcg/actuation inhaler 2 puffs, inhalation, Daily RT    torsemide (DEMADEX) 50 mg, oral, Daily       Physical Exam:    General:  Patient is awake, alert, and oriented.  Patient is in no acute distress.  HEENT:  Normocephalic.  Moist mucosa.  "   Neck:  Normal Jugular Venous Pressure.  Cardiovascular:  Regular rate and rhythm.  Normal S1 and S2, no murmurs, rubs or gallops  Pulmonary:  Clear to auscultation bilaterally.  Abdomen:  Soft. Non-tender.   Non-distended.  Positive bowel sounds.  Lower Extremities:  2+ pedal pulses. No LE edema.  Neurologic:  Alert and oriented x3. No focal deficit.   Skin: Skin warm and dry, normal skin turgor.   Psychiatric: Normal affect.      Assessment/Plan   Wojciech Kern is a 74 y.o. male with past medical history of hypertension, hyperlipidemia, COPD, CKD, CAD/inferior MI s/p 3 HELEN to RCA that was c/b guidewire dissection requiring 4th HELEN, developing a VSD s/p CABG x 1 (SVG-LAD) and VSD repair in 2015 and cardiomyopathy s/p SQ ICD placement in 2018. Echocardiogram 11/2023 showed an EF of 25-30% with mildly reduced RV systolic function and moderate functional MR. Heart catheterization 9/2022 showed moderate LM disease similar to prior angiography, patent RCA stents to level of RPDA, subintimal stent in RPL occluded, and patent SVG-LAD who presented to [facility] ED with dizziness and leg weakness.  Cardiology consulted for bradycardia.     Assessment:  #ADHF  #ICM/HFrEF (EF 25-30%), ACC/AHA class C  #CAD (inferior MI s/p 3 HELEN to RCA that was c/b guidewire dissection requiring 4th HELEN, developing a VSD s/p CABG x 1 (SVG-LAD) and VSD repair in 2015) and SQ ICD placement in 2018  # Sinus bradycardia  -  He has been on and off carvedilol with HR noted to be in 40's while on carvedilol 6.25mg . Recently restarted on carvedilol 3.125mg on 8/6/2024.    - HR does increment to 50's with ambulation.    - No dizziness since admit.   BP stable.     Plan:  -Current GDMT:  *BB: Coreg 3.125 mg BID   *ACE/ARB/ARNI: lisinopril 10mg daily ( Entresto stopped due to cost)  *MRA: START Spironolactone 12.5mg daily  *SGLT2-inhibitor: stopped Jardiance due to cost    - Plan to hold carvedilol at this time.  He may be able to restart as outpatient.    - Continue all other home cardiac medications.     OK to discharge home from a cardiology standpoint.   Follow up with Dr. Panchal / Tawny Carreno CNP within 2-3 weeks of hospital discharge.       Code Status:  Full Code      SHANNON Fontana-MARIANELA

## 2024-09-30 NOTE — DISCHARGE SUMMARY
"Admitting Provider: Mariah Núñez MD  Discharge Provider: Georges Gee MD  Primary Care Physician at Discharge: Helio Sparks [phone]   Admission Date: 9/29/2024     Discharge Date: 9/30/2024  Current Planned Discharge Disposition:      Discharge Diagnoses  Principal Problem:    Sinus bradycardia  Active Problems:    Benign essential hypertension    CAD (coronary artery disease)    BPH associated with nocturia    Chronic renal impairment, stage 3b (Multi)    Postural dizziness      Hospital Course  74 yoM with lightheadedness.    HR sinus bradycardia down to 40s. He is on coreg and took too much by mistake. Coreg was held here. He felt better the next day. HR now 40s-50s at rest and 60s-70s on getting up (chronotropic competence). Holding coreg on discharge; he was follow up with cardiology. Discharged home in stable condition.     Test Results Pending At Discharge  Pending Labs       No current pending labs.            Pertinent Physical Exam At Time of Discharge  /70 (BP Location: Left arm)   Pulse 67   Temp 36.9 °C (98.4 °F)   Resp 20   Ht 1.753 m (5' 9\")   Wt 66.5 kg (146 lb 11.2 oz)   SpO2 97%   BMI 21.66 kg/m²   Physical Exam  Cardiovascular:      Rate and Rhythm: Normal rate and regular rhythm.      Heart sounds: Normal heart sounds.   Pulmonary:      Breath sounds: Normal breath sounds.   Abdominal:      General: Bowel sounds are normal.      Palpations: Abdomen is soft.   Musculoskeletal:         General: Normal range of motion.   Neurological:      General: No focal deficit present.      Mental Status: He is alert and oriented to person, place, and time.   Psychiatric:         Mood and Affect: Mood normal.         Home Medications     Medication List      CONTINUE taking these medications     aspirin 81 mg EC tablet   clopidogrel 75 mg tablet; Commonly known as: Plavix; Take 1 tablet (75   mg) by mouth once daily.   gabapentin 100 mg capsule; Commonly known as: Neurontin; " Take 1 capsule   (100 mg) by mouth once daily at bedtime. 1 tablet daily on Day 1, 1 tablet   BID on Day 2, then 1 tablet TID PRN for pain   lisinopril 10 mg tablet; Take 1 tablet (10 mg) by mouth once daily. Take   first dose 36 hours after your last dose of entresto   MULTIPLE VITAMINS ORAL   rosuvastatin 20 mg tablet; Commonly known as: Crestor; Take 1 tablet (20   mg) by mouth once daily.   spironolactone 25 mg tablet; Commonly known as: Aldactone; Take 0.5   tablets (12.5 mg) by mouth once daily.   tiotropium 2.5 mcg/actuation inhaler; Commonly known as: Spiriva   Respimat; Inhale 2 puffs once daily.   torsemide 100 mg tablet; Commonly known as: Demadex; Take 0.5 tablets   (50 mg) by mouth once daily.     STOP taking these medications     carvedilol 3.125 mg tablet; Commonly known as: Coreg   omeprazole OTC 20 mg EC tablet; Commonly known as: PriLOSEC OTC       Outpatient Follow-Up  Future Appointments   Date Time Provider Department Center   10/14/2024  4:00 PM Helio Sparks MD [id number] [facility]   11/6/2024 11:30 AM Viviana Mccray MD [id number] [facility]   12/13/2024  1:00 PM EWELINA [facility] [id number]   1/10/2025 11:00 AM SHANNON Bray-CNP [id number] [facility]   3/11/2025 11:30 AM Helio Sparks MD [id number] [facility]       MD IVAN Alvarenga spent more than 30 min coordinating this patient's discharge.

## 2024-09-30 NOTE — PROGRESS NOTES
09/30/24 0955   Discharge Planning   Living Arrangements Spouse/significant other   Support Systems Spouse/significant other   Assistance Needed none   Type of Residence Private residence   Number of Stairs to Enter Residence 2   Number of Stairs Within Residence 11   Type of Animals or Pets 2 dogs   Who is requesting discharge planning? Provider   Home or Post Acute Services None   Expected Discharge Disposition Home   Does the patient need discharge transport arranged? No   Financial Resource Strain   How hard is it for you to pay for the very basics like food, housing, medical care, and heating? Not hard   Housing Stability   In the last 12 months, was there a time when you were not able to pay the mortgage or rent on time? N   In the past 12 months, how many times have you moved where you were living? 0   At any time in the past 12 months, were you homeless or living in a shelter (including now)? N   Transportation Needs   In the past 12 months, has lack of transportation kept you from medical appointments or from getting medications? no   In the past 12 months, has lack of transportation kept you from meetings, work, or from getting things needed for daily living? No     I met with patient at bedside and explained tcc role. He lives in a house with his wife, has a cane/walker he uses to ambulate, drives. Admitted with bradycardia, cardiology is consulted. Denies any hhc needs at this time.

## 2024-09-30 NOTE — CARE PLAN
Problem: Pain - Adult  Goal: Verbalizes/displays adequate comfort level or baseline comfort level  Outcome: Progressing     Problem: Safety - Adult  Goal: Free from fall injury  Outcome: Progressing       The clinical goals for the shift include Maintain falls/safety precautions throughout shift. Bed Alarm Activated.

## 2024-10-01 ENCOUNTER — DOCUMENTATION (OUTPATIENT)
Dept: PRIMARY CARE | Facility: CLINIC | Age: 74
End: 2024-10-01
Payer: MEDICARE

## 2024-10-02 ENCOUNTER — PATIENT OUTREACH (OUTPATIENT)
Dept: PRIMARY CARE | Facility: CLINIC | Age: 74
End: 2024-10-02
Payer: MEDICARE

## 2024-10-02 NOTE — PROGRESS NOTES
TCM Outreach completed 10/1/24 and 10/2/24. No answer by pt or wife on either attempt. Pt is scheduled on 10/14/24 for hospital follow up, however was unable to reach pt during 2 business days after discharge despite at least 2 attempts made.

## 2024-10-03 ENCOUNTER — PATIENT OUTREACH (OUTPATIENT)
Dept: HOME HEALTH SERVICES | Age: 74
End: 2024-10-03
Payer: MEDICARE

## 2024-10-08 ENCOUNTER — ANCILLARY PROCEDURE (OUTPATIENT)
Dept: URGENT CARE | Age: 74
End: 2024-10-08
Payer: MEDICARE

## 2024-10-08 ENCOUNTER — APPOINTMENT (OUTPATIENT)
Dept: RADIOLOGY | Facility: HOSPITAL | Age: 74
End: 2024-10-08
Payer: MEDICARE

## 2024-10-08 ENCOUNTER — HOSPITAL ENCOUNTER (EMERGENCY)
Facility: HOSPITAL | Age: 74
Discharge: HOME | End: 2024-10-08
Attending: EMERGENCY MEDICINE
Payer: MEDICARE

## 2024-10-08 ENCOUNTER — APPOINTMENT (OUTPATIENT)
Dept: CARDIOLOGY | Facility: HOSPITAL | Age: 74
End: 2024-10-08
Payer: MEDICARE

## 2024-10-08 ENCOUNTER — OFFICE VISIT (OUTPATIENT)
Dept: URGENT CARE | Age: 74
End: 2024-10-08
Payer: MEDICARE

## 2024-10-08 VITALS
SYSTOLIC BLOOD PRESSURE: 133 MMHG | RESPIRATION RATE: 16 BRPM | HEART RATE: 67 BPM | OXYGEN SATURATION: 97 % | BODY MASS INDEX: 22.51 KG/M2 | HEIGHT: 69 IN | DIASTOLIC BLOOD PRESSURE: 76 MMHG | WEIGHT: 152 LBS | TEMPERATURE: 98.3 F

## 2024-10-08 VITALS
HEART RATE: 118 BPM | TEMPERATURE: 98.2 F | BODY MASS INDEX: 22.45 KG/M2 | DIASTOLIC BLOOD PRESSURE: 87 MMHG | SYSTOLIC BLOOD PRESSURE: 156 MMHG | RESPIRATION RATE: 16 BRPM | WEIGHT: 152 LBS | OXYGEN SATURATION: 96 %

## 2024-10-08 DIAGNOSIS — J18.9 HCAP (HEALTHCARE-ASSOCIATED PNEUMONIA): Primary | ICD-10-CM

## 2024-10-08 DIAGNOSIS — R04.2 HEMOPTYSIS: Primary | ICD-10-CM

## 2024-10-08 DIAGNOSIS — Z20.822 EXPOSURE TO CONFIRMED CASE OF COVID-19: ICD-10-CM

## 2024-10-08 DIAGNOSIS — R05.1 ACUTE COUGH: ICD-10-CM

## 2024-10-08 DIAGNOSIS — R04.2 HEMOPTYSIS: ICD-10-CM

## 2024-10-08 LAB
ALBUMIN SERPL BCP-MCNC: 3.8 G/DL (ref 3.4–5)
ALP SERPL-CCNC: 46 U/L (ref 33–136)
ALT SERPL W P-5'-P-CCNC: 14 U/L (ref 10–52)
ANION GAP BLDV CALCULATED.4IONS-SCNC: 12 MMOL/L (ref 10–25)
ANION GAP SERPL CALC-SCNC: 13 MMOL/L (ref 10–20)
AST SERPL W P-5'-P-CCNC: 14 U/L (ref 9–39)
BASE EXCESS BLDV CALC-SCNC: -1.4 MMOL/L (ref -2–3)
BASOPHILS # BLD AUTO: 0.02 X10*3/UL (ref 0–0.1)
BASOPHILS NFR BLD AUTO: 0.3 %
BILIRUB SERPL-MCNC: 0.6 MG/DL (ref 0–1.2)
BNP SERPL-MCNC: 758 PG/ML (ref 0–99)
BODY TEMPERATURE: 37 DEGREES CELSIUS
BUN SERPL-MCNC: 41 MG/DL (ref 6–23)
CA-I BLDV-SCNC: 1.24 MMOL/L (ref 1.1–1.33)
CALCIUM SERPL-MCNC: 8.7 MG/DL (ref 8.6–10.3)
CARDIAC TROPONIN I PNL SERPL HS: 11 NG/L (ref 0–20)
CARDIAC TROPONIN I PNL SERPL HS: 11 NG/L (ref 0–20)
CHLORIDE BLDV-SCNC: 102 MMOL/L (ref 98–107)
CHLORIDE SERPL-SCNC: 102 MMOL/L (ref 98–107)
CO2 SERPL-SCNC: 23 MMOL/L (ref 21–32)
CREAT SERPL-MCNC: 1.68 MG/DL (ref 0.5–1.3)
EGFRCR SERPLBLD CKD-EPI 2021: 42 ML/MIN/1.73M*2
EOSINOPHIL # BLD AUTO: 0.09 X10*3/UL (ref 0–0.4)
EOSINOPHIL NFR BLD AUTO: 1.4 %
ERYTHROCYTE [DISTWIDTH] IN BLOOD BY AUTOMATED COUNT: 13.4 % (ref 11.5–14.5)
GLUCOSE BLDV-MCNC: 99 MG/DL (ref 74–99)
GLUCOSE SERPL-MCNC: 96 MG/DL (ref 74–99)
HCO3 BLDV-SCNC: 24.3 MMOL/L (ref 22–26)
HCT VFR BLD AUTO: 27.2 % (ref 41–52)
HCT VFR BLD EST: 28 % (ref 41–52)
HGB BLD-MCNC: 9.1 G/DL (ref 13.5–17.5)
HGB BLDV-MCNC: 9.4 G/DL (ref 13.5–17.5)
IMM GRANULOCYTES # BLD AUTO: 0.04 X10*3/UL (ref 0–0.5)
IMM GRANULOCYTES NFR BLD AUTO: 0.6 % (ref 0–0.9)
INHALED O2 CONCENTRATION: 21 %
LACTATE BLDV-SCNC: 0.6 MMOL/L (ref 0.4–2)
LACTATE SERPL-SCNC: 0.5 MMOL/L (ref 0.4–2)
LYMPHOCYTES # BLD AUTO: 1.11 X10*3/UL (ref 0.8–3)
LYMPHOCYTES NFR BLD AUTO: 17.3 %
MCH RBC QN AUTO: 32.2 PG (ref 26–34)
MCHC RBC AUTO-ENTMCNC: 33.5 G/DL (ref 32–36)
MCV RBC AUTO: 96 FL (ref 80–100)
MONOCYTES # BLD AUTO: 0.52 X10*3/UL (ref 0.05–0.8)
MONOCYTES NFR BLD AUTO: 8.1 %
NEUTROPHILS # BLD AUTO: 4.62 X10*3/UL (ref 1.6–5.5)
NEUTROPHILS NFR BLD AUTO: 72.3 %
NRBC BLD-RTO: 0 /100 WBCS (ref 0–0)
OXYHGB MFR BLDV: 35 % (ref 45–75)
PCO2 BLDV: 44 MM HG (ref 41–51)
PH BLDV: 7.35 PH (ref 7.33–7.43)
PLATELET # BLD AUTO: 197 X10*3/UL (ref 150–450)
PO2 BLDV: 20 MM HG (ref 35–45)
POC RAPID INFLUENZA A: NEGATIVE
POC RAPID INFLUENZA B: NEGATIVE
POC SARS-COV-2 AG BINAX: NORMAL
POTASSIUM BLDV-SCNC: 4.7 MMOL/L (ref 3.5–5.3)
POTASSIUM SERPL-SCNC: 4.8 MMOL/L (ref 3.5–5.3)
PROT SERPL-MCNC: 6.7 G/DL (ref 6.4–8.2)
RBC # BLD AUTO: 2.83 X10*6/UL (ref 4.5–5.9)
SAO2 % BLDV: 35 % (ref 45–75)
SODIUM BLDV-SCNC: 134 MMOL/L (ref 136–145)
SODIUM SERPL-SCNC: 133 MMOL/L (ref 136–145)
WBC # BLD AUTO: 6.4 X10*3/UL (ref 4.4–11.3)

## 2024-10-08 PROCEDURE — 87075 CULTR BACTERIA EXCEPT BLOOD: CPT | Mod: 59,AHULAB | Performed by: PHYSICIAN ASSISTANT

## 2024-10-08 PROCEDURE — 99285 EMERGENCY DEPT VISIT HI MDM: CPT | Mod: 25

## 2024-10-08 PROCEDURE — 71275 CT ANGIOGRAPHY CHEST: CPT

## 2024-10-08 PROCEDURE — 36415 COLL VENOUS BLD VENIPUNCTURE: CPT | Performed by: PHYSICIAN ASSISTANT

## 2024-10-08 PROCEDURE — 2500000004 HC RX 250 GENERAL PHARMACY W/ HCPCS (ALT 636 FOR OP/ED): Performed by: EMERGENCY MEDICINE

## 2024-10-08 PROCEDURE — 83880 ASSAY OF NATRIURETIC PEPTIDE: CPT | Performed by: PHYSICIAN ASSISTANT

## 2024-10-08 PROCEDURE — 85025 COMPLETE CBC W/AUTO DIFF WBC: CPT | Performed by: PHYSICIAN ASSISTANT

## 2024-10-08 PROCEDURE — 83605 ASSAY OF LACTIC ACID: CPT | Performed by: PHYSICIAN ASSISTANT

## 2024-10-08 PROCEDURE — 71046 X-RAY EXAM CHEST 2 VIEWS: CPT

## 2024-10-08 PROCEDURE — 84295 ASSAY OF SERUM SODIUM: CPT | Performed by: PHYSICIAN ASSISTANT

## 2024-10-08 PROCEDURE — 96365 THER/PROPH/DIAG IV INF INIT: CPT

## 2024-10-08 PROCEDURE — 80053 COMPREHEN METABOLIC PANEL: CPT | Performed by: PHYSICIAN ASSISTANT

## 2024-10-08 PROCEDURE — 96361 HYDRATE IV INFUSION ADD-ON: CPT

## 2024-10-08 PROCEDURE — 96366 THER/PROPH/DIAG IV INF ADDON: CPT

## 2024-10-08 PROCEDURE — 93005 ELECTROCARDIOGRAM TRACING: CPT

## 2024-10-08 PROCEDURE — 84484 ASSAY OF TROPONIN QUANT: CPT | Mod: 91 | Performed by: PHYSICIAN ASSISTANT

## 2024-10-08 PROCEDURE — 82435 ASSAY OF BLOOD CHLORIDE: CPT | Mod: 59 | Performed by: PHYSICIAN ASSISTANT

## 2024-10-08 PROCEDURE — 2550000001 HC RX 255 CONTRASTS: Performed by: PHYSICIAN ASSISTANT

## 2024-10-08 PROCEDURE — 84484 ASSAY OF TROPONIN QUANT: CPT | Performed by: PHYSICIAN ASSISTANT

## 2024-10-08 PROCEDURE — 87040 BLOOD CULTURE FOR BACTERIA: CPT | Mod: 59,AHULAB | Performed by: PHYSICIAN ASSISTANT

## 2024-10-08 PROCEDURE — 71275 CT ANGIOGRAPHY CHEST: CPT | Performed by: STUDENT IN AN ORGANIZED HEALTH CARE EDUCATION/TRAINING PROGRAM

## 2024-10-08 RX ORDER — LEVOFLOXACIN 500 MG/1
750 TABLET, FILM COATED ORAL DAILY
Qty: 15 TABLET | Refills: 0 | Status: SHIPPED | OUTPATIENT
Start: 2024-10-08 | End: 2024-10-18

## 2024-10-08 RX ORDER — LEVOFLOXACIN 5 MG/ML
750 INJECTION, SOLUTION INTRAVENOUS ONCE
Status: COMPLETED | OUTPATIENT
Start: 2024-10-08 | End: 2024-10-08

## 2024-10-08 ASSESSMENT — COLUMBIA-SUICIDE SEVERITY RATING SCALE - C-SSRS
6. HAVE YOU EVER DONE ANYTHING, STARTED TO DO ANYTHING, OR PREPARED TO DO ANYTHING TO END YOUR LIFE?: NO
2. HAVE YOU ACTUALLY HAD ANY THOUGHTS OF KILLING YOURSELF?: NO
1. IN THE PAST MONTH, HAVE YOU WISHED YOU WERE DEAD OR WISHED YOU COULD GO TO SLEEP AND NOT WAKE UP?: NO

## 2024-10-08 ASSESSMENT — ENCOUNTER SYMPTOMS
PALPITATIONS: 0
CHILLS: 0
CHEST TIGHTNESS: 0
FEVER: 0

## 2024-10-08 ASSESSMENT — PAIN SCALES - GENERAL
PAINLEVEL_OUTOF10: 0 - NO PAIN

## 2024-10-08 ASSESSMENT — PAIN - FUNCTIONAL ASSESSMENT: PAIN_FUNCTIONAL_ASSESSMENT: 0-10

## 2024-10-08 NOTE — PROGRESS NOTES
Subjective   History of Present Illness: Wojciech Kern is a 74 y.o. male. They present today with a chief complaint of nasal congestion, productive cough, hemoptysis, SOB x 3 days. States that he coughs up dark colored blood almost every time he coughs but didn't happen today.       Past Medical History  Allergies as of 10/08/2024 - Reviewed 10/08/2024   Allergen Reaction Noted    Penicillins Anaphylaxis and Hives 04/06/2023    Sulfa (sulfonamide antibiotics) Hives 04/06/2023       (Not in a hospital admission)       Past Medical History:   Diagnosis Date    AICD (automatic cardioverter/defibrillator) present 04/06/2023    Atherosclerotic heart disease of native coronary artery without angina pectoris 10/28/2015    CHF (congestive heart failure)     Chronic kidney disease, stage 3 unspecified (Multi) 12/19/2019    Chronic renal failure (CRF), stage 4 (severe) (Multi) 03/22/2024    Community acquired pneumonia 04/06/2023    Cortical age-related cataract, left eye 03/31/2016    Dysphagia, unspecified 08/20/2015    Dysuria 04/06/2023    Emphysema/COPD (Multi) 04/06/2023    Essential (primary) hypertension 02/26/2018    Gastric ulcer 04/06/2023    -H pylori     Gastritis 04/06/2023    Hemoptysis 05/17/2016    HFrEF (heart failure with reduced ejection fraction) 04/06/2023    Kidney stone on left side 04/06/2023    Melena 04/06/2023    NUD (nonulcer dyspepsia) 04/06/2023    Personal history of peptic ulcer disease 05/23/2019    Personal history of pneumonia (recurrent) 03/07/2016    Prurigo nodularis 04/06/2023    Stage 3a chronic kidney disease (Multi) 04/06/2023    Urinary retention 04/06/2023       Past Surgical History:   Procedure Laterality Date    CATARACT EXTRACTION Bilateral 03/26/2016    Cataract Surgery    CORONARY ARTERY BYPASS GRAFT  02/26/2018    CABG        reports that he quit smoking about 26 years ago. His smoking use included cigarettes. He has been exposed to tobacco smoke. He has never used  "smokeless tobacco. He reports that he does not currently use alcohol. He reports that he does not currently use drugs.    Review of Systems  Review of Systems   Constitutional:  Negative for chills and fever.   Respiratory:  Negative for chest tightness.    Cardiovascular:  Negative for chest pain, palpitations and leg swelling.                                  Objective    Vitals:    10/08/24 1027   BP: 133/76   BP Location: Left arm   Patient Position: Sitting   BP Cuff Size: Adult   Pulse: 67   Resp: 16   Temp: 36.8 °C (98.3 °F)   TempSrc: Oral   SpO2: 97%   Weight: 68.9 kg (152 lb)   Height: 1.753 m (5' 9\")     No LMP for male patient.    Physical Exam  HENT:      Head: Normocephalic and atraumatic.      Nose: Nose normal.      Mouth/Throat:      Mouth: Mucous membranes are moist.   Eyes:      Extraocular Movements: Extraocular movements intact.      Conjunctiva/sclera: Conjunctivae normal.      Pupils: Pupils are equal, round, and reactive to light.   Cardiovascular:      Rate and Rhythm: Normal rate and regular rhythm.   Pulmonary:      Effort: Pulmonary effort is normal. No tachypnea.      Breath sounds: Decreased breath sounds present. No wheezing.   Skin:     General: Skin is warm.   Neurological:      Mental Status: He is alert and oriented to person, place, and time.   Psychiatric:         Mood and Affect: Mood normal.         Behavior: Behavior normal.             Point of Care Test & Imaging Results from this visit  Results for orders placed or performed in visit on 10/08/24   POCT Covid-19 Rapid Antigen   Result Value Ref Range    POC MARISA-COV-2 AG  Presumptive negative test for SARS-CoV-2 (no antigen detected)     Presumptive negative test for SARS-CoV-2 (no antigen detected)   POCT Influenza A/B manually resulted   Result Value Ref Range    POC Rapid Influenza A Negative Negative    POC Rapid Influenza B Negative Negative      No results found.    Diagnostic study results (if any) were reviewed by " Alin Brown PA-C.    Assessment/Plan   Allergies, medications, history, and pertinent labs/EKGs/Imaging reviewed by Alin Brown PA-C.     Medical Decision Making  Recommended to proceed to the ED for further evaluation of hemoptysis. Pt agreed to the plan of care. He'll proceed to [facility] ED.    Orders and Diagnoses  Diagnoses and all orders for this visit:  Hemoptysis  -     XR chest 2 views; Future  Exposure to confirmed case of COVID-19  -     POCT Covid-19 Rapid Antigen  Acute cough  -     POCT Covid-19 Rapid Antigen  -     POCT Influenza A/B manually resulted      Medical Admin Record      Patient disposition: ED    Electronically signed by Alin Brown PA-C  10:47 AM

## 2024-10-08 NOTE — ED PROVIDER NOTES
HPI   Chief Complaint   Patient presents with    Coughing Up Blood       Chief complaint: Hemoptysis    History of present illness: Patient is a 74-year-old male with history of congestive heart failure, COPD, coronary artery disease hypertension hyperlipidemia presenting to the emergency department with complaints of hemoptysis.  According to the patient, his symptoms started several days ago.  Patient states that he has been experiencing a cough which is productive for a bloody sputum.  The patient states that he is also been having increasing shortness of breath and chills.  The patient denies any blood clots.  The patient denies use of any blood thinners.  Initially, the patient presented to urgent care however, the patient was then referred to the emergency department for further evaluation and therapy.  Patient denies having symptoms like this in the past.      History provided by:  Patient and medical records   used: No            Patient History   Past Medical History:   Diagnosis Date    AICD (automatic cardioverter/defibrillator) present 04/06/2023    Atherosclerotic heart disease of native coronary artery without angina pectoris 10/28/2015    CHF (congestive heart failure)     Chronic kidney disease, stage 3 unspecified (Multi) 12/19/2019    Chronic renal failure (CRF), stage 4 (severe) (Multi) 03/22/2024    Community acquired pneumonia 04/06/2023    Cortical age-related cataract, left eye 03/31/2016    Dysphagia, unspecified 08/20/2015    Dysuria 04/06/2023    Emphysema/COPD (Multi) 04/06/2023    Essential (primary) hypertension 02/26/2018    Gastric ulcer 04/06/2023    -H pylori     Gastritis 04/06/2023    Hemoptysis 05/17/2016    HFrEF (heart failure with reduced ejection fraction) 04/06/2023    Kidney stone on left side 04/06/2023    Melena 04/06/2023    NUD (nonulcer dyspepsia) 04/06/2023    Personal history of peptic ulcer disease 05/23/2019    Personal history of pneumonia  (recurrent) 2016    Prurigo nodularis 2023    Stage 3a chronic kidney disease (Multi) 2023    Urinary retention 2023     Past Surgical History:   Procedure Laterality Date    CATARACT EXTRACTION Bilateral 2016    Cataract Surgery    CORONARY ARTERY BYPASS GRAFT  2018    CABG     Family History   Problem Relation Name Age of Onset    Coronary artery disease Mother      Hypertension Father       Social History     Tobacco Use    Smoking status: Former     Current packs/day: 0.00     Types: Cigarettes     Quit date:      Years since quittin.7     Passive exposure: Past    Smokeless tobacco: Never   Substance Use Topics    Alcohol use: Not Currently    Drug use: Not Currently       Physical Exam   ED Triage Vitals [10/08/24 1222]   Temperature Heart Rate Respirations BP   36.8 °C (98.2 °F) 62 18 153/85      Pulse Ox Temp Source Heart Rate Source Patient Position   98 % Temporal Monitor --      BP Location FiO2 (%)     -- --       Physical Exam  Vitals and nursing note reviewed.   Constitutional:       General: He is not in acute distress.     Appearance: He is well-developed.   HENT:      Head: Normocephalic and atraumatic.   Eyes:      Conjunctiva/sclera: Conjunctivae normal.   Cardiovascular:      Rate and Rhythm: Regular rhythm. Bradycardia present.      Heart sounds: No murmur heard.  Pulmonary:      Effort: Pulmonary effort is normal. No respiratory distress.      Breath sounds: Rhonchi present.   Abdominal:      Palpations: Abdomen is soft.      Tenderness: There is no abdominal tenderness.   Musculoskeletal:         General: No swelling.      Cervical back: Neck supple.   Skin:     General: Skin is warm and dry.      Capillary Refill: Capillary refill takes less than 2 seconds.   Neurological:      Mental Status: He is alert.   Psychiatric:         Mood and Affect: Mood normal.           ED Course & MDM   Diagnoses as of 10/17/24 1220   HCAP (healthcare-associated  pneumonia)                 No data recorded                                 Medical Decision Making  Medical decision making: Patient remained stable during his time in the emergency department.  Initially the patient's presentation to the emergency department, and significant concern for a pulmonary embolus given his shortness of breath and hemoptysis.  As result I requested a CT angiography on the patient despite his GFR of only 42.    Patient CBC demonstrated hemoglobin of 9.1 but no other significant abnormalities Chem-7 demonstrated creatinine of 1.68 but no other significant abnormalities LFTs were within normal limits BNP was 700 1358 the patient's troponin and delta troponin were both within normal limits.  The patient's CAT scan angiography of the chest demonstrated a consolidative opacity in the left lower lobe consistent with pneumonia and no other significant acute abnormalities finally, the patient's EKG demonstrated a sinus bradycardia with a heart rate of 48 bpm isoelectric ST segments narrow QRS complexes and a QTc of 418.    Patient presents to the emergency department with complaints of hemoptysis.  Workup was performed as above and demonstrated a left lower lobe pneumonia.  Based on chart review and asking the patient, the patient was recently admitted to the hospital and as a result, the patient is presenting with a healthcare associated pneumonia.  Surprisingly, the patient has an otherwise relatively normal workup in addition, the patient has a curb 65 score which is quite low as result I feel that is reasonable we attempt to treat this patient as an outpatient for his healthcare associated pneumonia.  I gave the patient a dose of levofloxacin during his time in the emergency department.  I gave the patient a prescription for levofloxacin for home use.    Given the patient's advanced age I feel the patient requires close follow-up as a result I contacted the patient's primary care physician  regarding his patient's case they ensured me that the patient could be seen as an outpatient to ensure improvement of his symptoms he was emphasized to the patient if he has worsening symptoms, shortness of breath, fever, or worsening weakness he should return immediately to the emergency department the patient expressed understanding and agreement.  The patient was then discharged home in otherwise stable condition.    Amount and/or Complexity of Data Reviewed  Labs: ordered. Decision-making details documented in ED Course.  Radiology: ordered. Decision-making details documented in ED Course.  ECG/medicine tests: ordered and independent interpretation performed. Decision-making details documented in ED Course.        Procedure  Procedures     Brent Lopez MD  10/17/24 1226       Brent Lopez MD  10/17/24 1226

## 2024-10-08 NOTE — ED TRIAGE NOTES
TRIAGE NOTE   I saw the patient as the Clinician in Triage and performed a brief history and physical exam, established acuity, and ordered appropriate tests to develop basic plan of care. Patient will be seen by an [facility], resident and/or physician who will independently evaluate the patient. Please see subsequent provider notes for further details and disposition.      Brief HPI:   Patient is a 74-year-old male with complicated PMH including COPD (quit smoking 30 years ago), CAD (NSTEMI, on Plavix), HTN, HLD, HFrEF, BPH who presents to the ED at the referral of urgent care for evaluation of hemoptysis, cough and shortness of breath.  He said symptoms began on Saturday.  He developed shakes, chills, cough and nasal congestion.  Today he noticed that he was spitting up bright red blood tinged sputum.  He denies gibran hemoptysis, alcohol use.  Said he went to urgent care and had negative COVID test and was told his chest x-ray did not show pneumonia they advised him to come to the ER for further evaluation.  He denies any chest pain.  He says his shortness of breath is mostly with exertion.  He denies any palpitations or leg swelling but does state that both of his legs feel tingly.  He denies any abdominal pain, dizziness or lightheadedness, dysuria, nausea, vomiting, diarrhea.    Focused Physical exam:   Tall and slender.  Oxygenating well.  Lungs clear.  Heart rate regular.  Abdomen soft and nontender.  Symmetric pulses.    Plan/MDM:   Labs, PE study, likely admit    Please see subsequent provider note for further details and disposition

## 2024-10-09 LAB
ATRIAL RATE: 48 BPM
P AXIS: 14 DEGREES
P OFFSET: 188 MS
P ONSET: 140 MS
PR INTERVAL: 158 MS
Q ONSET: 219 MS
QRS COUNT: 8 BEATS
QRS DURATION: 100 MS
QT INTERVAL: 468 MS
QTC CALCULATION(BAZETT): 418 MS
QTC FREDERICIA: 434 MS
R AXIS: 8 DEGREES
T AXIS: 150 DEGREES
T OFFSET: 453 MS
VENTRICULAR RATE: 48 BPM

## 2024-10-12 LAB
BACTERIA BLD CULT: NORMAL
BACTERIA BLD CULT: NORMAL

## 2024-10-14 ENCOUNTER — APPOINTMENT (OUTPATIENT)
Dept: PRIMARY CARE | Facility: CLINIC | Age: 74
End: 2024-10-14
Payer: MEDICARE

## 2024-10-14 VITALS
OXYGEN SATURATION: 97 % | WEIGHT: 153.6 LBS | BODY MASS INDEX: 22.75 KG/M2 | RESPIRATION RATE: 16 BRPM | SYSTOLIC BLOOD PRESSURE: 122 MMHG | HEIGHT: 69 IN | DIASTOLIC BLOOD PRESSURE: 56 MMHG | HEART RATE: 76 BPM

## 2024-10-14 DIAGNOSIS — J18.9 HOSPITAL ACQUIRED PNA: Primary | ICD-10-CM
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
## 2024-10-14 PROCEDURE — 1123F ACP DISCUSS/DSCN MKR DOCD: CPT | Performed by: FAMILY MEDICINE

## 2024-10-14 PROCEDURE — 1111F DSCHRG MED/CURRENT MED MERGE: CPT | Performed by: FAMILY MEDICINE

## 2024-10-14 PROCEDURE — 3078F DIAST BP <80 MM HG: CPT | Performed by: FAMILY MEDICINE

## 2024-10-14 PROCEDURE — 3074F SYST BP LT 130 MM HG: CPT | Performed by: FAMILY MEDICINE

## 2024-10-14 PROCEDURE — 99214 OFFICE O/P EST MOD 30 MIN: CPT | Performed by: FAMILY MEDICINE

## 2024-10-14 PROCEDURE — 1157F ADVNC CARE PLAN IN RCRD: CPT | Performed by: FAMILY MEDICINE

## 2024-10-14 ASSESSMENT — ENCOUNTER SYMPTOMS
FATIGUE: 0
CHILLS: 0
COUGH: 0
DIZZINESS: 0
DIARRHEA: 0
BLOOD IN STOOL: 0
DYSPHORIC MOOD: 0
DIFFICULTY URINATING: 0
DYSURIA: 0
EYE REDNESS: 0
ABDOMINAL PAIN: 0
HEADACHES: 0
ADENOPATHY: 0
FEVER: 0
SORE THROAT: 0
NERVOUS/ANXIOUS: 0
APPETITE CHANGE: 0
CONSTIPATION: 0
BACK PAIN: 0
EYE PAIN: 0
ARTHRALGIAS: 0
ABDOMINAL DISTENTION: 0
CHEST TIGHTNESS: 0
BRUISES/BLEEDS EASILY: 0
SHORTNESS OF BREATH: 1
WEAKNESS: 0

## 2024-10-14 NOTE — PROGRESS NOTES
"Subjective   Patient ID: Wojciech Kern is a 74 y.o. male who presents for Hospital Follow-up.  Pt here for ER follow up 10/8 for SOB, hemoptysis. He has unremarkable labs but CXR and CT showed bilateral PNA. He  was recently admitted at [facility] and this was felt to be hospital acquired infx. He has been on Levaquin for 10days course. He is feeling better with cough resolved and SOB improving gradually.         Review of Systems   Constitutional:  Negative for appetite change, chills, fatigue and fever.   HENT:  Negative for congestion, hearing loss and sore throat.    Eyes:  Negative for pain, redness and visual disturbance.   Respiratory:  Positive for shortness of breath. Negative for cough and chest tightness.    Cardiovascular:  Negative for chest pain and leg swelling.   Gastrointestinal:  Negative for abdominal distention, abdominal pain, blood in stool, constipation and diarrhea.   Genitourinary:  Negative for difficulty urinating and dysuria.   Musculoskeletal:  Negative for arthralgias and back pain.   Skin:  Negative for rash.   Neurological:  Negative for dizziness, weakness and headaches.   Hematological:  Negative for adenopathy. Does not bruise/bleed easily.   Psychiatric/Behavioral:  Negative for dysphoric mood. The patient is not nervous/anxious.        Objective   /56   Pulse 76   Resp 16   Ht 1.753 m (5' 9\")   Wt 69.7 kg (153 lb 9.6 oz)   SpO2 97%   BMI 22.68 kg/m²    Physical Exam  Constitutional:       General: He is not in acute distress.     Appearance: Normal appearance.   Cardiovascular:      Rate and Rhythm: Normal rate and regular rhythm.      Heart sounds: Normal heart sounds. No murmur heard.  Pulmonary:      Effort: Pulmonary effort is normal.      Breath sounds: Normal breath sounds.   Abdominal:      Palpations: Abdomen is soft.      Tenderness: There is no abdominal tenderness.   Neurological:      Mental Status: He is alert.   Psychiatric:         Mood and Affect: Mood " normal.         Judgment: Judgment normal.           Assessment/Plan   Problem List Items Addressed This Visit       Hospital acquired PNA - Primary   Continue on Levaquin until 10day  course is complete. Check CXR in 3 weeks.    Follow up as planned.

## 2024-10-15 ENCOUNTER — OFFICE VISIT (OUTPATIENT)
Dept: CARDIOLOGY | Facility: HOSPITAL | Age: 74
End: 2024-10-15
Payer: MEDICARE

## 2024-10-15 VITALS
BODY MASS INDEX: 23.11 KG/M2 | HEART RATE: 78 BPM | HEIGHT: 69 IN | OXYGEN SATURATION: 99 % | WEIGHT: 156 LBS | SYSTOLIC BLOOD PRESSURE: 130 MMHG | DIASTOLIC BLOOD PRESSURE: 70 MMHG

## 2024-10-15 DIAGNOSIS — I10 BENIGN ESSENTIAL HYPERTENSION: Chronic | ICD-10-CM

## 2024-10-15 DIAGNOSIS — I25.10 CORONARY ARTERY DISEASE INVOLVING NATIVE HEART WITHOUT ANGINA PECTORIS, UNSPECIFIED VESSEL OR LESION TYPE: Chronic | ICD-10-CM

## 2024-10-15 DIAGNOSIS — Z95.810 AICD (AUTOMATIC CARDIOVERTER/DEFIBRILLATOR) PRESENT: Chronic | ICD-10-CM

## 2024-10-15 DIAGNOSIS — I50.21 ACUTE SYSTOLIC (CONGESTIVE) HEART FAILURE: Primary | ICD-10-CM

## 2024-10-15 PROCEDURE — 93005 ELECTROCARDIOGRAM TRACING: CPT | Performed by: NURSE PRACTITIONER

## 2024-10-15 PROCEDURE — 99214 OFFICE O/P EST MOD 30 MIN: CPT | Performed by: NURSE PRACTITIONER

## 2024-10-15 RX ORDER — TORSEMIDE 100 MG/1
50 TABLET ORAL DAILY
Qty: 45 TABLET | Refills: 3 | Status: SHIPPED | OUTPATIENT
Start: 2024-10-15 | End: 2025-10-10

## 2024-10-15 NOTE — PROGRESS NOTES
Subjective   Wojciech Kern is a 74 y.o. male.    Chief Complaint:  Hypertension, Hyperlipidemia, Cardiomyopathy, and Coronary Artery Disease    Mr. Kern returns for an interval follow up. He is seen in collaboration with Dr. Panchal. He was recently hospitalized for dizziness and bradycardia and was taken off carvedilol. He then developed hospital acquired pneumonia, and is still finishing his course of antibiotics. He seems to be getting around reasonably well. His cough and dyspnea are improving. He denies any exertional chest pain. He offers no other cardiovascular complaints or concerns today. He denies any complaints of chest pain, shortness of breath, lightheadedness, dizziness, palpitations, syncope, orthopnea, paroxysmal nocturnal dyspnea, lower extremity swelling or bleeding concerns.          Review of Systems   All other systems reviewed and are negative.      Objective   Physical Exam  Constitutional:       Appearance: Healthy appearance. In no distress  Pulmonary:      Effort: Pulmonary effort is normal.      Breath sounds: Normal breath sounds.   Cardiovascular:      Normal rate. Regular rhythm. Normal S1. Normal S2.       Murmurs: There is no murmur.      Carotids: right carotid pulse +2, no bruit heard over the right carotid. left carotid pulse +2, no bruit heard over the left carotid.  Pacemaker/ICD Implant site has healed without signs of infection.  Edema:     Peripheral edema absent.   Abdominal:      Palpations: Abdomen is soft.   Musculoskeletal:       Cervical back: Normal range of motion.   Skin:     General: Skin is warm and dry. Normal color and pigmentation   Neurological:      Mental Status: Alert and oriented to person, place and time.   Psychiatric:     Mood and Affect: appropriate mood and appropriate affect.     EKG obtained and reviewed. Sinus rhythm with PVC. Inferior infarct, age undetermined. HR 74.      Lab Review:   Lab Results   Component Value Date     (L)  10/08/2024    K 4.8 10/08/2024     10/08/2024    CO2 23 10/08/2024    BUN 41 (H) 10/08/2024    CREATININE 1.68 (H) 10/08/2024    GLUCOSE 96 10/08/2024    CALCIUM 8.7 10/08/2024     Lab Results   Component Value Date    WBC 6.4 10/08/2024    HGB 9.1 (L) 10/08/2024    HCT 27.2 (L) 10/08/2024    MCV 96 10/08/2024     10/08/2024     Lab Results   Component Value Date    CHOL 111 07/11/2024    TRIG 79 07/11/2024    HDL 48.1 07/11/2024       Assessment/Plan   Mr. 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 Echocardiogram 11/2023 showed an EF of 25-30% with mildly reduced RV systolic function and moderate functional MR. Heart catheterization 9/2022 showed moderate LM disease similar to prior angiography, patent RCA stents to level of RPDA, subintimal stent in RPL occluded, and patent SVG-LAD. He presents today following a recent hospitalization for dizziness/bradycardia, taken off carvedilol, and pneumonia. His VS and EKG remain stable today. His dyspnea is improving. I will have him continue all medications unchanged. He will follow up with us in clinic in 2 months with an echocardiogram the same day. He also as an appointment with the device clinic in December. We will review with Dr. Forbes at that time if he is a candidate to upgrade his device. He knows to call for any concerns.     Of note: he remains off entresto and jardiance due to the cost, and carvedilol due to bradycardia.

## 2024-10-16 LAB
ATRIAL RATE: 74 BPM
P AXIS: 43 DEGREES
P OFFSET: 189 MS
P ONSET: 128 MS
PR INTERVAL: 180 MS
Q ONSET: 218 MS
QRS COUNT: 13 BEATS
QRS DURATION: 116 MS
QT INTERVAL: 422 MS
QTC CALCULATION(BAZETT): 468 MS
QTC FREDERICIA: 452 MS
R AXIS: 3 DEGREES
T AXIS: -42 DEGREES
T OFFSET: 429 MS
VENTRICULAR RATE: 74 BPM

## 2024-10-19 LAB
ATRIAL RATE: 39 BPM
P AXIS: 43 DEGREES
P OFFSET: 189 MS
P ONSET: 126 MS
PR INTERVAL: 174 MS
Q ONSET: 213 MS
QRS COUNT: 7 BEATS
QRS DURATION: 106 MS
QT INTERVAL: 524 MS
QTC CALCULATION(BAZETT): 421 MS
QTC FREDERICIA: 454 MS
R AXIS: -2 DEGREES
T AXIS: 58 DEGREES
T OFFSET: 475 MS
VENTRICULAR RATE: 39 BPM

## 2024-10-23 ENCOUNTER — DOCUMENTATION (OUTPATIENT)
Dept: PRIMARY CARE | Facility: CLINIC | Age: 74
End: 2024-10-23
Payer: MEDICARE

## 2024-10-23 NOTE — PROGRESS NOTES
Pt was identified for TCM due to recent hospital stay. However, he never answered 4 outreach attempts or returned calls. TCM program closed.

## 2024-10-29 ENCOUNTER — DOCUMENTATION (OUTPATIENT)
Dept: PRIMARY CARE | Facility: CLINIC | Age: 74
End: 2024-10-29
Payer: MEDICARE

## 2024-11-06 ENCOUNTER — APPOINTMENT (OUTPATIENT)
Dept: PULMONOLOGY | Facility: CLINIC | Age: 74
End: 2024-11-06
Payer: MEDICARE

## 2024-11-20 ENCOUNTER — TELEPHONE (OUTPATIENT)
Dept: PULMONOLOGY | Facility: CLINIC | Age: 74
End: 2024-11-20
Payer: MEDICARE

## 2024-11-20 NOTE — TELEPHONE ENCOUNTER
This patient called asking for refill on Spiriva Respimat. I spoke with pharmacist at [facility] and they stated RX was closed out, due to patient stating in October, that he was no longer using it. Patient called today asking for new RX for this. I gave him a follow up in January 2025. Thank you

## 2024-11-25 DIAGNOSIS — J44.1 COPD EXACERBATION (MULTI): ICD-10-CM

## 2024-12-10 ENCOUNTER — HOSPITAL ENCOUNTER (OUTPATIENT)
Dept: CARDIOLOGY | Facility: HOSPITAL | Age: 74
Discharge: HOME | End: 2024-12-10
Payer: MEDICARE

## 2024-12-10 DIAGNOSIS — I50.21 ACUTE SYSTOLIC (CONGESTIVE) HEART FAILURE: ICD-10-CM

## 2024-12-10 DIAGNOSIS — I25.10 CORONARY ARTERY DISEASE INVOLVING NATIVE HEART WITHOUT ANGINA PECTORIS, UNSPECIFIED VESSEL OR LESION TYPE: Chronic | ICD-10-CM

## 2024-12-10 PROCEDURE — 93306 TTE W/DOPPLER COMPLETE: CPT | Performed by: INTERNAL MEDICINE

## 2024-12-10 PROCEDURE — 2500000004 HC RX 250 GENERAL PHARMACY W/ HCPCS (ALT 636 FOR OP/ED): Performed by: NURSE PRACTITIONER

## 2024-12-10 PROCEDURE — C8929 TTE W OR WO FOL WCON,DOPPLER: HCPCS

## 2024-12-11 LAB
AORTIC VALVE MEAN GRADIENT: 3 MMHG
AORTIC VALVE PEAK VELOCITY: 1.15 M/S
AV PEAK GRADIENT: 5 MMHG
AVA (PEAK VEL): 2.74 CM2
AVA (VTI): 2.62 CM2
EJECTION FRACTION APICAL 4 CHAMBER: 37.2
EJECTION FRACTION: 26 %
LEFT ATRIUM VOLUME AREA LENGTH INDEX BSA: 48.3 ML/M2
LEFT VENTRICLE INTERNAL DIMENSION DIASTOLE: 10.18 CM (ref 3.5–6)
LEFT VENTRICULAR OUTFLOW TRACT DIAMETER: 2.27 CM
MITRAL VALVE E/A RATIO: 0.68
RIGHT VENTRICLE PEAK SYSTOLIC PRESSURE: 17.5 MMHG
TRICUSPID ANNULAR PLANE SYSTOLIC EXCURSION: 1.4 CM

## 2024-12-13 ENCOUNTER — HOSPITAL ENCOUNTER (OUTPATIENT)
Dept: CARDIOLOGY | Facility: HOSPITAL | Age: 74
Discharge: HOME | End: 2024-12-13
Payer: MEDICARE

## 2024-12-13 DIAGNOSIS — I50.9 CONGESTIVE HEART FAILURE, UNSPECIFIED HF CHRONICITY, UNSPECIFIED HEART FAILURE TYPE: ICD-10-CM

## 2024-12-13 DIAGNOSIS — Z95.810 AICD (AUTOMATIC CARDIOVERTER/DEFIBRILLATOR) PRESENT: ICD-10-CM

## 2024-12-13 PROCEDURE — 93260 PRGRMG DEV EVAL IMPLTBL SYS: CPT

## 2025-01-02 ENCOUNTER — OFFICE VISIT (OUTPATIENT)
Dept: PULMONOLOGY | Facility: CLINIC | Age: 75
End: 2025-01-02
Payer: MEDICARE

## 2025-01-02 VITALS
WEIGHT: 148 LBS | DIASTOLIC BLOOD PRESSURE: 68 MMHG | BODY MASS INDEX: 21.86 KG/M2 | OXYGEN SATURATION: 98 % | SYSTOLIC BLOOD PRESSURE: 114 MMHG | HEART RATE: 70 BPM | TEMPERATURE: 97.2 F

## 2025-01-02 DIAGNOSIS — J44.9 CHRONIC OBSTRUCTIVE PULMONARY DISEASE, UNSPECIFIED COPD TYPE (MULTI): ICD-10-CM

## 2025-01-02 DIAGNOSIS — R91.8 ABNORMAL FINDING ON LUNG IMAGING: Primary | ICD-10-CM

## 2025-01-02 PROCEDURE — 1123F ACP DISCUSS/DSCN MKR DOCD: CPT | Performed by: STUDENT IN AN ORGANIZED HEALTH CARE EDUCATION/TRAINING PROGRAM

## 2025-01-02 PROCEDURE — 3074F SYST BP LT 130 MM HG: CPT | Performed by: STUDENT IN AN ORGANIZED HEALTH CARE EDUCATION/TRAINING PROGRAM

## 2025-01-02 PROCEDURE — 99213 OFFICE O/P EST LOW 20 MIN: CPT | Performed by: STUDENT IN AN ORGANIZED HEALTH CARE EDUCATION/TRAINING PROGRAM

## 2025-01-02 PROCEDURE — 1157F ADVNC CARE PLAN IN RCRD: CPT | Performed by: STUDENT IN AN ORGANIZED HEALTH CARE EDUCATION/TRAINING PROGRAM

## 2025-01-02 PROCEDURE — 3078F DIAST BP <80 MM HG: CPT | Performed by: STUDENT IN AN ORGANIZED HEALTH CARE EDUCATION/TRAINING PROGRAM

## 2025-01-02 PROCEDURE — 1126F AMNT PAIN NOTED NONE PRSNT: CPT | Performed by: STUDENT IN AN ORGANIZED HEALTH CARE EDUCATION/TRAINING PROGRAM

## 2025-01-02 ASSESSMENT — COPD QUESTIONNAIRES
QUESTION1_COUGHFREQUENCY: 3
QUESTION5_HOMEACTIVITIES: 2
QUESTION6_LEAVINGHOUSE: 0 - I AM CONFIDENT LEAVING MY HOME DESPITE MY LUNG CONDITION
QUESTION7_SLEEPQUALITY: 0 - I SLEEP SOUNDLY
QUESTION3_CHESTTIGHTNESS: 0 - MY CHEST DOES NOT FEEL TIGHT AT ALL
QUESTION4_WALKINCLINE: 0 - WHEN I WALK UP A HILL OR ONE FLIGHT OF STAIRS I AM NOT BREATHLESS
QUESTION8_ENERGYLEVEL: 3
CAT_TOTALSCORE: 10
QUESTION2_CHESTPHLEGM: 2

## 2025-01-02 ASSESSMENT — ENCOUNTER SYMPTOMS
WHEEZING: 0
CHILLS: 0
MUSCULOSKELETAL NEGATIVE: 1
COUGH: 1
GASTROINTESTINAL NEGATIVE: 1
FATIGUE: 0
ALLERGIC/IMMUNOLOGIC NEGATIVE: 1
ENDOCRINE NEGATIVE: 1

## 2025-01-02 ASSESSMENT — PAIN SCALES - GENERAL: PAINLEVEL_OUTOF10: 0-NO PAIN

## 2025-01-02 NOTE — PROGRESS NOTES
Department of Medicine I Division of Pulmonary, Critical Care, and Sleep Medicine   [id number] [place] [address]  Phone: [phone]  Fax: [phone]    History of Present Illness   Wojciech Kern is a 74 y.o. male  here for pulmonary follow up visit     1/2/2025   Since the last visit   Was in the ER in October for hemoptysis found to have pneumonia was treated outpatient, no longer has hemoptysis   No other bronchitis or colds   Overall doing fairly well   Staying active   Pulmonary medications: spiriva --having issues using it and too expensive     5/6/2024  Since the last visit   He was hospitalized 2/18/24--2/21/24 for increased dyspnea--treated for CHF exacerbation;  had just returned from trip to [place]   Does endorse persistent cough  Walks a mile a day--working on getting back to the [facility] center     Pulmonary medications: spiriva (has not had for a few months)     11/2023  Referred by:  Dr. Flores.  I have independently interviewed and examined the patient in the office and reviewed available records.  He is here for review of his CT scan but notes he is more concerned about the insomnia he is having   Recent admission to [facility] 8/20/2023--8/24/23 for pneumonia   Endorses 3 admissions for pneumonia in the last year   Does endorse chronic congestion and chronic cough with clear phlegm.   Chart diagnosis of   COPD--> diagnosed several years ago.  ? Wheezing   Denies any trouble swallowing   Constant runny nose, recurrent bronchitis   Goes to bed at 11pm  Does sometimes drink caffeinated drinks (coffee)  after noon  Takes OTC sleep aid (not sure what it was called), also takes melatonin--when he takes it he is usually able to fall right asleep   Has trouble falling asleep and then also waking up and can't get back to sleep  Endorses having to wake up frequently to urinate   Also drinking a lot of water (endorses leg cramping at night time), drinks close to bedtime, states if  he doesn't he gets a lot of cramping in his legs       Pulmonary medications:  albuterol inh (rarely)   CAT score: 21   PMH: CAD CABG and VSD repair in 2015,  COPD, HFrEF, CKD3a, insomnia, macular degeneration  SH: quit smoking in 1998,  1ppd for 20 years.  Occupation:  .  Used to restore historical homes. (Couple of years ago). 2 dogs  FH:  father lived to 89, paternal uncle: lung cancer        Review of Systems  Review of Systems   Constitutional:  Negative for chills and fatigue.   Respiratory:  Positive for cough. Negative for wheezing.    Cardiovascular:  Negative for chest pain.   Gastrointestinal: Negative.    Endocrine: Negative.    Genitourinary:         Urinates frequently at night   Musculoskeletal: Negative.    Allergic/Immunologic: Negative.      All other review of systems are negative and/or non-contributory.    Past Medical History   He has a past medical history of AICD (automatic cardioverter/defibrillator) present (04/06/2023), Atherosclerotic heart disease of native coronary artery without angina pectoris (10/28/2015), CHF (congestive heart failure), Chronic kidney disease, stage 3 unspecified (Multi) (12/19/2019), Chronic renal failure (CRF), stage 4 (severe) (Multi) (03/22/2024), Community acquired pneumonia (04/06/2023), Cortical age-related cataract, left eye (03/31/2016), Dysphagia, unspecified (08/20/2015), Dysuria (04/06/2023), Emphysema/COPD (Multi) (04/06/2023), Essential (primary) hypertension (02/26/2018), Gastric ulcer (04/06/2023), Gastritis (04/06/2023), Hemoptysis (05/17/2016), HFrEF (heart failure with reduced ejection fraction) (04/06/2023), Kidney stone on left side (04/06/2023), Melena (04/06/2023), NUD (nonulcer dyspepsia) (04/06/2023), Personal history of peptic ulcer disease (05/23/2019), Personal history of pneumonia (recurrent) (03/07/2016), Prurigo nodularis (04/06/2023), Stage 3a chronic kidney disease (Multi) (04/06/2023), and Urinary retention  (04/06/2023).    Immunizations     Immunization History   Administered Date(s) Administered    COVID-19, mRNA, LNP-S, PF, 30 mcg/0.3 mL dose 03/15/2021, 04/05/2021, 12/17/2021    Flu vaccine, quadrivalent, high-dose, preservative free, age 65y+ (FLUZONE) 12/01/2023    Influenza, seasonal, injectable 09/19/2022    Pfizer Gray Cap SARS-CoV-2 06/03/2022    Pneumococcal polysaccharide vaccine, 23-valent, age 2 years and older (PNEUMOVAX 23) 03/16/2015, 11/23/2022    Tdap vaccine, age 7 year and older (BOOSTRIX, ADACEL) 10/11/2014, 02/02/2017, 04/09/2019    Zoster, live 12/08/2014       Medications and Allergies     Current Outpatient Medications   Medication Instructions    aspirin 81 mg EC tablet 1 tablet, Daily    clopidogrel (PLAVIX) 75 mg, oral, Daily    lisinopril 10 mg, oral, Daily, Take first dose 36 hours after your last dose of entresto    multivitamin (MULTIPLE VITAMINS ORAL) 1 tablet, Daily    rosuvastatin (CRESTOR) 20 mg, oral, Daily    spironolactone (ALDACTONE) 12.5 mg, oral, Daily    tiotropium (Spiriva Respimat) 2.5 mcg/actuation inhaler 2 puffs, inhalation, Daily RT    torsemide (DEMADEX) 50 mg, oral, Daily      Penicillins and Sulfa (sulfonamide antibiotics)    Social History   He reports that he quit smoking about 27 years ago. His smoking use included cigarettes. He has been exposed to tobacco smoke. He has never used smokeless tobacco. He reports that he does not currently use alcohol. He reports that he does not currently use drugs.    Smoking History: see hpi   Exposure/Job History: see hpi     Family History     Family History   Problem Relation Name Age of Onset    Coronary artery disease Mother      Hypertension Father             Surgical History   He has a past surgical history that includes Coronary artery bypass graft (02/26/2018) and Cataract extraction (Bilateral, 03/26/2016).    Physical Exam   /68   Pulse 70   Temp 36.2 °C (97.2 °F)   Wt 67.1 kg (148 lb)   SpO2 98%   BMI  21.86 kg/m²      Physical Exam  Constitutional:       Appearance: Normal appearance.   HENT:      Head: Normocephalic and atraumatic.   Eyes:      Pupils: Pupils are equal, round, and reactive to light.   Cardiovascular:      Rate and Rhythm: Normal rate and regular rhythm.   Pulmonary:      Effort: Pulmonary effort is normal.      Breath sounds: Normal breath sounds.   Skin:     Findings: No rash.   Neurological:      General: No focal deficit present.      Mental Status: He is alert and oriented to person, place, and time.   Psychiatric:         Mood and Affect: Mood normal.          Results   Pulmonary Function Tests:      2023 (done after visit)   FEV1/FVC: 75  post FEV1: 70%   T.79 (128%)   RV/T (150%)   DLCO: 52% pred   DLCO corrected for alveolar volume: 74% pred     My interpretation:  no obstruction, no bronchodilator response. Evidence of air trapping, moderately reduced DLCO, but is only mildly reduced when corrected for alveolar volume.        Chest Radiograph:  XR chest 1 view 2023    Narrative  Interpreted By:  YULIANA DENNISON DO  MRN: [id number]  Patient Name: DANICA POLANCO    STUDY:  CHEST 1 VIEW;  2023 10:58 pm    INDICATION:  hemoptysis .    COMPARISON:  2023    ACCESSION NUMBER(S):  [id number]    ORDERING CLINICIAN:  NATASHA SAMUEL    FINDINGS:  AP radiograph of the chest was provided.    Median sternotomy wires and unipolar pacemaker/AICD again noted.    CARDIOMEDIASTINAL SILHOUETTE:  Enlarged, similar to prior imaging with calcifications of the aortic  arch.    LUNGS:  Bilateral airspace opacities appear progressed from prior imaging. No  large pleural effusion or pneumothorax identified.    ABDOMEN:  No remarkable upper abdominal findings.    BONES:  No acute osseous changes.    Impression  1.  Enlarged cardiac silhouette and bilateral lower lobe airspace  opacities which appear progressed from prior imaging. Correlation for  bilateral infection  recommended.        MACRO:  None      Chest CT Scan:    10/2024   IMPRESSION:  1. No evidence of acute pulmonary embolism.  2. New/increased consolidative airspace opacity in the left lower  lobe compared to prior exam in February of 2024. Correlate with any  symptoms of new/developing pneumonia.  3. Right-sided pleural effusion present on previous exam in February of 2024 has resolved, although there may be some subtle volume  loss/consolidation also present in the inferior medial aspect of the  right lower lobe and right middle lobe.  4. Posterior inferiorly oriented aneurysms arising from the posterior  wall of the left ventricle is similar in appearance to prior exam in  February of 2024.  5. Upper lobe predominant paraseptal and centrilobular emphysematous  changes.  2/18/2024  IMPRESSION:  1.  No evidence of pulmonary emboli. There is similar appearance of  small right pleural effusion. Interval development of diffuse  interseptal thickening suggestive of pulmonary venous congestion.  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 Cardiology follow-up is recommended.  Enlarged main pulmonary trunk suggestive of pulmonary arterial  hypertension.  2. Moderate centrilobular emphysema        CT Chest 9/08/23  IMPRESSION:  1. Significant interval decrease in extent of opacities in the right  lower lobe most consistent with improving pneumonia. Similar  opacities in the left lower lobe posterior segment compared to most  recent 2 CT studies dating back to 07/05/2023, with interval  improvement relative to CT 11/21/2022. Continued CT follow-up is  suggested, for example in 3 months.  2. Additional chronic findings are stable compared to prior CT as  detailed above including large left ventricular free wall aneurysm  and moderate pulmonary emphysema. No new abnormality in the chest is  identified.  CT  angio chest for pulmonary embolism 08/20/2023    Narrative  Interpreted By:  IVAN DURAN DO  MRN: [id number]  Patient Name: DANICA POLANCO    STUDY:  CT ANGIO CHEST FOR PE;  8/20/2023 11:42 pm    INDICATION:  hemoptysis, hypoxia    COMPARISON:  CT angiogram chest 07/05/2023. CT chest 05/18/2016, 11/21/2022. Chest  x-ray 08/20/2023. CT urogram 03/05/2019.    ACCESSION NUMBER(S):  [id number]    ORDERING CLINICIAN:  NATASHA SAMUEL    TECHNIQUE:  Helical data acquisition of the chest was obtained following the  uneventful administration of  50 milliliter OMNIPAQUE 350intravenous  contrast material. Images were reformatted in axial, coronal, and  sagittal planes. MIP images were created and reviewed.    FINDINGS:  POTENTIAL LIMITATIONS OF THE STUDY: Motion artifact.    HEART AND VESSELS:  No discrete filling defects within the main pulmonary artery or its  branches.    No thoracic aortic aneurysm.    The heart is enlarged. The patient is status post open heart surgery  .  Redemonstration of peripherally calcified aneurysm at the left  ventricular wall, not significantly changed in the interval. No  evidence of pericardial effusion.    MEDIASTINUM AND ALEXANDRO, LOWER NECK AND AXILLA:  The visualized thyroid gland is within normal limits.    No evidence of thoracic lymphadenopathy by CT criteria.    LUNGS AND AIRWAYS:  The trachea and central airways are patent.    The evaluation of the lungs is degraded by motion artifact. There are  bilateral emphysematous changes. Interval increase in the airspace  opacities at the right middle lobe, and left lower lobe. Trace right  pleural effusion. No pneumothorax.  Redemonstration of 6 mm nodule at the right lung apex, not  significantly changed since prior CT 05/18/2016 (series 606, axial  image 83).    UPPER ABDOMEN:  There is a 1.2 cm cyst at the right hepatic lobe.  There is cholelithiasis.  There is a 1.5 cm cortical hyperdense lesion at the right kidney,  previously described as a  proteinaceous cyst.    CHEST WALL AND OSSEOUS STRUCTURES:  The battery pack from a pacemaker is noted at soft tissues of the  left lateral lower chest wall.  Multilevel degenerative changes at the spine.    Impression  1. No evidence of pulmonary emboli.  2. Interval increase in the bilateral airspace opacities, suggestive  of worsening pneumonia.  3. Trace right pleural effusion.  4. Emphysema.  5. Cardiomegaly. Redemonstration of peripherally calcified aneurysm  at the left ventricular wall.       ECHO:  10/25/2023  CONCLUSIONS:   1. Left ventricular systolic function is severely decreased with a 25-30% estimated ejection fraction.   2. There is global hypokinesis of the left ventricle with minor regional variations.   3. The inferior and infero lateral wall are the most hypokinetic. 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 There is a 1.4 x 1.4 cm echodensity in the submitral apparatus (clip 110). DDx includes calcified papillary muscle (favored) v less likely thrombus.   4. There is moderate, functional mitral valve regurgitation which is eccentrically directed. The degree of mitral regurgitation may be underestimated due to the eccentricity of the jet.   5. There is mildly reduced right ventricular systolic function.   6. The left atrium is severely dilated.   7. RVSP within normal limits.   8. Compared with study from 9/10/2022, there is now at least moderate functional mitral regurgitation, which may be underestimated due to the eccentricity of the jet. A bright echodensity in the submitral apparatus is seen in some views more easily on the current study, though it also appears in certain clips on the prior study (clip 39) and favored to be a calcified papillary muscle. Comparison of the size of the LV aneurysm is challenging due to technical limitations.          Labs:  Lab Results   Component Value Date    WBC 6.4 10/08/2024    HGB 9.1 (L) 10/08/2024    HCT 27.2 (L) 10/08/2024    MCV 96  "10/08/2024     10/08/2024       Lab Results   Component Value Date    CREATININE 1.68 (H) 10/08/2024    BUN 41 (H) 10/08/2024     (L) 10/08/2024    K 4.8 10/08/2024     10/08/2024    CO2 23 10/08/2024        No results found for: \"ALAN\", \"RF\", \"SEDRATE\"     IgE: No results found for: \"IGE\"   Respiratory Allergy Panel:  AEC:   Eosinophils Absolute (x10*3/uL)   Date Value   10/08/2024 0.09     Eosinophils % (%)   Date Value   10/08/2024 1.4       Cultures:  No results found for: \"AFBCX\"   No results found for: \"RESPCULTCYFI\"    No results found for the last 90 days.  C     Assessment and Plan       Wojciech Kern is a 74 y.o. year old male patient who is here for pulmonary follow up  (given multiple hospitalizations in the last year) with most recent being in February 2024 (CHF exacerbation); following with cardiology     BL LL  L> R parenchymal infiltrates changes --with recent pneumonia   -repeat CT chest recommend in 3 months from prior --pt declines as he does not want to drive in the winter, agreeable for a 6 month interval scan which is ordered     Recurrent pneumonias  -perhaps related to aspiration although patient denies history supporting this  -can consider quantitative immunoglobulins and strep antibodies if recurs   -stay up to date on vaccinations     Emphysema   -I reviewed his PFTs and there is no obvious obstruction on PFTs but there is airtrapping; was started on spiriva by PCP,  -continue spiriva--clinical pharmacy consult to switch to something more cost effective           Follow-up:  6 months or sooner if needed     Viviana Mccray MD  01/02/2025   "

## 2025-01-02 NOTE — PATIENT INSTRUCTIONS
Thank you for visiting the Pulmonary Clinic today.   Your breathing medications: keep the spiriva for now and the clinical pharmacy consult team will call you to switch to something more cost effective   Tests: CT chest in March to follow up pneumonis   Return in 6 months   If you have questions or concerns, call [phone] (option 4)

## 2025-01-05 ENCOUNTER — HOSPITAL ENCOUNTER (INPATIENT)
Facility: HOSPITAL | Age: 75
LOS: 4 days | Discharge: HOME HEALTH | End: 2025-01-09
Attending: INTERNAL MEDICINE | Admitting: INTERNAL MEDICINE
Payer: MEDICARE

## 2025-01-05 ENCOUNTER — APPOINTMENT (OUTPATIENT)
Dept: CARDIOLOGY | Facility: HOSPITAL | Age: 75
End: 2025-01-05
Payer: MEDICARE

## 2025-01-05 ENCOUNTER — APPOINTMENT (OUTPATIENT)
Dept: RADIOLOGY | Facility: HOSPITAL | Age: 75
End: 2025-01-05
Payer: MEDICARE

## 2025-01-05 ENCOUNTER — HOSPITAL ENCOUNTER (EMERGENCY)
Facility: HOSPITAL | Age: 75
Discharge: ANOTHER HOSPITAL | End: 2025-01-05
Attending: STUDENT IN AN ORGANIZED HEALTH CARE EDUCATION/TRAINING PROGRAM | Admitting: INTERNAL MEDICINE
Payer: MEDICARE

## 2025-01-05 VITALS
BODY MASS INDEX: 22.33 KG/M2 | SYSTOLIC BLOOD PRESSURE: 98 MMHG | RESPIRATION RATE: 20 BRPM | WEIGHT: 156 LBS | HEIGHT: 70 IN | OXYGEN SATURATION: 97 % | HEART RATE: 70 BPM | DIASTOLIC BLOOD PRESSURE: 81 MMHG

## 2025-01-05 DIAGNOSIS — I50.21 ACUTE SYSTOLIC (CONGESTIVE) HEART FAILURE: ICD-10-CM

## 2025-01-05 DIAGNOSIS — I47.20 VENTRICULAR TACHYCARDIA (MULTI): ICD-10-CM

## 2025-01-05 DIAGNOSIS — Z45.02 ICD (IMPLANTABLE CARDIOVERTER-DEFIBRILLATOR) DISCHARGE: ICD-10-CM

## 2025-01-05 DIAGNOSIS — R00.1 BRADYCARDIA: ICD-10-CM

## 2025-01-05 DIAGNOSIS — R42 POSTURAL DIZZINESS: ICD-10-CM

## 2025-01-05 DIAGNOSIS — I21.4 NSTEMI (NON-ST ELEVATED MYOCARDIAL INFARCTION) (MULTI): ICD-10-CM

## 2025-01-05 DIAGNOSIS — Z45.02 ICD (IMPLANTABLE CARDIOVERTER-DEFIBRILLATOR) DISCHARGE: Primary | ICD-10-CM

## 2025-01-05 DIAGNOSIS — I50.20 HFREF (HEART FAILURE WITH REDUCED EJECTION FRACTION): Chronic | ICD-10-CM

## 2025-01-05 DIAGNOSIS — I47.20 VT (VENTRICULAR TACHYCARDIA) (MULTI): Primary | ICD-10-CM

## 2025-01-05 LAB
ABO GROUP (TYPE) IN BLOOD: NORMAL
ANION GAP BLDV CALCULATED.4IONS-SCNC: 11 MMOL/L (ref 10–25)
ANION GAP BLDV CALCULATED.4IONS-SCNC: 14 MMOL/L (ref 10–25)
ANION GAP SERPL CALC-SCNC: 13 MMOL/L (ref 10–20)
ANTIBODY SCREEN: NORMAL
APTT PPP: 22 SECONDS (ref 27–38)
BASE EXCESS BLDV CALC-SCNC: 0.5 MMOL/L (ref -2–3)
BASE EXCESS BLDV CALC-SCNC: 2.4 MMOL/L (ref -2–3)
BASOPHILS # BLD AUTO: 0.03 X10*3/UL (ref 0–0.1)
BASOPHILS NFR BLD AUTO: 0.3 %
BODY TEMPERATURE: 37 DEGREES CELSIUS
BODY TEMPERATURE: 37 DEGREES CELSIUS
BUN SERPL-MCNC: 82 MG/DL (ref 6–23)
CA-I BLDV-SCNC: 1.22 MMOL/L (ref 1.1–1.33)
CA-I BLDV-SCNC: 1.23 MMOL/L (ref 1.1–1.33)
CALCIUM SERPL-MCNC: 9.2 MG/DL (ref 8.6–10.3)
CARDIAC TROPONIN I PNL SERPL HS: 1035 NG/L (ref 0–20)
CARDIAC TROPONIN I PNL SERPL HS: 1733 NG/L (ref 0–20)
CARDIAC TROPONIN I PNL SERPL HS: 3507 NG/L (ref 0–20)
CARDIAC TROPONIN I PNL SERPL HS: 4620 NG/L (ref 0–20)
CARDIAC TROPONIN I PNL SERPL HS: 5744 NG/L (ref 0–20)
CARDIAC TROPONIN I PNL SERPL HS: 89 NG/L (ref 0–20)
CHLORIDE BLDV-SCNC: 104 MMOL/L (ref 98–107)
CHLORIDE BLDV-SCNC: 98 MMOL/L (ref 98–107)
CHLORIDE SERPL-SCNC: 100 MMOL/L (ref 98–107)
CO2 SERPL-SCNC: 27 MMOL/L (ref 21–32)
CREAT SERPL-MCNC: 2.74 MG/DL (ref 0.5–1.3)
EGFRCR SERPLBLD CKD-EPI 2021: 23 ML/MIN/1.73M*2
EOSINOPHIL # BLD AUTO: 0.02 X10*3/UL (ref 0–0.4)
EOSINOPHIL NFR BLD AUTO: 0.2 %
ERYTHROCYTE [DISTWIDTH] IN BLOOD BY AUTOMATED COUNT: 13.1 % (ref 11.5–14.5)
ERYTHROCYTE [DISTWIDTH] IN BLOOD BY AUTOMATED COUNT: 13.3 % (ref 11.5–14.5)
GLUCOSE BLDV-MCNC: 182 MG/DL (ref 74–99)
GLUCOSE BLDV-MCNC: 92 MG/DL (ref 74–99)
GLUCOSE SERPL-MCNC: 178 MG/DL (ref 74–99)
HCO3 BLDV-SCNC: 25.4 MMOL/L (ref 22–26)
HCO3 BLDV-SCNC: 28.3 MMOL/L (ref 22–26)
HCT VFR BLD AUTO: 29.7 % (ref 41–52)
HCT VFR BLD AUTO: 31.6 % (ref 41–52)
HCT VFR BLD EST: 32 % (ref 41–52)
HCT VFR BLD EST: 33 % (ref 41–52)
HGB BLD-MCNC: 10.2 G/DL (ref 13.5–17.5)
HGB BLD-MCNC: 10.2 G/DL (ref 13.5–17.5)
HGB BLDV-MCNC: 10.8 G/DL (ref 13.5–17.5)
HGB BLDV-MCNC: 11.1 G/DL (ref 13.5–17.5)
IMM GRANULOCYTES # BLD AUTO: 0.03 X10*3/UL (ref 0–0.5)
IMM GRANULOCYTES NFR BLD AUTO: 0.3 % (ref 0–0.9)
INHALED O2 CONCENTRATION: 21 %
INHALED O2 CONCENTRATION: 21 %
INR PPP: 1.1 (ref 0.9–1.1)
LACTATE BLDV-SCNC: 0.6 MMOL/L (ref 0.4–2)
LACTATE BLDV-SCNC: 1.8 MMOL/L (ref 0.4–2)
LYMPHOCYTES # BLD AUTO: 0.69 X10*3/UL (ref 0.8–3)
LYMPHOCYTES NFR BLD AUTO: 7.8 %
MAGNESIUM SERPL-MCNC: 2.67 MG/DL (ref 1.6–2.4)
MCH RBC QN AUTO: 31.1 PG (ref 26–34)
MCH RBC QN AUTO: 32.3 PG (ref 26–34)
MCHC RBC AUTO-ENTMCNC: 32.3 G/DL (ref 32–36)
MCHC RBC AUTO-ENTMCNC: 34.3 G/DL (ref 32–36)
MCV RBC AUTO: 94 FL (ref 80–100)
MCV RBC AUTO: 96 FL (ref 80–100)
MONOCYTES # BLD AUTO: 0.57 X10*3/UL (ref 0.05–0.8)
MONOCYTES NFR BLD AUTO: 6.5 %
NEUTROPHILS # BLD AUTO: 7.48 X10*3/UL (ref 1.6–5.5)
NEUTROPHILS NFR BLD AUTO: 84.9 %
NRBC BLD-RTO: 0 /100 WBCS (ref 0–0)
NRBC BLD-RTO: 0 /100 WBCS (ref 0–0)
OXYHGB MFR BLDV: 23.9 % (ref 45–75)
OXYHGB MFR BLDV: 82.2 % (ref 45–75)
PCO2 BLDV: 41 MM HG (ref 41–51)
PCO2 BLDV: 49 MM HG (ref 41–51)
PH BLDV: 7.37 PH (ref 7.33–7.43)
PH BLDV: 7.4 PH (ref 7.33–7.43)
PLATELET # BLD AUTO: 186 X10*3/UL (ref 150–450)
PLATELET # BLD AUTO: 214 X10*3/UL (ref 150–450)
PO2 BLDV: 16 MM HG (ref 35–45)
PO2 BLDV: 53 MM HG (ref 35–45)
POTASSIUM BLDV-SCNC: 4.5 MMOL/L (ref 3.5–5.3)
POTASSIUM BLDV-SCNC: 5 MMOL/L (ref 3.5–5.3)
POTASSIUM SERPL-SCNC: 5 MMOL/L (ref 3.5–5.3)
PROTHROMBIN TIME: 12.9 SECONDS (ref 9.8–12.8)
RBC # BLD AUTO: 3.16 X10*6/UL (ref 4.5–5.9)
RBC # BLD AUTO: 3.28 X10*6/UL (ref 4.5–5.9)
RH FACTOR (ANTIGEN D): NORMAL
SAO2 % BLDV: 24 % (ref 45–75)
SAO2 % BLDV: 84 % (ref 45–75)
SODIUM BLDV-SCNC: 135 MMOL/L (ref 136–145)
SODIUM BLDV-SCNC: 136 MMOL/L (ref 136–145)
SODIUM SERPL-SCNC: 135 MMOL/L (ref 136–145)
UFH PPP CHRO-ACNC: 0.4 IU/ML
WBC # BLD AUTO: 8.4 X10*3/UL (ref 4.4–11.3)
WBC # BLD AUTO: 8.8 X10*3/UL (ref 4.4–11.3)

## 2025-01-05 PROCEDURE — 83540 ASSAY OF IRON: CPT

## 2025-01-05 PROCEDURE — 96374 THER/PROPH/DIAG INJ IV PUSH: CPT | Mod: XS

## 2025-01-05 PROCEDURE — 84484 ASSAY OF TROPONIN QUANT: CPT

## 2025-01-05 PROCEDURE — 84484 ASSAY OF TROPONIN QUANT: CPT | Performed by: STUDENT IN AN ORGANIZED HEALTH CARE EDUCATION/TRAINING PROGRAM

## 2025-01-05 PROCEDURE — 71045 X-RAY EXAM CHEST 1 VIEW: CPT | Mod: FOREIGN READ | Performed by: RADIOLOGY

## 2025-01-05 PROCEDURE — 83735 ASSAY OF MAGNESIUM: CPT | Performed by: STUDENT IN AN ORGANIZED HEALTH CARE EDUCATION/TRAINING PROGRAM

## 2025-01-05 PROCEDURE — 93005 ELECTROCARDIOGRAM TRACING: CPT

## 2025-01-05 PROCEDURE — 83735 ASSAY OF MAGNESIUM: CPT

## 2025-01-05 PROCEDURE — 82728 ASSAY OF FERRITIN: CPT

## 2025-01-05 PROCEDURE — 85025 COMPLETE CBC W/AUTO DIFF WBC: CPT | Performed by: STUDENT IN AN ORGANIZED HEALTH CARE EDUCATION/TRAINING PROGRAM

## 2025-01-05 PROCEDURE — 99291 CRITICAL CARE FIRST HOUR: CPT | Performed by: STUDENT IN AN ORGANIZED HEALTH CARE EDUCATION/TRAINING PROGRAM

## 2025-01-05 PROCEDURE — 85730 THROMBOPLASTIN TIME PARTIAL: CPT | Performed by: INTERNAL MEDICINE

## 2025-01-05 PROCEDURE — 82435 ASSAY OF BLOOD CHLORIDE: CPT

## 2025-01-05 PROCEDURE — 2500000001 HC RX 250 WO HCPCS SELF ADMINISTERED DRUGS (ALT 637 FOR MEDICARE OP): Performed by: STUDENT IN AN ORGANIZED HEALTH CARE EDUCATION/TRAINING PROGRAM

## 2025-01-05 PROCEDURE — 84132 ASSAY OF SERUM POTASSIUM: CPT | Performed by: STUDENT IN AN ORGANIZED HEALTH CARE EDUCATION/TRAINING PROGRAM

## 2025-01-05 PROCEDURE — 84466 ASSAY OF TRANSFERRIN: CPT

## 2025-01-05 PROCEDURE — 2500000004 HC RX 250 GENERAL PHARMACY W/ HCPCS (ALT 636 FOR OP/ED)

## 2025-01-05 PROCEDURE — 84132 ASSAY OF SERUM POTASSIUM: CPT

## 2025-01-05 PROCEDURE — 36415 COLL VENOUS BLD VENIPUNCTURE: CPT | Performed by: STUDENT IN AN ORGANIZED HEALTH CARE EDUCATION/TRAINING PROGRAM

## 2025-01-05 PROCEDURE — 86901 BLOOD TYPING SEROLOGIC RH(D): CPT | Performed by: STUDENT IN AN ORGANIZED HEALTH CARE EDUCATION/TRAINING PROGRAM

## 2025-01-05 PROCEDURE — 85520 HEPARIN ASSAY: CPT

## 2025-01-05 PROCEDURE — 2500000004 HC RX 250 GENERAL PHARMACY W/ HCPCS (ALT 636 FOR OP/ED): Performed by: STUDENT IN AN ORGANIZED HEALTH CARE EDUCATION/TRAINING PROGRAM

## 2025-01-05 PROCEDURE — 93010 ELECTROCARDIOGRAM REPORT: CPT | Performed by: INTERNAL MEDICINE

## 2025-01-05 PROCEDURE — 1210000001 HC SEMI-PRIVATE ROOM DAILY

## 2025-01-05 PROCEDURE — 1100000001 HC PRIVATE ROOM DAILY

## 2025-01-05 PROCEDURE — 85610 PROTHROMBIN TIME: CPT | Performed by: STUDENT IN AN ORGANIZED HEALTH CARE EDUCATION/TRAINING PROGRAM

## 2025-01-05 PROCEDURE — 96374 THER/PROPH/DIAG INJ IV PUSH: CPT

## 2025-01-05 PROCEDURE — 71045 X-RAY EXAM CHEST 1 VIEW: CPT

## 2025-01-05 PROCEDURE — 85027 COMPLETE CBC AUTOMATED: CPT

## 2025-01-05 PROCEDURE — 84484 ASSAY OF TROPONIN QUANT: CPT | Mod: 91 | Performed by: STUDENT IN AN ORGANIZED HEALTH CARE EDUCATION/TRAINING PROGRAM

## 2025-01-05 PROCEDURE — 86901 BLOOD TYPING SEROLOGIC RH(D): CPT

## 2025-01-05 PROCEDURE — 80176 ASSAY OF LIDOCAINE: CPT

## 2025-01-05 RX ORDER — LIDOCAINE HYDROCHLORIDE ANHYDROUS AND DEXTROSE MONOHYDRATE .8; 5 G/100ML; G/100ML
1 INJECTION, SOLUTION INTRAVENOUS CONTINUOUS
Status: DISCONTINUED | OUTPATIENT
Start: 2025-01-06 | End: 2025-01-08

## 2025-01-05 RX ORDER — SPIRONOLACTONE 25 MG/1
12.5 TABLET ORAL DAILY
Status: DISCONTINUED | OUTPATIENT
Start: 2025-01-06 | End: 2025-01-09

## 2025-01-05 RX ORDER — CLOPIDOGREL BISULFATE 75 MG/1
75 TABLET ORAL DAILY
Status: DISCONTINUED | OUTPATIENT
Start: 2025-01-06 | End: 2025-01-08

## 2025-01-05 RX ORDER — ROSUVASTATIN CALCIUM 20 MG/1
20 TABLET, COATED ORAL DAILY
Status: DISCONTINUED | OUTPATIENT
Start: 2025-01-06 | End: 2025-01-09 | Stop reason: HOSPADM

## 2025-01-05 RX ORDER — HEPARIN SODIUM 10000 [USP'U]/100ML
0-4000 INJECTION, SOLUTION INTRAVENOUS CONTINUOUS
Status: DISCONTINUED | OUTPATIENT
Start: 2025-01-05 | End: 2025-01-08

## 2025-01-05 RX ORDER — LIDOCAINE HYDROCHLORIDE ANHYDROUS AND DEXTROSE MONOHYDRATE .8; 5 G/100ML; G/100ML
1 INJECTION, SOLUTION INTRAVENOUS CONTINUOUS
Status: DISCONTINUED | OUTPATIENT
Start: 2025-01-05 | End: 2025-01-05

## 2025-01-05 RX ORDER — NAPROXEN SODIUM 220 MG/1
324 TABLET, FILM COATED ORAL ONCE
Status: COMPLETED | OUTPATIENT
Start: 2025-01-05 | End: 2025-01-05

## 2025-01-05 RX ORDER — HEPARIN SODIUM 10000 [USP'U]/100ML
0-4000 INJECTION, SOLUTION INTRAVENOUS CONTINUOUS
Status: DISCONTINUED | OUTPATIENT
Start: 2025-01-05 | End: 2025-01-05 | Stop reason: HOSPADM

## 2025-01-05 RX ORDER — LISINOPRIL 10 MG/1
10 TABLET ORAL DAILY
Status: DISCONTINUED | OUTPATIENT
Start: 2025-01-06 | End: 2025-01-09 | Stop reason: HOSPADM

## 2025-01-05 RX ORDER — TORSEMIDE 20 MG/1
50 TABLET ORAL DAILY
Status: DISCONTINUED | OUTPATIENT
Start: 2025-01-06 | End: 2025-01-09 | Stop reason: HOSPADM

## 2025-01-05 RX ORDER — LIDOCAINE HYDROCHLORIDE ANHYDROUS AND DEXTROSE MONOHYDRATE .8; 5 G/100ML; G/100ML
30 INJECTION, SOLUTION INTRAVENOUS CONTINUOUS
Status: DISCONTINUED | OUTPATIENT
Start: 2025-01-05 | End: 2025-01-05

## 2025-01-05 RX ORDER — ASPIRIN 81 MG/1
81 TABLET ORAL DAILY
Status: DISCONTINUED | OUTPATIENT
Start: 2025-01-06 | End: 2025-01-09 | Stop reason: HOSPADM

## 2025-01-05 RX ORDER — LIDOCAINE HYDROCHLORIDE ANHYDROUS AND DEXTROSE MONOHYDRATE .8; 5 G/100ML; G/100ML
30 INJECTION, SOLUTION INTRAVENOUS CONTINUOUS
Status: DISCONTINUED | OUTPATIENT
Start: 2025-01-05 | End: 2025-01-05 | Stop reason: HOSPADM

## 2025-01-05 RX ADMIN — HEPARIN SODIUM 800 UNITS/HR: 10000 INJECTION, SOLUTION INTRAVENOUS at 23:12

## 2025-01-05 RX ADMIN — ASPIRIN 324 MG: 81 TABLET, CHEWABLE ORAL at 12:51

## 2025-01-05 RX ADMIN — HEPARIN SODIUM 800 UNITS/HR: 10000 INJECTION, SOLUTION INTRAVENOUS at 17:33

## 2025-01-05 RX ADMIN — LIDOCAINE HYDROCHLORIDE 30 MCG/KG/MIN: 8 INJECTION, SOLUTION INTRAVENOUS at 16:08

## 2025-01-05 SDOH — SOCIAL STABILITY: SOCIAL INSECURITY
WITHIN THE LAST YEAR, HAVE YOU BEEN RAPED OR FORCED TO HAVE ANY KIND OF SEXUAL ACTIVITY BY YOUR PARTNER OR EX-PARTNER?: NO

## 2025-01-05 SDOH — ECONOMIC STABILITY: HOUSING INSECURITY: IN THE LAST 12 MONTHS, WAS THERE A TIME WHEN YOU WERE NOT ABLE TO PAY THE MORTGAGE OR RENT ON TIME?: NO

## 2025-01-05 SDOH — ECONOMIC STABILITY: FOOD INSECURITY: WITHIN THE PAST 12 MONTHS, YOU WORRIED THAT YOUR FOOD WOULD RUN OUT BEFORE YOU GOT THE MONEY TO BUY MORE.: NEVER TRUE

## 2025-01-05 SDOH — SOCIAL STABILITY: SOCIAL INSECURITY: WITHIN THE LAST YEAR, HAVE YOU BEEN AFRAID OF YOUR PARTNER OR EX-PARTNER?: NO

## 2025-01-05 SDOH — SOCIAL STABILITY: SOCIAL INSECURITY: WERE YOU ABLE TO COMPLETE ALL THE BEHAVIORAL HEALTH SCREENINGS?: YES

## 2025-01-05 SDOH — ECONOMIC STABILITY: INCOME INSECURITY: IN THE PAST 12 MONTHS HAS THE ELECTRIC, GAS, OIL, OR WATER COMPANY THREATENED TO SHUT OFF SERVICES IN YOUR HOME?: NO

## 2025-01-05 SDOH — HEALTH STABILITY: MENTAL HEALTH: HOW OFTEN DO YOU HAVE SIX OR MORE DRINKS ON ONE OCCASION?: NEVER

## 2025-01-05 SDOH — SOCIAL STABILITY: SOCIAL INSECURITY: ABUSE: ADULT

## 2025-01-05 SDOH — SOCIAL STABILITY: SOCIAL INSECURITY: DO YOU FEEL UNSAFE GOING BACK TO THE PLACE WHERE YOU ARE LIVING?: NO

## 2025-01-05 SDOH — HEALTH STABILITY: MENTAL HEALTH: HOW MANY DRINKS CONTAINING ALCOHOL DO YOU HAVE ON A TYPICAL DAY WHEN YOU ARE DRINKING?: 1 OR 2

## 2025-01-05 SDOH — ECONOMIC STABILITY: HOUSING INSECURITY: AT ANY TIME IN THE PAST 12 MONTHS, WERE YOU HOMELESS OR LIVING IN A SHELTER (INCLUDING NOW)?: NO

## 2025-01-05 SDOH — SOCIAL STABILITY: SOCIAL INSECURITY: HAVE YOU HAD ANY THOUGHTS OF HARMING ANYONE ELSE?: NO

## 2025-01-05 SDOH — SOCIAL STABILITY: SOCIAL INSECURITY: HAVE YOU HAD THOUGHTS OF HARMING ANYONE ELSE?: NO

## 2025-01-05 SDOH — SOCIAL STABILITY: SOCIAL INSECURITY: WITHIN THE LAST YEAR, HAVE YOU BEEN HUMILIATED OR EMOTIONALLY ABUSED IN OTHER WAYS BY YOUR PARTNER OR EX-PARTNER?: NO

## 2025-01-05 SDOH — SOCIAL STABILITY: SOCIAL INSECURITY: ARE THERE ANY APPARENT SIGNS OF INJURIES/BEHAVIORS THAT COULD BE RELATED TO ABUSE/NEGLECT?: NO

## 2025-01-05 SDOH — SOCIAL STABILITY: SOCIAL INSECURITY: DOES ANYONE TRY TO KEEP YOU FROM HAVING/CONTACTING OTHER FRIENDS OR DOING THINGS OUTSIDE YOUR HOME?: NO

## 2025-01-05 SDOH — ECONOMIC STABILITY: FOOD INSECURITY: WITHIN THE PAST 12 MONTHS, THE FOOD YOU BOUGHT JUST DIDN'T LAST AND YOU DIDN'T HAVE MONEY TO GET MORE.: NEVER TRUE

## 2025-01-05 SDOH — ECONOMIC STABILITY: HOUSING INSECURITY: IN THE PAST 12 MONTHS, HOW MANY TIMES HAVE YOU MOVED WHERE YOU WERE LIVING?: 1

## 2025-01-05 SDOH — ECONOMIC STABILITY: FOOD INSECURITY: HOW HARD IS IT FOR YOU TO PAY FOR THE VERY BASICS LIKE FOOD, HOUSING, MEDICAL CARE, AND HEATING?: NOT HARD AT ALL

## 2025-01-05 SDOH — HEALTH STABILITY: MENTAL HEALTH: HOW OFTEN DO YOU HAVE A DRINK CONTAINING ALCOHOL?: MONTHLY OR LESS

## 2025-01-05 SDOH — SOCIAL STABILITY: SOCIAL INSECURITY: DO YOU FEEL ANYONE HAS EXPLOITED OR TAKEN ADVANTAGE OF YOU FINANCIALLY OR OF YOUR PERSONAL PROPERTY?: NO

## 2025-01-05 SDOH — SOCIAL STABILITY: SOCIAL INSECURITY: HAS ANYONE EVER THREATENED TO HURT YOUR FAMILY OR YOUR PETS?: NO

## 2025-01-05 SDOH — ECONOMIC STABILITY: TRANSPORTATION INSECURITY: IN THE PAST 12 MONTHS, HAS LACK OF TRANSPORTATION KEPT YOU FROM MEDICAL APPOINTMENTS OR FROM GETTING MEDICATIONS?: NO

## 2025-01-05 SDOH — SOCIAL STABILITY: SOCIAL INSECURITY: ARE YOU OR HAVE YOU BEEN THREATENED OR ABUSED PHYSICALLY, EMOTIONALLY, OR SEXUALLY BY ANYONE?: NO

## 2025-01-05 ASSESSMENT — ACTIVITIES OF DAILY LIVING (ADL)
GROOMING: INDEPENDENT
ADEQUATE_TO_COMPLETE_ADL: YES
LACK_OF_TRANSPORTATION: NO
BATHING: INDEPENDENT
PATIENT'S MEMORY ADEQUATE TO SAFELY COMPLETE DAILY ACTIVITIES?: YES
FEEDING YOURSELF: INDEPENDENT
DRESSING YOURSELF: INDEPENDENT
JUDGMENT_ADEQUATE_SAFELY_COMPLETE_DAILY_ACTIVITIES: YES
HEARING - LEFT EAR: HEARING AID
WALKS IN HOME: INDEPENDENT
HEARING - RIGHT EAR: HEARING AID
LACK_OF_TRANSPORTATION: NO
TOILETING: INDEPENDENT

## 2025-01-05 ASSESSMENT — PATIENT HEALTH QUESTIONNAIRE - PHQ9
SUM OF ALL RESPONSES TO PHQ9 QUESTIONS 1 & 2: 0
1. LITTLE INTEREST OR PLEASURE IN DOING THINGS: NOT AT ALL
2. FEELING DOWN, DEPRESSED OR HOPELESS: NOT AT ALL

## 2025-01-05 ASSESSMENT — PAIN SCALES - GENERAL
PAINLEVEL_OUTOF10: 0 - NO PAIN

## 2025-01-05 ASSESSMENT — COGNITIVE AND FUNCTIONAL STATUS - GENERAL
STANDING UP FROM CHAIR USING ARMS: A LITTLE
PERSONAL GROOMING: A LITTLE
DAILY ACTIVITIY SCORE: 21
PATIENT BASELINE BEDBOUND: NO
HELP NEEDED FOR BATHING: A LITTLE
CLIMB 3 TO 5 STEPS WITH RAILING: A LOT
MOVING TO AND FROM BED TO CHAIR: A LITTLE
WALKING IN HOSPITAL ROOM: A LITTLE
TOILETING: A LITTLE
MOBILITY SCORE: 19

## 2025-01-05 ASSESSMENT — PAIN - FUNCTIONAL ASSESSMENT
PAIN_FUNCTIONAL_ASSESSMENT: 0-10
PAIN_FUNCTIONAL_ASSESSMENT: 0-10

## 2025-01-05 ASSESSMENT — COLUMBIA-SUICIDE SEVERITY RATING SCALE - C-SSRS
1. IN THE PAST MONTH, HAVE YOU WISHED YOU WERE DEAD OR WISHED YOU COULD GO TO SLEEP AND NOT WAKE UP?: NO
6. HAVE YOU EVER DONE ANYTHING, STARTED TO DO ANYTHING, OR PREPARED TO DO ANYTHING TO END YOUR LIFE?: NO
2. HAVE YOU ACTUALLY HAD ANY THOUGHTS OF KILLING YOURSELF?: NO

## 2025-01-05 ASSESSMENT — LIFESTYLE VARIABLES
AUDIT-C TOTAL SCORE: 1
HOW MANY STANDARD DRINKS CONTAINING ALCOHOL DO YOU HAVE ON A TYPICAL DAY: 1 OR 2
HOW OFTEN DO YOU HAVE 6 OR MORE DRINKS ON ONE OCCASION: NEVER
AUDIT-C TOTAL SCORE: 1
SKIP TO QUESTIONS 9-10: 1
HOW OFTEN DO YOU HAVE A DRINK CONTAINING ALCOHOL: MONTHLY OR LESS
SKIP TO QUESTIONS 9-10: 1
AUDIT-C TOTAL SCORE: 1

## 2025-01-05 NOTE — PROGRESS NOTES
Pharmacy Medication History     Source of Information: patient    Additional concerns with the patient's PTA list.   N/a  The following updates were made to the Prior to Admission medication list:     Medications ADDED:   N/a  Medications CHANGED:  N/a  Medications REMOVED:   N/a  Medications NOT TAKING:   N/a    Allergy reviewed : Yes    Meds 2 Beds : No    Outpatient pharmacy confirmed and updated in chart : Yes    [facility]    The list below reflectives the updated PTA list. Please review each medication in order reconciliation for additional clarification and justification.    Prior to Admission Medications   Prescriptions Last Dose Informant   aspirin 81 mg EC tablet 1/5/2025 Self   Sig: Take 1 tablet (81 mg) by mouth once daily.   clopidogrel (Plavix) 75 mg tablet 1/5/2025 Self   Sig: Take 1 tablet (75 mg) by mouth once daily.   lisinopril 10 mg tablet 1/5/2025 Self   Sig: Take 1 tablet (10 mg) by mouth once daily. Take first dose 36 hours after your last dose of entresto   multivitamin (MULTIPLE VITAMINS ORAL) Unknown Self   Sig: Take 1 tablet by mouth once daily.   rosuvastatin (Crestor) 20 mg tablet 1/5/2025 Self   Sig: Take 1 tablet (20 mg) by mouth once daily.   spironolactone (Aldactone) 25 mg tablet 1/5/2025 Self   Sig: Take 0.5 tablets (12.5 mg) by mouth once daily.   tiotropium (Spiriva Respimat) 2.5 mcg/actuation inhaler Unknown Self   Sig: Inhale 2 puffs once daily.   torsemide (Demadex) 100 mg tablet 1/5/2025 Self   Sig: Take 0.5 tablets (50 mg) by mouth once daily.      Facility-Administered Medications: None       The list below reflectives the updated allergy list. Please review each documented allergy for additional clarification and justification.    Allergies   Allergen Reactions    Penicillins Anaphylaxis and Hives    Sulfa (Sulfonamide Antibiotics) Hives          01/05/25 at 2:51 PM - Getachew German

## 2025-01-05 NOTE — Clinical Note
Sheath was exchanged with ACCESS KIT, S-ADWOA MINI, 4FR 10CM 0.018IN 40CM, NT/PT, ECHO ENHANCE NEEDLE.

## 2025-01-05 NOTE — ED PROCEDURE NOTE
Procedure  Critical Care    Performed by: Steffen Simerlink, MD  Authorized by: Steffen Simerlink, MD    Critical care provider statement:     Critical care time (minutes):  35    Critical care time was exclusive of:  Separately billable procedures and treating other patients and teaching time    Critical care was necessary to treat or prevent imminent or life-threatening deterioration of the following conditions:  Cardiac failure    Critical care was time spent personally by me on the following activities:  Development of treatment plan with patient or surrogate, discussions with consultants, evaluation of patient's response to treatment, examination of patient, obtaining history from patient or surrogate, ordering and performing treatments and interventions, ordering and review of laboratory studies, ordering and review of radiographic studies, re-evaluation of patient's condition and review of old charts    Care discussed with: accepting provider at another facility      Steffen Simerlink, MD Steffen Simerlink, MD  01/05/25 1106

## 2025-01-05 NOTE — ED PROVIDER NOTES
History of Present Illness     History provided by: Patient and EMS  Limitations to History: None   External Records Reviewed with Brief Summary: I reviewed his cardiology consult note from 9/30/2024 where he was evaluated for dizziness in addition to bradycardia.    HPI:  Wojciech Kern is a 74 y.o. male with a past medical history of congestive heart failure with AICD placement, sinus bradycardia, hypertension, CAD/inferior MI status post CABG x 1, BPH, chronic renal impairment stage IIIb, presenting to the emergency department via EMS with concern for feeling 2 shocks by his AICD.  He states he was out shoveling snow earlier this morning after he came back in he felt to shocks and started to feel chest pain and shortness of breath as well.  Denies any symptoms while he was shoveling snow his symptoms came on when he returned inside his home and was resting.  Denies any symptoms or pain currently.  Patient took his own Nitroglycerin prior to EMS arrival without improvement in symptoms. Via EMS he was in V tach received amiodarone and route and was able to break out of the rhythm.  No additional medications or interventions were performed by EMS.  States prior to this episode when EMS was called by his wife he felt fine and has had no sick like symptoms prior to this or throughout the week.  States he has otherwise been feeling well.  States the symptoms do not feel similar to the symptoms he experienced when he had his heart attack.  He said during that time he had significant abdominal pain and this feels different.  He does not feel like he is completely back to normal or asymptomatic at this time however does not have any chest pain, shortness of breath or concerning symptoms that initially caused his wife to call 911.  No other associate signs or symptoms report this time.    Physical Exam   Triage vitals:  T    HR 79  BP 90/56  RR 16  O2 95 % None (Room air)    General: Awake, alert, in no acute  distress  Eyes: Gaze conjugate.  No scleral icterus or injection  HENT: Normo-cephalic, atraumatic. No stridor  CV: Regular rate, regular rhythm. Radial pulses 2+ bilaterally  Resp: Breathing non-labored, speaking in full sentences.  Clear to auscultation bilaterally  GI: Soft, non-distended, non-tender. No rebound or guarding.  MSK/Extremities: No gross bony deformities. Moving all extremities  Skin: Warm. Appropriate color  Neuro: Alert. Oriented. Face symmetric. Speech is fluent.  Gross strength and sensation intact in b/l UE and LEs  Psych: Appropriate mood and affect    Medical Decision Making & ED Course   Medical Decision Makin y.o. male with the above past medical history presenting to the emergency department today via EMS after experiencing 2 shocks from his defibrillator.  Upon EMS arrival it was found that the patient was in V. tach, was started on amiodarone which broke his rhythm.  He only received 150 mg of amiodarone bolus.  No drip was started.  Upon arrival to the emergency department the patient was stable, not experiencing any symptoms, no longer in V. tach.  The patient was continuously monitored and was placed on telemetry.  Labs were reviewed, patient's electrolytes were not deranged with the exception of hypermagnesemia.  CBC otherwise reassuring, appears improved from 3 months ago.  On telemetry the patient had episodes of sinus bradycardia with sinus pauses.  Initially for this we spoke to cardiology who recommended EP evaluation.  The patient's initial troponin was 89, continued to rise into the thousands without any stabilization.  EKGs were reviewed, do not appear consistent with STEMI.  EP recommended the patient be started on a lidocaine drip and for it to be discontinued if the patient were to become significantly bradycardic.  Initially the patient was accepted to medicine here however due to the start of lidocaine drip in addition to the patient's bradycardia we [facility].  I spoke to the the CICU provider in regards to the patient's continuously rising troponin, he also reviewed the patient's EKGs and agreed that this does not look like a STEMI however recommended that the patient be started on heparin with a bolus.  Heparin drip was started, patient was given aspirin on initial arrival.  [place] [facility]  Patient's remains asymptomatic.  Patient remained stable.  Per CICU attending the patient will likely go to the Cath Lab due to the continuously uptrending troponins.  Patient was admitted to the CICU pending [place] [facility]  ----    Differential diagnoses considered include but are not limited to: Acute coronary syndrome, electrical storm, AICD malfunction, asymptomatic bradycardia, NSTEMI     Social Determinants of Health which Significantly Impact Care: None identified     EKG Independent Interpretation: See ED Course    Independent Result Review and Interpretation: See ED course    Chronic conditions affecting the patient's care: See HPI    The patient was discussed with the following consultants/services:  Spoke to the CICU, the hospitalist who accepted the patient initially, electrophysiology, and cardiology at [facility].    Care Considerations: See [facility]    ED Course:  ED Course as of 01/06/25 1942   [place] Jan 05, 2025   1237 ECG 12 lead  I have personally reviewed and interpreted this EKG.  Normal sinus rhythm, rate 77 BPM  Normal axis  KY interval and QRS duration within normal limits.  QTc within normal limits.  Subtle ST elevation III and slight in aVF but does not meet STEMI criteria  On prior EKGs he has had inferior ST segment changes [SS]   1300 ECG 12 lead  I have personally reviewed and interpreted this EKG.  Normal sinus rhythm, rate 71 BPM  LAD  KY interval prolonged  QRS duration within normal limits.  QTc within normal limits.  Inferior ST changes seen on initial EKG today have resolved  TWI  inferior and lateral leads seen previously   [SS]   1302 External chart review:  Echo Oct 2024  CONCLUSIONS:   1. Left ventricular ejection fraction is severely decreased, calculated by Mckeon's biplane at 26%.   2. There is global hypokinesis of the left ventricle with minor regional variations.   3. Spectral Doppler shows an abnormal pattern of left ventricular diastolic filling.   4. Left ventricular cavity size is moderate to severely dilated.   5. 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   6. No left ventricular thrombus visualized.   7. There is severely increased eccentric left ventricular hypertrophy.   8. There is normal right ventricular global systolic function.   9. The left atrium is severely dilated.  10. Mild to moderate mitral valve regurgitation.  11. Moderately decreased mitral valve posterior leaflet mobility.  12. Right ventricular systolic pressure is within normal limits.       [SS]   1315 Resident interpretation of the EKG performed at 1257 shows sinus rhythm with first-degree AV block rate of 71 no ST elevation MI.  Does have some nonspecific ST elevations in lead III without reciprocal changes. [KD]   1354 XR chest 1 view  IMPRESSION:  Probable left basilar atelectasis [KD]   1430 Paged electrophysiology, waiting to hear back from them, he is having asymptomatic bradycardia where his heart rate drops into the 30s however no change in mentation is awake alert oriented and speaking when this happens.  Did speak to cardiology and they are aware of this.  Recommended consulting electrophysiology thus the page was placed out however I was unable to speak to them.  Patient has been admitted. [KD]   1628 Personally discussed currently available facts and concerns about the case with Dr Gilombardo at [facility], who advises accepts to CICU   [SS]   9072 Went to reevalate patient he is asymptomatic. No just chest pain or shortness of breath. Heparin  ordered, repeat EKG, and will continue to monitor. [KD]   1720 ECG 12 lead  I have personally reviewed and interpreted this EKG.  Normal sinus rhythm, rate 60 BPM  LAD  VT interval and QRS duration within normal limits.  QTc within normal limits.  TWI inferior and lateral leads  When compared to prior unchanged   [SS]      ED Course User Index  [KD] Jess Gonzalez DO  [SS] Steffen Simerlink, MD         Diagnoses as of 01/06/25 1942   ICD (implantable cardioverter-defibrillator) discharge   Ventricular tachycardia (Multi)   Bradycardia     Disposition   As a result of their workup, the patient will require transfer to another facility.  The patient and/or his guardian/representative is agreeable to transfer at this time.   We will continue to monitor and manage the patient in the Emergency Department until transport for transfer can be arranged.    Seen and discussed with ED Attending  Jess Gonzalez DO, PGY-2  Emergency Medicine     Jess Gonzalez DO  Resident  01/06/25 1942    Emergency Medicine Attending Attestation:     The patient was seen by the resident/fellow.  I have personally performed a substantive portion of the encounter.  I have seen and examined the patient; agree with the workup, evaluation, MDM, management and diagnosis.  The care plan has been discussed with the resident; I have reviewed the resident’s note and agree with the documented findings.      Patient presented via EMS after defibrillator discharge x2 at home after pt was shoveling snow. He denies chest pain while shoveling snow, but reports when he came back inside to rest he began to feel short of breath and then defibrillator fired x2 so wife called EMS. With EMS, pt was noted to be in V tach. See media tab for pre-hospital EKG. They administered an amio bolus and he converted to NSR. On arrival to the ED, he is not having any chest pain or shortness of breath. He is in no acute distress. Resting comfortably on hospital  bed. Initial EKG showed some subtle changes in the inferior leads that did not meet STEMI criteria. EKG was repeated 15 minutes from initial and these changes had resolved. He was given a dose of aspirin. On telemetry monitor he was noted to have episodes of sinus bradycardia and sinus pauses. Discussed with EP who recommended lidocaine infusion as the bradycardia may have been 2/2 amiodarone. The only unit at [facility] that can admit pt with lidocaine infusion is the ICU and unfortunately they do not have any beds therefore discussed case with CICU attending at [facility] who accepts the patient as a transfer. His initial troponin was only mildly elevated in setting of v tach, ICD discharge, and CKD. However it began to uptrend. Patient was reassessed, continues to deny any chest pain, SOB, nausea. EKG repeated and is unchanged from his prior EKGs. Regardless, with trop uptrending will start heparin as a precaution while pt awaits transfer to [facility] CICU for further workup and management. Patient signed out to Dr Licea pending transfer to [facility] CICU.     ED Course as of 01/09/25 1050   Sun Jan 05, 2025   1237 ECG 12 lead  I have personally reviewed and interpreted this EKG.  Normal sinus rhythm, rate 77 BPM  Normal axis  UT interval and QRS duration within normal limits.  QTc within normal limits.  Subtle ST elevation III and slight in aVF but does not meet STEMI criteria  On prior EKGs he has had inferior ST segment changes [SS]   1300 ECG 12 lead  I have personally reviewed and interpreted this EKG.  Normal sinus rhythm, rate 71 BPM  LAD  UT interval prolonged  QRS duration within normal limits.  QTc within normal limits.  Inferior ST changes seen on initial EKG today have resolved  TWI inferior and lateral leads seen previously   [SS]   1302 External chart review:  Echo Oct 2024  CONCLUSIONS:   1. Left ventricular ejection fraction is severely decreased, calculated by Mckeon's biplane at 26%.   2. There is global  hypokinesis of the left ventricle with minor regional variations.   3. Spectral Doppler shows an abnormal pattern of left ventricular diastolic filling.   4. Left ventricular cavity size is moderate to severely dilated.   5. 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   6. No left ventricular thrombus visualized.   7. There is severely increased eccentric left ventricular hypertrophy.   8. There is normal right ventricular global systolic function.   9. The left atrium is severely dilated.  10. Mild to moderate mitral valve regurgitation.  11. Moderately decreased mitral valve posterior leaflet mobility.  12. Right ventricular systolic pressure is within normal limits.       [SS]   1315 Resident interpretation of the EKG performed at 1257 shows sinus rhythm with first-degree AV block rate of 71 no ST elevation MI.  Does have some nonspecific ST elevations in lead III without reciprocal changes. [KD]   1354 XR chest 1 view  IMPRESSION:  Probable left basilar atelectasis [KD]   1430 Paged electrophysiology, waiting to hear back from them, he is having asymptomatic bradycardia where his heart rate drops into the 30s however no change in mentation is awake alert oriented and speaking when this happens.  Did speak to cardiology and they are aware of this.  Recommended consulting electrophysiology thus the page was placed out however I was unable to speak to them.  Patient has been admitted. [KD]   1628 Personally discussed currently available facts and concerns about the case with [place] [facility]ardo [facility]   [SS]   1703 Went to reevalate patient he is asymptomatic. No just chest pain or shortness of breath. Heparin ordered, repeat EKG, and will continue to monitor. [KD]   1720 ECG 12 lead  I have personally reviewed and interpreted this EKG.  Normal sinus rhythm, rate 60 BPM  LAD  IN interval and QRS duration within normal limits.  QTc within normal  limits.  TWI inferior and lateral leads  When compared to prior unchanged   [SS]      ED Course User Index  [KD] Jess Gonzalez DO  [SS] Steffen Simerlink, MD         Diagnoses as of 01/09/25 1050   ICD (implantable cardioverter-defibrillator) discharge   Ventricular tachycardia (Multi)   Bradycardia       Steffen Simerlink, MD Steffen Simerlink, MD  01/09/25 1058

## 2025-01-05 NOTE — Clinical Note
Single view of the saphenous vein graft to the left anterior descending obtained using power injection.

## 2025-01-05 NOTE — Clinical Note
Angioplasty of the proximal circumflex lesion. Inflation 1: Pressure = 12 duran; Duration = 10 sec. Inflation 2: Pressure = 12 duran; Duration = 10 sec.

## 2025-01-05 NOTE — Clinical Note
Angioplasty of the circumflex lesion. Inflation 1: Pressure = 12 duran; Duration = 10 sec. Inflation 2: Pressure = 12 duran; Duration = 10 sec.

## 2025-01-05 NOTE — ED TRIAGE NOTES
Pt to ED from home for v-tach. Per Ems pt was shoveling snow and was shocked x2 by his internal defibrillator. Pt endorses taking 1 nitroglycerin for CP. EMS reports pt was in v-tach on arrival, 150mg of Amio given. Pt converted to NSR en route without intervention. Pt denies CP at this time. Pt is alert and oriented airway intact.

## 2025-01-06 ENCOUNTER — APPOINTMENT (OUTPATIENT)
Dept: CARDIOLOGY | Facility: HOSPITAL | Age: 75
End: 2025-01-06
Payer: MEDICARE

## 2025-01-06 PROBLEM — I21.4 NSTEMI (NON-ST ELEVATED MYOCARDIAL INFARCTION) (MULTI): Status: ACTIVE | Noted: 2025-01-06

## 2025-01-06 LAB
ABO GROUP (TYPE) IN BLOOD: NORMAL
ALBUMIN SERPL BCP-MCNC: 4.1 G/DL (ref 3.4–5)
ALBUMIN SERPL BCP-MCNC: 4.2 G/DL (ref 3.4–5)
ALBUMIN SERPL BCP-MCNC: 4.2 G/DL (ref 3.4–5)
ALP SERPL-CCNC: 53 U/L (ref 33–136)
ALT SERPL W P-5'-P-CCNC: 18 U/L (ref 10–52)
ANION GAP SERPL CALC-SCNC: 11 MMOL/L (ref 10–20)
ANION GAP SERPL CALC-SCNC: 13 MMOL/L (ref 10–20)
ANION GAP SERPL CALC-SCNC: 14 MMOL/L (ref 10–20)
ANTIBODY SCREEN: NORMAL
AORTIC VALVE MEAN GRADIENT: 3 MMHG
AORTIC VALVE PEAK VELOCITY: 1.26 M/S
APPEARANCE UR: CLEAR
AST SERPL W P-5'-P-CCNC: 39 U/L (ref 9–39)
ATRIAL RATE: 58 BPM
ATRIAL RATE: 60 BPM
ATRIAL RATE: 67 BPM
ATRIAL RATE: 71 BPM
ATRIAL RATE: 77 BPM
AV PEAK GRADIENT: 6 MMHG
AVA (PEAK VEL): 2.54 CM2
AVA (VTI): 2.08 CM2
BILIRUB SERPL-MCNC: 0.6 MG/DL (ref 0–1.2)
BILIRUB UR STRIP.AUTO-MCNC: NEGATIVE MG/DL
BNP SERPL-MCNC: 719 PG/ML (ref 0–99)
BUN SERPL-MCNC: 75 MG/DL (ref 6–23)
BUN SERPL-MCNC: 78 MG/DL (ref 6–23)
BUN SERPL-MCNC: 78 MG/DL (ref 6–23)
CALCIUM SERPL-MCNC: 9.1 MG/DL (ref 8.6–10.6)
CALCIUM SERPL-MCNC: 9.4 MG/DL (ref 8.6–10.6)
CALCIUM SERPL-MCNC: 9.4 MG/DL (ref 8.6–10.6)
CARDIAC TROPONIN I PNL SERPL HS: 8369 NG/L (ref 0–53)
CARDIAC TROPONIN I PNL SERPL HS: 9896 NG/L (ref 0–53)
CHLORIDE SERPL-SCNC: 100 MMOL/L (ref 98–107)
CHLORIDE SERPL-SCNC: 100 MMOL/L (ref 98–107)
CHLORIDE SERPL-SCNC: 102 MMOL/L (ref 98–107)
CHLORIDE UR-SCNC: 70 MMOL/L
CHLORIDE/CREATININE (MMOL/G) IN URINE: 98 MMOL/G CREAT (ref 23–275)
CO2 SERPL-SCNC: 25 MMOL/L (ref 21–32)
CO2 SERPL-SCNC: 27 MMOL/L (ref 21–32)
CO2 SERPL-SCNC: 27 MMOL/L (ref 21–32)
COLOR UR: NORMAL
CREAT SERPL-MCNC: 2.48 MG/DL (ref 0.5–1.3)
CREAT SERPL-MCNC: 2.57 MG/DL (ref 0.5–1.3)
CREAT SERPL-MCNC: 2.8 MG/DL (ref 0.5–1.3)
CREAT UR-MCNC: 71.6 MG/DL (ref 20–370)
EGFRCR SERPLBLD CKD-EPI 2021: 23 ML/MIN/1.73M*2
EGFRCR SERPLBLD CKD-EPI 2021: 25 ML/MIN/1.73M*2
EGFRCR SERPLBLD CKD-EPI 2021: 26 ML/MIN/1.73M*2
EJECTION FRACTION APICAL 4 CHAMBER: 35.3
EJECTION FRACTION: 28 %
ERYTHROCYTE [DISTWIDTH] IN BLOOD BY AUTOMATED COUNT: 13.2 % (ref 11.5–14.5)
FERRITIN SERPL-MCNC: 176 NG/ML (ref 20–300)
GLUCOSE SERPL-MCNC: 102 MG/DL (ref 74–99)
GLUCOSE SERPL-MCNC: 96 MG/DL (ref 74–99)
GLUCOSE SERPL-MCNC: 97 MG/DL (ref 74–99)
GLUCOSE UR STRIP.AUTO-MCNC: NORMAL MG/DL
HCT VFR BLD AUTO: 32 % (ref 41–52)
HGB BLD-MCNC: 10.9 G/DL (ref 13.5–17.5)
HOLD SPECIMEN: NORMAL
IRON SATN MFR SERPL: 33 % (ref 25–45)
IRON SERPL-MCNC: 92 UG/DL (ref 35–150)
KETONES UR STRIP.AUTO-MCNC: NEGATIVE MG/DL
LEFT ATRIUM VOLUME AREA LENGTH INDEX BSA: 76.5 ML/M2
LEFT VENTRICLE INTERNAL DIMENSION DIASTOLE: 5.47 CM (ref 3.5–6)
LEFT VENTRICULAR OUTFLOW TRACT DIAMETER: 2 CM
LEUKOCYTE ESTERASE UR QL STRIP.AUTO: NEGATIVE
LIDOCAIN SERPL-MCNC: 2 UG/ML (ref 1–5)
LIDOCAIN SERPL-MCNC: 2.2 UG/ML (ref 1–5)
MAGNESIUM SERPL-MCNC: 2.59 MG/DL (ref 1.6–2.4)
MAGNESIUM SERPL-MCNC: 2.59 MG/DL (ref 1.6–2.4)
MCH RBC QN AUTO: 31.9 PG (ref 26–34)
MCHC RBC AUTO-ENTMCNC: 34.1 G/DL (ref 32–36)
MCV RBC AUTO: 94 FL (ref 80–100)
MITRAL VALVE E/A RATIO: 0.55
NITRITE UR QL STRIP.AUTO: NEGATIVE
NRBC BLD-RTO: 0 /100 WBCS (ref 0–0)
P AXIS: 34 DEGREES
P AXIS: 40 DEGREES
P AXIS: 46 DEGREES
P AXIS: 46 DEGREES
P AXIS: 51 DEGREES
P OFFSET: 169 MS
P OFFSET: 174 MS
P OFFSET: 176 MS
P OFFSET: 178 MS
P OFFSET: 180 MS
P ONSET: 108 MS
P ONSET: 113 MS
P ONSET: 114 MS
P ONSET: 115 MS
P ONSET: 119 MS
PH UR STRIP.AUTO: 6 [PH]
PHOSPHATE SERPL-MCNC: 4.2 MG/DL (ref 2.5–4.9)
PHOSPHATE SERPL-MCNC: 4.6 MG/DL (ref 2.5–4.9)
PLATELET # BLD AUTO: 216 X10*3/UL (ref 150–450)
POTASSIUM SERPL-SCNC: 4.2 MMOL/L (ref 3.5–5.3)
POTASSIUM SERPL-SCNC: 4.3 MMOL/L (ref 3.5–5.3)
POTASSIUM SERPL-SCNC: 4.4 MMOL/L (ref 3.5–5.3)
POTASSIUM UR-SCNC: 28 MMOL/L
POTASSIUM/CREAT UR-RTO: 39 MMOL/G CREAT
PR INTERVAL: 196 MS
PR INTERVAL: 200 MS
PR INTERVAL: 202 MS
PR INTERVAL: 204 MS
PR INTERVAL: 212 MS
PROT SERPL-MCNC: 7.9 G/DL (ref 6.4–8.2)
PROT UR STRIP.AUTO-MCNC: NEGATIVE MG/DL
Q ONSET: 213 MS
Q ONSET: 214 MS
Q ONSET: 214 MS
Q ONSET: 215 MS
Q ONSET: 220 MS
QRS COUNT: 10 BEATS
QRS COUNT: 11 BEATS
QRS COUNT: 11 BEATS
QRS COUNT: 13 BEATS
QRS COUNT: 9 BEATS
QRS DURATION: 114 MS
QRS DURATION: 116 MS
QRS DURATION: 118 MS
QRS DURATION: 118 MS
QRS DURATION: 120 MS
QT INTERVAL: 418 MS
QT INTERVAL: 432 MS
QT INTERVAL: 446 MS
QT INTERVAL: 460 MS
QT INTERVAL: 480 MS
QTC CALCULATION(BAZETT): 451 MS
QTC CALCULATION(BAZETT): 469 MS
QTC CALCULATION(BAZETT): 471 MS
QTC CALCULATION(BAZETT): 473 MS
QTC CALCULATION(BAZETT): 480 MS
QTC FREDERICIA: 454 MS
QTC FREDERICIA: 454 MS
QTC FREDERICIA: 457 MS
QTC FREDERICIA: 463 MS
QTC FREDERICIA: 480 MS
R AXIS: -12 DEGREES
R AXIS: -18 DEGREES
R AXIS: -25 DEGREES
R AXIS: -6 DEGREES
R AXIS: 10 DEGREES
RBC # BLD AUTO: 3.42 X10*6/UL (ref 4.5–5.9)
RBC # UR STRIP.AUTO: NEGATIVE /UL
RH FACTOR (ANTIGEN D): NORMAL
RIGHT VENTRICLE FREE WALL PEAK S': 5.33 CM/S
SODIUM SERPL-SCNC: 135 MMOL/L (ref 136–145)
SODIUM SERPL-SCNC: 136 MMOL/L (ref 136–145)
SODIUM SERPL-SCNC: 136 MMOL/L (ref 136–145)
SODIUM UR-SCNC: 65 MMOL/L
SODIUM/CREAT UR-RTO: 91 MMOL/G CREAT
SP GR UR STRIP.AUTO: 1.01
T AXIS: -65 DEGREES
T AXIS: 164 DEGREES
T AXIS: 181 DEGREES
T AXIS: 192 DEGREES
T AXIS: 253 DEGREES
T OFFSET: 429 MS
T OFFSET: 430 MS
T OFFSET: 436 MS
T OFFSET: 445 MS
T OFFSET: 454 MS
TIBC SERPL-MCNC: 276 UG/DL (ref 240–445)
TRANSFERRIN SERPL-MCNC: 215 MG/DL (ref 200–360)
TRICUSPID ANNULAR PLANE SYSTOLIC EXCURSION: 1.5 CM
UFH PPP CHRO-ACNC: 0.3 IU/ML
UIBC SERPL-MCNC: 184 UG/DL (ref 110–370)
UROBILINOGEN UR STRIP.AUTO-MCNC: NORMAL MG/DL
VENTRICULAR RATE: 58 BPM
VENTRICULAR RATE: 60 BPM
VENTRICULAR RATE: 67 BPM
VENTRICULAR RATE: 71 BPM
VENTRICULAR RATE: 77 BPM
WBC # BLD AUTO: 7.1 X10*3/UL (ref 4.4–11.3)

## 2025-01-06 PROCEDURE — 2500000001 HC RX 250 WO HCPCS SELF ADMINISTERED DRUGS (ALT 637 FOR MEDICARE OP)

## 2025-01-06 PROCEDURE — 4B02XTZ MEASUREMENT OF CARDIAC DEFIBRILLATOR, EXTERNAL APPROACH: ICD-10-PCS

## 2025-01-06 PROCEDURE — 80069 RENAL FUNCTION PANEL: CPT

## 2025-01-06 PROCEDURE — 85027 COMPLETE CBC AUTOMATED: CPT

## 2025-01-06 PROCEDURE — 2500000004 HC RX 250 GENERAL PHARMACY W/ HCPCS (ALT 636 FOR OP/ED)

## 2025-01-06 PROCEDURE — 2500000002 HC RX 250 W HCPCS SELF ADMINISTERED DRUGS (ALT 637 FOR MEDICARE OP, ALT 636 FOR OP/ED)

## 2025-01-06 PROCEDURE — 84484 ASSAY OF TROPONIN QUANT: CPT

## 2025-01-06 PROCEDURE — 36415 COLL VENOUS BLD VENIPUNCTURE: CPT

## 2025-01-06 PROCEDURE — 93261 INTERROGATE SUBQ DEFIB: CPT

## 2025-01-06 PROCEDURE — 83735 ASSAY OF MAGNESIUM: CPT

## 2025-01-06 PROCEDURE — 93005 ELECTROCARDIOGRAM TRACING: CPT

## 2025-01-06 PROCEDURE — 99291 CRITICAL CARE FIRST HOUR: CPT

## 2025-01-06 PROCEDURE — 82436 ASSAY OF URINE CHLORIDE: CPT

## 2025-01-06 PROCEDURE — 93010 ELECTROCARDIOGRAM REPORT: CPT | Performed by: INTERNAL MEDICINE

## 2025-01-06 PROCEDURE — C8929 TTE W OR WO FOL WCON,DOPPLER: HCPCS

## 2025-01-06 PROCEDURE — 97116 GAIT TRAINING THERAPY: CPT | Mod: GP

## 2025-01-06 PROCEDURE — 93306 TTE W/DOPPLER COMPLETE: CPT | Performed by: INTERNAL MEDICINE

## 2025-01-06 PROCEDURE — 97161 PT EVAL LOW COMPLEX 20 MIN: CPT | Mod: GP

## 2025-01-06 PROCEDURE — 80176 ASSAY OF LIDOCAINE: CPT

## 2025-01-06 PROCEDURE — 81003 URINALYSIS AUTO W/O SCOPE: CPT

## 2025-01-06 PROCEDURE — 94640 AIRWAY INHALATION TREATMENT: CPT

## 2025-01-06 PROCEDURE — 83880 ASSAY OF NATRIURETIC PEPTIDE: CPT

## 2025-01-06 PROCEDURE — 1100000001 HC PRIVATE ROOM DAILY

## 2025-01-06 PROCEDURE — 85520 HEPARIN ASSAY: CPT

## 2025-01-06 RX ADMIN — LIDOCAINE HYDROCHLORIDE 1 MG/MIN: 8 INJECTION, SOLUTION INTRAVENOUS at 00:01

## 2025-01-06 RX ADMIN — ROSUVASTATIN CALCIUM 20 MG: 20 TABLET, FILM COATED ORAL at 09:04

## 2025-01-06 RX ADMIN — LIDOCAINE HYDROCHLORIDE 1 MG/MIN: 8 INJECTION, SOLUTION INTRAVENOUS at 18:27

## 2025-01-06 RX ADMIN — PERFLUTREN 3 ML OF DILUTION: 6.52 INJECTION, SUSPENSION INTRAVENOUS at 16:27

## 2025-01-06 RX ADMIN — HEPARIN SODIUM 800 UNITS/HR: 10000 INJECTION, SOLUTION INTRAVENOUS at 18:27

## 2025-01-06 RX ADMIN — SODIUM CHLORIDE, POTASSIUM CHLORIDE, SODIUM LACTATE AND CALCIUM CHLORIDE 500 ML: 600; 310; 30; 20 INJECTION, SOLUTION INTRAVENOUS at 13:02

## 2025-01-06 RX ADMIN — TIOTROPIUM BROMIDE INHALATION SPRAY 2 PUFF: 3.12 SPRAY, METERED RESPIRATORY (INHALATION) at 08:40

## 2025-01-06 RX ADMIN — ASPIRIN 81 MG: 81 TABLET, COATED ORAL at 09:04

## 2025-01-06 SDOH — ECONOMIC STABILITY: HOUSING INSECURITY: IN THE PAST 12 MONTHS, HOW MANY TIMES HAVE YOU MOVED WHERE YOU WERE LIVING?: 0

## 2025-01-06 SDOH — ECONOMIC STABILITY: FOOD INSECURITY: WITHIN THE PAST 12 MONTHS, YOU WORRIED THAT YOUR FOOD WOULD RUN OUT BEFORE YOU GOT THE MONEY TO BUY MORE.: NEVER TRUE

## 2025-01-06 SDOH — SOCIAL STABILITY: SOCIAL INSECURITY: WITHIN THE LAST YEAR, HAVE YOU BEEN AFRAID OF YOUR PARTNER OR EX-PARTNER?: NO

## 2025-01-06 SDOH — ECONOMIC STABILITY: HOUSING INSECURITY: IN THE LAST 12 MONTHS, WAS THERE A TIME WHEN YOU WERE NOT ABLE TO PAY THE MORTGAGE OR RENT ON TIME?: NO

## 2025-01-06 SDOH — SOCIAL STABILITY: SOCIAL INSECURITY: WITHIN THE LAST YEAR, HAVE YOU BEEN HUMILIATED OR EMOTIONALLY ABUSED IN OTHER WAYS BY YOUR PARTNER OR EX-PARTNER?: NO

## 2025-01-06 SDOH — SOCIAL STABILITY: SOCIAL INSECURITY: ARE YOU MARRIED, WIDOWED, DIVORCED, SEPARATED, NEVER MARRIED, OR LIVING WITH A PARTNER?: MARRIED

## 2025-01-06 SDOH — ECONOMIC STABILITY: INCOME INSECURITY: IN THE PAST 12 MONTHS HAS THE ELECTRIC, GAS, OIL, OR WATER COMPANY THREATENED TO SHUT OFF SERVICES IN YOUR HOME?: NO

## 2025-01-06 SDOH — ECONOMIC STABILITY: FOOD INSECURITY: WITHIN THE PAST 12 MONTHS, THE FOOD YOU BOUGHT JUST DIDN'T LAST AND YOU DIDN'T HAVE MONEY TO GET MORE.: NEVER TRUE

## 2025-01-06 SDOH — ECONOMIC STABILITY: FOOD INSECURITY: HOW HARD IS IT FOR YOU TO PAY FOR THE VERY BASICS LIKE FOOD, HOUSING, MEDICAL CARE, AND HEATING?: NOT VERY HARD

## 2025-01-06 SDOH — ECONOMIC STABILITY: HOUSING INSECURITY: AT ANY TIME IN THE PAST 12 MONTHS, WERE YOU HOMELESS OR LIVING IN A SHELTER (INCLUDING NOW)?: NO

## 2025-01-06 SDOH — ECONOMIC STABILITY: TRANSPORTATION INSECURITY: IN THE PAST 12 MONTHS, HAS LACK OF TRANSPORTATION KEPT YOU FROM MEDICAL APPOINTMENTS OR FROM GETTING MEDICATIONS?: NO

## 2025-01-06 ASSESSMENT — PAIN SCALES - GENERAL
PAINLEVEL_OUTOF10: 0 - NO PAIN

## 2025-01-06 ASSESSMENT — COGNITIVE AND FUNCTIONAL STATUS - GENERAL
WALKING IN HOSPITAL ROOM: A LOT
MOBILITY SCORE: 16
MOVING TO AND FROM BED TO CHAIR: A LITTLE
TURNING FROM BACK TO SIDE WHILE IN FLAT BAD: A LITTLE
CLIMB 3 TO 5 STEPS WITH RAILING: A LOT
MOVING FROM LYING ON BACK TO SITTING ON SIDE OF FLAT BED WITH BEDRAILS: A LITTLE
STANDING UP FROM CHAIR USING ARMS: A LITTLE

## 2025-01-06 ASSESSMENT — PAIN - FUNCTIONAL ASSESSMENT
PAIN_FUNCTIONAL_ASSESSMENT: 0-10

## 2025-01-06 ASSESSMENT — ACTIVITIES OF DAILY LIVING (ADL)
LACK_OF_TRANSPORTATION: NO
ADL_ASSISTANCE: INDEPENDENT

## 2025-01-06 NOTE — CONSULTS
"Reason for Consult: ICD Shock    Subjective   74 y.o. male w/ ICM/HFrEF (EF 26% 12/2024) s/p sqICD (2018), CAD s/p HELEN x4 to RCA c/b VSD s/p CABG x1 (SVG-LAD and VSD repair in 2015), HTN, COPD, CKD3b presenting as a transfer from [facility] ED to  CICU with ICD shocks. EP is consulted for the same.     Per chart review, pt was shoveling snow and began to develop CP and dyspnea. His CP is characterized as soreness with 3/10 intensity without radiation. This caused him to stop shoveling snow and rest, after which he felt 2 ICD shocks. He took SL NTG and called EMS, who noted he was in VT (strip below) and administered amiodarone en-route to the hospital.         In the [facility] ED, he was noted to have uptrending troponin (89<1035<1733<3507<4620<5744). He was treated with aspirin, heparin infusion, and lidocaine infusion and [place] [facility]. Amiodarone was stopped due to bradycardia.     Device interrogation:         Review of Systems  Pertinent items are noted in HPI.     Objective   Visit Vitals  /71   Pulse 83   Temp 36.4 °C (97.5 °F) (Temporal)   Resp 13   Ht 1.778 m (5' 10\")   Wt 66.4 kg (146 lb 6.2 oz)   SpO2 96%   BMI 21.00 kg/m²   Smoking Status Former   BSA 1.81 m²        Physical Exam  General: awake, alert, no acute distress  HEENT: no scleral icterus, no JVD  CV: RRR, no MRG  Resp: CTA b/l, no wheezes, rales, or rhonchi  Abd: soft, NT/ND  LE: no edema, no cyanosis  Neuo: grossly normal  Psych: pleasant      Results for orders placed during the hospital encounter of 12/10/24    Transthoracic echo (TTE) complete    Narrative  [facility], [address] [place] [address]  Tel [phone] and Fax [phone]    TRANSTHORACIC ECHOCARDIOGRAM REPORT      Patient Name:       DANICA Mccall Physician:    [id number] Zackery Galdamez DO  Study Date:         12/10/2024          Ordering Provider:    [id number] JAMIE BAGLEY  MRN/PID:            [id number]            Fellow:  Accession#:         " [id number]        Nurse:  Date of Birth/Age:  1950 / 74 years Sonographer:          Aryan Smiley RDCS  Gender assigned at  M                   Additional Staff:  Birth:  Height:             175.00 cm           Admit Date:           12/10/2024  Weight:             70.00 kg            Admission Status:     Outpatient  BSA / BMI:          1.85 m2 / 22.86     Encounter#:           [id number]  kg/m2  Blood Pressure:     130/80 mmHg         Department Location:  [facility] Non  Invasive    Study Type:    TRANSTHORACIC ECHO (TTE) COMPLETE  Diagnosis/ICD: Atherosclerotic heart disease of native coronary artery without  angina pectoris-I25.10  Indication:    CAD  CPT Code:      Echo Complete w Full Doppler-65098    Patient History:  Valve Disorders:   Mitral Regurgitation.  Pacer/Defib:       AICD  Pertinent History: CAD. NSTEMI, HFrEF, LV aneurysm.    Study Detail: The following Echo studies were performed: 2D, M-Mode, Doppler and  color flow. Technically challenging study due to body habitus and  poor acoustic windows. Definity used as a contrast agent for  endocardial border definition. Total contrast used for this  procedure was 3 mL via IV push.      PHYSICIAN INTERPRETATION:  Left Ventricle: Left ventricular ejection fraction is severely decreased, calculated by Mckeon's biplane at 26%. There is severely increased eccentric left ventricular hypertrophy. There is global hypokinesis of the left ventricle with minor regional variations. The left ventricular cavity size is moderate to severely dilated. There is normal septal and normal posterior left ventricular wall thickness. Spectral Doppler shows an abnormal pattern of left ventricular diastolic filling. There is no definite left ventricular thrombus visualized. 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  Left Atrium: The left atrium is severely dilated.  Right Ventricle: The right ventricle is normal in  size. There is normal right ventricular global systolic function.  Right Atrium: The right atrium is normal in size.  Aortic Valve: The aortic valve is trileaflet. The aortic valve dimensionless index is 0.65. There is no evidence of aortic valve regurgitation. The peak instantaneous gradient of the aortic valve is 5 mmHg. The mean gradient of the aortic valve is 3 mmHg.  Mitral Valve: The mitral valve is mild to moderately thickened. There is moderately decreased mitral valve posterior leaflet mobility. The peak instantaneous gradient of the mitral valve is 5 mmHg. There is mild to moderate mitral valve regurgitation which is eccentrically directed. The mitral regurgitant orifice area is 8 mm2. The mitral regurgitant volume is 14.09 ml.  Tricuspid Valve: The tricuspid valve is structurally normal. There is trace to mild tricuspid regurgitation. The Doppler estimated RVSP is within normal limits at 17.5 mmHg.  Pulmonic Valve: The pulmonic valve is structurally normal. There is physiologic pulmonic valve regurgitation.  Pericardium: There is no pericardial effusion noted.  Aorta: The aortic root is normal. There is mild dilatation of the ascending aorta. There is no dilatation of the aortic root.  In comparison to the previous echocardiogram(s): Compared with study dated 11/7/2023, no significant change.      CONCLUSIONS:  1. Left ventricular ejection fraction is severely decreased, calculated by Mckeon's biplane at 26%.  2. There is global hypokinesis of the left ventricle with minor regional variations.  3. Spectral Doppler shows an abnormal pattern of left ventricular diastolic filling.  4. Left ventricular cavity size is moderate to severely dilated.  5. 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  6. No left ventricular thrombus visualized.  7. There is severely increased eccentric left ventricular hypertrophy.  8. There is normal right ventricular global systolic  function.  9. The left atrium is severely dilated.  10. Mild to moderate mitral valve regurgitation.  11. Moderately decreased mitral valve posterior leaflet mobility.  12. Right ventricular systolic pressure is within normal limits.    QUANTITATIVE DATA SUMMARY:    2D MEASUREMENTS:           Normal Ranges:  IVSd:            1.02 cm   (0.6-1.1cm)  LVPWd:           0.64 cm   (0.6-1.1cm)  LVIDd:           10.18 cm  (3.9-5.9cm)  LVIDs:           8.31 cm  LV Mass Index:   385 g/m2  LVEDV Index:     122 ml/m2  LV % FS          18.3 %      LA VOLUME:                    Normal Ranges:  LA Vol A4C:        79.6 ml    (22+/-6mL/m2)  LA Vol A2C:        93.8 ml  LA Vol BP:         89.4 ml  LA Vol Index A4C:  43.1ml/m2  LA Vol Index A2C:  50.8 ml/m2  LA Vol Index BP:   48.3 ml/m2  LA Area A4C:       24.1 cm2  LA Area A2C:       25.3 cm2  LA Major Axis A4C: 6.2 cm  LA Major Axis A2C: 5.8 cm  LA Volume Index:   48.3 ml/m2  LA Vol A4C:        75.4 ml  LA Vol A2C:        87.0 ml  LA Vol Index BSA:  43.9 ml/m2      RA VOLUME BY A/L METHOD:          Normal Ranges:  RA Area A4C:             15.9 cm2      AORTA MEASUREMENTS:         Normal Ranges:  Ao Sinus, d:        3.10 cm (2.1-3.5cm)  Ao STJ, d:          3.30 cm (1.7-3.4cm)  Asc Ao, d:          3.60 cm (2.1-3.4cm)      LV SYSTOLIC FUNCTION BY 2D PLANIMETRY (MOD):  Normal Ranges:  EF-A4C View:    37 % (>=55%)  EF-A2C View:    24 %  EF-Biplane:     26 %  LV EF Reported: 26 %      LV DIASTOLIC FUNCTION:             Normal Ranges:  MV Peak E:             0.64 m/s    (0.7-1.2 m/s)  MV Peak A:             0.94 m/s    (0.42-0.7 m/s)  E/A Ratio:             0.68        (1.0-2.2)  MV e'                  0.055 m/s   (>8.0)  MV lateral e'          0.08 m/s  MV medial e'           0.03 m/s  E/e' Ratio:            11.59       (<8.0)  PulmV Sys Cricket:         44.36 cm/s  PulmV Kay Cricket:        14.21 cm/s  PulmV S/D Cricket:         3.12  PulmV A Revs Cricket:      25.02 cm/s  PulmV A Revs Dur:      106.11  msec      MITRAL VALVE:          Normal Ranges:  MV Vmax:      1.15 m/s (<=1.3m/s)  MV peak P.2 mmHg (<5mmHg)  MV mean P.8 mmHg (<2mmHg)  MV VTI:       30.25 cm (10-13cm)  MV DT:        275 msec (150-240msec)      MITRAL INSUFFICIENCY:             Normal Ranges:  MR VTI:               176.26 cm  MR Vmax:              484.55 cm/s  MR Volume:            14.09 ml  MR Flow Rt:           38.72 ml/s  MR EROA:              8 mm2      AORTIC VALVE:                     Normal Ranges:  AoV Vmax:                1.15 m/s (<=1.7m/s)  AoV Peak P.3 mmHg (<20mmHg)  AoV Mean PG:             3.5 mmHg (1.7-11.5mmHg)  LVOT Max Cricket:            0.78 m/s (<=1.1m/s)  AoV VTI:                 26.88 cm (18-25cm)  LVOT VTI:                17.44 cm  LVOT Diameter:           2.27 cm  (1.8-2.4cm)  AoV Area, VTI:           2.62 cm2 (2.5-5.5cm2)  AoV Area,Vmax:           2.74 cm2 (2.5-4.5cm2)  AoV Dimensionless Index: 0.65      RIGHT VENTRICLE:  RV Basal 3.40 cm  RV Mid   2.00 cm  RV Major 7.5 cm  TAPSE:   14.0 mm      TRICUSPID VALVE/RVSP:          Normal Ranges:  Peak TR Velocity:     1.90 m/s  Est. RA Pressure:     3 mmHg  RV Syst Pressure:     17 mmHg  (< 30mmHg)  IVC Diam:             1.60 cm      PULMONIC VALVE:          Normal Ranges:  PV Accel Time:  129 msec (>120ms)  PV Max Cricket:     1.0 m/s  (0.6-0.9m/s)  PV Max P.4 mmHg      Pulmonary Veins:  PulmV A Revs Dur: 106.11 msec  PulmV A Revs Cricket: 25.02 cm/s  PulmV Kay Cricket:   14.21 cm/s  PulmV S/D Cricket:    3.12  PulmV Sys Cricket:    44.36 cm/s      [id number] Zackery Galdamez DO  Electronically signed on 2024 at 7:15:10 PM        ** Final **       Lab Review   Lab Results   Component Value Date     2025    K 4.3 2025     2025    CO2 27 2025    BUN 75 (H) 2025    CREATININE 2.48 (H) 2025    GLUCOSE 102 (H) 2025    CALCIUM 9.4 2025     Lab Results   Component Value Date    WBC 7.1 2025    HGB 10.9  (L) 01/06/2025    HCT 32.0 (L) 01/06/2025    MCV 94 01/06/2025     01/06/2025       Troponin I, High Sensitivity   Date/Time Value Ref Range Status   01/05/2025 06:36 PM 5,744 (HH) 0 - 20 ng/L Final     Comment:     Previous result verified on 1/5/2025 1356 on specimen/case [id number] called with component TRPHS for procedure Troponin I, High Sensitivity, Initial with value 89 ng/L.   01/05/2025 05:12 PM 4,620 (HH) 0 - 20 ng/L Final     Comment:     Previous result verified on 1/5/2025 1356 on specimen/case [id number] called with component TRPHS for procedure Troponin I, High Sensitivity, Initial with value 89 ng/L.   01/05/2025 03:53 PM 3,507 (HH) 0 - 20 ng/L Final     Comment:     Previous result verified on 1/5/2025 1356 on specimen/case [id number] called with component TRPHS for procedure Troponin I, High Sensitivity, Initial with value 89 ng/L.     Troponin I, High Sensitivity (CMC)   Date/Time Value Ref Range Status   01/06/2025 02:44 AM 8,369 (HH) 0 - 53 ng/L Final     Comment:     Previous result verified on 1/6/2025 0002 on specimen/case [id number] called with component TRPHS for procedure Troponin I, High Sensitivity with value 9,896 ng/L.   01/05/2025 11:17 PM 9,896 (HH) 0 - 53 ng/L Final     BNP   Date/Time Value Ref Range Status   01/06/2025 05:08  (H) 0 - 99 pg/mL Final   10/08/2024 03:15  (H) 0 - 99 pg/mL Final   02/21/2024 05:59 AM 1,257 (H) 0 - 99 pg/mL Final     LDL   Date/Time Value Ref Range Status   06/21/2023 09:16 AM 52 0 - 99 mg/dL Final     Comment:     .                           NEAR      BORD      AGE      DESIRABLE  OPTIMAL    HIGH     HIGH     VERY HIGH     0-19 Y     0 - 109     ---    110-129   >/= 130     ----    20-24 Y     0 - 119     ---    120-159   >/= 160     ----      >24 Y     0 -  99   100-129  130-159   160-189     >/=190  .   06/17/2022 09:00 AM 46 0 - 99 mg/dL Final     Comment:     .                           NEAR      BORD      AGE       DESIRABLE  OPTIMAL    HIGH     HIGH     VERY HIGH     0-19 Y     0 - 109     ---    110-129   >/= 130     ----    20-24 Y     0 - 119     ---    120-159   >/= 160     ----      >24 Y     0 -  99   100-129  130-159   160-189     >/=190  .       Triglycerides   Date/Time Value Ref Range Status   07/11/2024 10:24 AM 79 0 - 149 mg/dL Final     Comment:        Age         Desirable   Borderline High   High     Very High   0 D-90 D    19 - 174         ----         ----        ----  91 D- 9 Y     0 -  74        75 -  99     >/= 100      ----    10-19 Y     0 -  89        90 - 129     >/= 130      ----    20-24 Y     0 - 114       115 - 149     >/= 150      ----         >24 Y     0 - 149       150 - 199    200- 499    >/= 500    Venipuncture immediately after or during the administration of Metamizole may lead to falsely low results. Testing should be performed immediately prior to Metamizole dosing.   06/21/2023 09:16 AM 43 0 - 149 mg/dL Final     Comment:     .      AGE      DESIRABLE   BORDERLINE HIGH   HIGH     VERY HIGH   0 D-90 D    19 - 174         ----         ----        ----  91 D- 9 Y     0 -  74        75 -  99     >/= 100      ----    10-19 Y     0 -  89        90 - 129     >/= 130      ----    20-24 Y     0 - 114       115 - 149     >/= 150      ----         >24 Y     0 - 149       150 - 199    200- 499    >/= 500  .   Venipuncture immediately after or during the    administration of Metamizole may lead to falsely   low results. Testing should be performed immediately   prior to Metamizole dosing.   06/17/2022 09:00 AM 52 0 - 149 mg/dL Final     Comment:     .      AGE      DESIRABLE   BORDERLINE HIGH   HIGH     VERY HIGH   0 D-90 D    19 - 174         ----         ----        ----  91 D- 9 Y     0 -  74        75 -  99     >/= 100      ----    10-19 Y     0 -  89        90 - 129     >/= 130      ----    20-24 Y     0 - 114       115 - 149     >/= 150      ----         >24 Y     0 - 149       150 - 199     200- 499    >/= 500  .   Venipuncture immediately after or during the    administration of Metamizole may lead to falsely   low results. Testing should be performed immediately   prior to Metamizole dosing.           Assessment/Plan   74 y.o. male w/ ICM/HFrEF (EF 26% 12/2024) s/p sqICD (2018), CAD s/p HELEN x4 to RCA c/b VSD s/p CABG x1 (SVG-LAD and VSD repair in 2015), HTN, COPD, CKD3b presenting as a transfer from [facility] ED to  CICU with ICD shocks. EP is consulted for the same. Device interrogation and EMS ECG demonstrate wide complex regular tachycardia that starts at a rate of approx 150bpm, but accelerates later with a change in morphology concerning for VF. Suspect this is in the setting of NSTEMI given concomitant chest pain and history of significant CAD.     Impression:  #VF/VT  #NSTEMI    Recommendations:  -Please maintain lidocaine and check level as needed  -Agree with coronary angiography as planned    Marlene Cota MD  PGY-5 Cardiovascular Medicine Fellow    Thank you for the opportunity to contribute to the care of this patient. Above recommendations discussed with Dr. Britton.     If further questions arise, please page the EP consult pager at [phone] on weekdays 7AM - 6PM and weekends 7AM - 2PM, or at [phone] at all other times. The EP device nurse can be reached at pager [phone] during regular business hours M-F.

## 2025-01-06 NOTE — PROGRESS NOTES
Pharmacy Medication History Review    Wojciech Kern is a 74 y.o. male admitted for VT (ventricular tachycardia) (Multi). Pharmacy reviewed the patient's [id number] medications and allergies for accuracy.    The list below reflects the updated PTA list.   Prior to Admission Medications   Prescriptions Last Dose Informant   aspirin 81 mg EC tablet  Self   Sig: Take 1 tablet (81 mg) by mouth once daily.   clopidogrel (Plavix) 75 mg tablet  Self   Sig: Take 1 tablet (75 mg) by mouth once daily.   lisinopril 10 mg tablet  Self   Sig: Take 1 tablet (10 mg) by mouth once daily. Take first dose 36 hours after your last dose of entresto   multivitamin (MULTIPLE VITAMINS ORAL)  Self   Sig: Take 1 tablet by mouth once daily.   rosuvastatin (Crestor) 20 mg tablet  Self   Sig: Take 1 tablet (20 mg) by mouth once daily.   spironolactone (Aldactone) 25 mg tablet  Self   Sig: Take 0.5 tablets (12.5 mg) by mouth once daily.   tiotropium (Spiriva Respimat) 2.5 mcg/actuation inhaler  Self   Sig: Inhale 2 puffs once daily.   torsemide (Demadex) 100 mg tablet  Self   Sig: Take 0.5 tablets (50 mg) by mouth once daily.      Facility-Administered Medications: None        The list below reflects the updated allergy list. Please review each documented allergy for additional clarification and justification.  Allergies  Reviewed by Hawk Lovett RN on 1/6/2025        Severity Reactions Comments    Penicillins High Anaphylaxis, Hives     Sulfa (sulfonamide Antibiotics) Not Specified Hives             Patient declines M2B at discharge. Pharmacy has been updated to [facility].    Sources used to complete the med history include:    [facility]  Pharmacy dispense history  Patient interview Good historian  Chart Review  Care Everywhere   ED Admission 1/5/25 [place]     Below are additional concerns with the patient's PTA list.  None     Medications ADDED:  None   Medications CHANGED:  None   Medications REMOVED:   None     Ike Sierra  Karen.   Transitions of Care Pharmacist  [facility]  Please reach out via Secure Chat for questions, or if no response call [facility] or [facility]

## 2025-01-06 NOTE — CARE PLAN
ADULT INPATIENT VIDEO-EEG EVENT MONITORING REPORT    Patient:   Bettina Miranda  MR#:    [id number]  Room #:    INPATIENT   YOB: 1985  Study Date:    June 24-26, 2019  Primary Physician:     Kaia Oliva MD   Referring Physician:   Laurent Sheets DO      CLINICAL INFORMATION:     This patient is a 37 y.o. female with a history of possible seizures. The specific reason we are doing this study is for event clarification and to evaluate for an underlying seizure focus. MEDICATIONS:     See MAR    RECORDING CONDITIONS:     Continuous video-EEG monitoring was performed with [employer] equiptment. The recorded period extended from June 24-26, 2019. Electrodes were applied according to the International 10-20 System. Data were obtained, stored, and interpreted according to ACNS guidelines (J Clin Neurophysiol 2006;23(2):) utilizing referential montage recording, with reformatting to longitudinal, transverse bipolar, and referential montages as necessary for interpretation, along with digital/automated EEG analysis. The patient tolerated the entire procedure well. DESCRIPTION OF BASELINE RECORD:    During the waking state, the predominant posterior resting frequency was rhythmic, symmetric, 9-10 Hz, 30-40 uV rhythm with reactivity to eye opening and closure demonstrated. Drowsiness was demonstrated by slow rolling eye movements followed by loss of background waking activities. Onset of Stage I sleep was demonstrated by gradual disappearance of background waking rhythms and the onset of gradual symmetric mixed-frequency 4-7 Hz slowing. Stage II sleep was characterized by vertex transients, sleep-spindles, and K-complexes, at times with shifting asymmetry demonstrated. Stage III and IV of sleep were characterized by progressive proportion of high voltage slowing in the delta frequency. No ECG abnormalities were noted. Muscle, motion, and eye movement artifacts were occasionally noted.      DESCRIPTION OF The patient's goals for the shift include  rest  Problem: Safety - Adult  Goal: Free from fall injury  Outcome: Progressing     Problem: Discharge Planning  Goal: Discharge to home or other facility with appropriate resources  Outcome: Progressing     Problem: Chronic Conditions and Co-morbidities  Goal: Patient's chronic conditions and co-morbidity symptoms are monitored and maintained or improved  Outcome: Progressing     Problem: Fall/Injury  Goal: Not fall by end of shift  Outcome: Progressing  Goal: Be free from injury by end of the shift  Outcome: Progressing  Goal: Verbalize understanding of personal risk factors for fall in the hospital  Outcome: Progressing  Goal: Verbalize understanding of risk factor reduction measures to prevent injury from fall in the home  Outcome: Progressing  Goal: Use assistive devices by end of the shift  Outcome: Progressing  Goal: Pace activities to prevent fatigue by end of the shift  Outcome: Progressing     Problem: Skin  Goal: Decreased wound size/increased tissue granulation at next dressing change  Outcome: Progressing  Flowsheets (Taken 1/5/2025 2212)  Decreased wound size/increased tissue granulation at next dressing change:   Promote sleep for wound healing   Utilize specialty bed per algorithm   Protective dressings over bony prominences  Goal: Participates in plan/prevention/treatment measures  Outcome: Progressing  Flowsheets (Taken 1/5/2025 2212)  Participates in plan/prevention/treatment measures:   Discuss with provider PT/OT consult   Elevate heels   Increase activity/out of bed for meals  Goal: Prevent/manage excess moisture  Outcome: Progressing  Flowsheets (Taken 1/5/2025 2212)  Prevent/manage excess moisture:   Cleanse incontinence/protect with barrier cream   Moisturize dry skin   Use wicking fabric (obtain order)   Follow provider orders for dressing changes   Monitor for/manage infection if present  Goal: Prevent/minimize sheer/friction injuries  Outcome:  EVENTS:    June 24, 2019  The Interical files were as described in the baseline recording. Digital spike and event analysis reviewed and showed no abnormalities. Two brief events were captured which consisted of an apparent loss of awareness followed by tremulousness. No overt epileptiform abnormalities were noted on the EEG. The events appeared nonepileptic. June 25, 2019  The Interical files were as described in the baseline recording. Digital spike and event analysis reviewed and showed no abnormalities. A more prolonged episode was noted. Which consisted clinically of an apparent loss of awareness followed by generalized shaking and stiffening. No overt epileptiform abnormalities were noted on the EEG. The event appeared nonepileptic. June 26, 2019  The Interical files were as described in the baseline recording. Digital spike and event analysis reviewed and showed no abnormalities. No events were recorded. E.E.G. INTERPRETATION:    1. Interictal Abnormalities: None. 2. Ictal Events: None. 3. Non-Epileptic Events: Multiple nonepileptic events were captured as described above. CLINICAL CORRELATION:    Multiple non-epileptic events were captured as described above.           Isela Michael DO  Board Certified Neurologist    Date Reported: 6/26/2019  Date Signed: 6/26/2019 Progressing  Flowsheets (Taken 1/5/2025 2212)  Prevent/minimize sheer/friction injuries:   Complete micro-shifts as needed if patient unable. Adjust patient position to relieve pressure points, not a full turn   Increase activity/out of bed for meals   Use pull sheet   HOB 30 degrees or less   Turn/reposition every 2 hours/use positioning/transfer devices   Utilize specialty bed per algorithm  Goal: Promote/optimize nutrition  Outcome: Progressing  Flowsheets (Taken 1/5/2025 2212)  Promote/optimize nutrition:   Assist with feeding   Monitor/record intake including meals   Consume > 50% meals/supplements  Goal: Promote skin healing  Outcome: Progressing  Flowsheets (Taken 1/5/2025 2212)  Promote skin healing:   Assess skin/pad under line(s)/device(s)   Ensure correct size (line/device) and apply per  instructions   Rotate device position/do not position patient on device   Protective dressings over bony prominences   Turn/reposition every 2 hours/use positioning/transfer devices       The clinical goals for the shift include  monitor for arrhythmias. Remain HDS.  Problem: Pain - Adult  Goal: Verbalizes/displays adequate comfort level or baseline comfort level  Outcome: Progressing

## 2025-01-06 NOTE — PROGRESS NOTES
Physical Therapy    Physical Therapy Evaluation & Treatment    Patient Name: Wojciech Kern  MRN: [id number]  Department: [facility]  Room: 08/08-A  Today's Date: 1/6/2025   Time Calculation  Start Time: 1032  Stop Time: 1110  Time Calculation (min): 38 min    Assessment/Plan   PT Assessment  PT Assessment Results: Decreased strength, Decreased endurance, Impaired balance, Decreased mobility, Decreased safety awareness  Rehab Prognosis: Good  Barriers to Discharge Home: Physical needs  Physical Needs: Stair navigation into home limited by function/safety, Ambulating household distances limited by function/safety, High falls risk due to function or environment  Evaluation/Treatment Tolerance: Patient tolerated treatment well  Medical Staff Made Aware: Yes  End of Session Communication: Bedside nurse  Assessment Comment: Pt performed bed mobility with SBA, functional transfers with CGA-Min A, and ambulated  ~80' with FWW with Min A for balance and FWW management. Pt will continue to benefit from skilled PT to improve functional mobility.  End of Session Patient Position: Up in chair, Alarm off, not on at start of session   IP OR SWING BED PT PLAN  Inpatient or Swing Bed: Inpatient  PT Plan  Treatment/Interventions: Bed mobility, Transfer training, Gait training, Stair training, Balance training, Strengthening, Endurance training, Range of motion, Therapeutic exercise, Therapeutic activity, Home exercise program, Postural re-education  PT Plan: Ongoing PT  PT Frequency: 4 times per week  PT Discharge Recommendations: Low intensity level of continued care  Equipment Recommended upon Discharge: Wheeled walker  PT Recommended Transfer Status: Assist x1, Assistive device  PT - OK to Discharge: Yes      Subjective     General Visit Information:  General  Reason for Referral: presenting as a transfer from [facility] ED to [facility] with ICD shocks  Past Medical History Relevant to Rehab: PMHx of ICM/HFrEF (EF 26% 12/2024) s/p ICD  (2018), CAD s/p HELEN x4 to RCA c/b VSD s/p CABG x1 (SVG-LAD and VSD repair in 2015), HTN, DLD, Emphysema, CKD3b  Prior to Session Communication: Bedside nurse  Patient Position Received: Bed, 3 rail up, Alarm off, not on at start of session  Preferred Learning Style: auditory, verbal, visual  General Comment: Pt awake and willing to participate in PT evaluation. (ext cath, tele, IV (1 lido, 800 heparin), BP cuff, tele)  Home Living:  Home Living  Type of Home: House  Lives With: Spouse  Home Adaptive Equipment: Cane  Home Layout: One level, Laundry in basement  Home Access: Stairs to enter with rails  Bathroom Shower/Tub: Tub/shower unit  Bathroom Equipment: None  Prior Level of Function:  Prior Function Per Pt/Caregiver Report  Level of [place]: Independent with ADLs and functional transfers  Receives Help From: Family  ADL Assistance: Independent  Homemaking Assistance: Independent  Ambulatory Assistance: Independent (Uses cane occasionally)  Vocational: Retired  Leisure: reading, walks 1 mile every day at [employer]  Prior Function Comments: Drives, hx of frequent falls- pt reported fall the day prior to admission; able to get up by himself  Precautions:  Precautions  Hearing/Visual Limitations: [place]; does not have his hearing aids with him  Medical Precautions: Cardiac precautions, Fall precautions    Vital Signs (Past 2hrs)        Date/Time Vitals Session Patient Position Pulse Resp SpO2 BP MAP (mmHg)    01/06/25 1032 Pre PT  Lying  66  --  99 %  119/73  84     01/06/25 1110 Post PT  Sitting  70  --  99 %  100/72  81                        Objective   Pain:  Pain Assessment  Pain Assessment: 0-10  0-10 (Numeric) Pain Score: 0 - No pain  Cognition:  Cognition  Overall Cognitive Status: Within Functional Limits  Orientation Level: Oriented X4  Cognition Comments: Moments of slight confusion with directions during ambulation/FWW management    General Assessments:       Activity Tolerance  Endurance: Tolerates  10 - 20 min exercise with multiple rests  Early Mobility/Exercise Safety Screen: Proceed with mobilization - No exclusion criteria met  Activity Tolerance Comments: Vitals stable throughout session         Strength  Strength Comments: Grossly at least 3/5 based on functional mobility  Strength  Strength Comments: Grossly at least 3/5 based on functional mobility    Perception  Inattention/Neglect: Appears intact  Initiation: Appears intact  Motor Planning: Appears intact  Perseveration: Not present      Coordination  Movements are Fluid and Coordinated: Yes    Postural Control  Posture Comment: fwd head, rounded shoulders    Static Sitting Balance  Static Sitting-Balance Support: Feet supported, Bilateral upper extremity supported  Static Sitting-Level of Assistance: Close supervision    Static Standing Balance  Static Standing-Balance Support: Bilateral upper extremity supported  Static Standing-Level of Assistance: Contact guard  Functional Assessments:  Bed Mobility  Bed Mobility: Yes  Bed Mobility 1  Bed Mobility 1: Supine to sitting  Level of Assistance 1: Close supervision  Bed Mobility Comments 1: SBA for safety and line management    Transfers  Transfer: Yes  Transfer 1  Transfer From 1: Sit to, Stand to  Transfer to 1: Stand, Sit  Technique 1: Sit to stand, Stand to sit  Transfer Device 1: Walker  Transfer Level of Assistance 1: Contact guard  Trials/Comments 1: CGA for safety and line management; verbal cues for hand placement on FWW. x2 reps during session  Transfers 2  Transfer From 2: Bed to  Transfer to 2: Chair with arms  Technique 2: Stand pivot  Transfer Level of Assistance 2: Minimum assistance, Arm in arm assistance  Trials/Comments 2: Min A for hip elevation to stand + balance during side step to chair    Ambulation/Gait Training  Ambulation/Gait Training Performed: Yes  Ambulation/Gait Training 1  Surface 1: Level tile  Device 1: Rolling walker  Gait Support Devices:  (chair follow)  Assistance  1: Minimum assistance  Quality of Gait 1: Inconsistent stride length, Narrow base of support, Decreased step length, Forward flexed posture, Scissoring  Comments/Distance (ft) 1: x80' with FWW; Min A for balance and FWW management- pt demonstrating difficulty with FWW during turning and slight confusion when given directional cues.  Extremity/Trunk Assessments:  Cervical Spine   Cervical Spine: Within Functional Limits  Lumbar Spine   Lumbar Spine : Within Functional Limits    Strength RLE  R Knee Extension: 4/5  R Ankle Dorsiflexion: 4/5  R Ankle Plantar Flexion: 4+/5  Strength LLE  L Knee Extension: 4/5  L Ankle Dorsiflexion: 4/5  L Ankle Plantar Flexion: 4+/5  Treatments:  Ambulation/Gait Training  Ambulation/Gait Training Performed: Yes  Ambulation/Gait Training 1  Surface 1: Level tile  Device 1: Rolling walker  Gait Support Devices:  (chair follow)  Assistance 1: Minimum assistance  Quality of Gait 1: Inconsistent stride length, Narrow base of support, Decreased step length, Forward flexed posture, Scissoring  Comments/Distance (ft) 1: x80' with FWW; Min A for balance and FWW management- pt demonstrating difficulty with FWW during turning and slight confusion when given directional cues.  Outcome Measures:  [facility] Basic Mobility  Turning from your back to your side while in a flat bed without using bedrails: A little  Moving from lying on your back to sitting on the side of a flat bed without using bedrails: A little  Moving to and from bed to chair (including a wheelchair): A little  Standing up from a chair using your arms (e.g. wheelchair or bedside chair): A little  To walk in hospital room: A lot  Climbing 3-5 steps with railing: A lot  Basic Mobility - Total Score: 16    FSS-ICU  Ambulation: Walks <50 feet with any assistance x1 or walks any distance with assistance x2 people  Rolling: Supervision or set-up only  Sitting: Supervision or set-up only  Transfer Sit-to-Stand: Minimal assistance (performs 75%  or more of task)  Transfer Supine-to-Sit: Supervision or set-up only  Total Score: 20      Early Mobility/Exercise Safety Screen: Proceed with mobilization - No exclusion criteria met  ICU Mobility Scale: Walking with assistance of 2 or more people [7]    Encounter Problems       Encounter Problems (Active)       Balance       Pt will demonstrate improved sitting/standing static/dynamic balance activities without LOB via score of 24/28 on the [place] for increase in safety prior to DC.  (Progressing)       Start:  01/06/25    Expected End:  01/20/25               Mobility       Pt will tolerate >15 minutes of upright standing activity without seated rest breaks with no changes in vital signs for improved functional mobility.  (Progressing)       Start:  01/06/25    Expected End:  01/20/25            Pt will ambulate >100ft with appropriate form, SBA or less, LRAD, and no LOB for safe DC.  (Progressing)       Start:  01/06/25    Expected End:  01/20/25            Pt will ambulate up/down >3 stairs with hand rail with CGA or less to safely access their home upon DC.   (Progressing)       Start:  01/06/25    Expected End:  01/20/25               PT Transfers       Pt will perform bed mobility and sit<>stand transfers with SBA or less and use of LRAD to safely DC.  (Progressing)       Start:  01/06/25    Expected End:  01/20/25                   Education Documentation  Precautions, taught by Richie Charles PT at 1/6/2025 12:08 PM.  Learner: Patient  Readiness: Acceptance  Method: Explanation  Response: Verbalizes Understanding    Body Mechanics, taught by Richie Charles PT at 1/6/2025 12:08 PM.  Learner: Patient  Readiness: Acceptance  Method: Explanation  Response: Verbalizes Understanding    Mobility Training, taught by Richie Charles PT at 1/6/2025 12:08 PM.  Learner: Patient  Readiness: Acceptance  Method: Explanation  Response: Verbalizes Understanding    Education Comments  No comments found.    RICHIE  LOGAN, PT

## 2025-01-06 NOTE — H&P
History Of Present Illness  Wojciech Kern is a 74 y.o. male with PMHx of ICM/HFrEF (EF 26% 2024) s/p ICD (), CAD s/p HELEN x4 to RCA c/b VSD s/p CABG x1 (SVG-LAD and VSD repair in ), HTN, DLD, Emphysema, CKD3b presenting as a transfer from [facility] ED to  CICU with ICD shocks.    He states he was out shoveling snow earlier this morning after he came back in he felt to shocks and started to feel chest pain and shortness of breath as well. Denies any symptoms while he was shoveling snow his symptoms came on when he returned inside his home and was resting. Denies any symptoms or pain currently. Via EMS he was in V tach received amiodarone and route and was able to break out of the rhythm. No additional medications or interventions were performed by EMS. States prior to this episode when EMS was called by his wife he felt fine and has had no sick like symptoms prior to this or throughout the week. States he has otherwise been feeling well. States the symptoms do not feel similar to the symptoms he experienced when he had his heart attack. He said during that time he had significant abdominal pain and this feels different. He does not feel like he is completely back to normal or asymptomatic at this time however does not have any chest pain, shortness of breath or concerning symptoms that initially caused his wife to call 911. No other associate signs or symptoms report this time.     [facility] ED Course:   Vitals: T    HR 79  BP 90/56  RR 16  O2 95 % None (Room air)     Labs:  -CBC: 8.8/10.2/31.6/186  -RFP: 135/5/100/27/83/2.74//178  -Ma.67  -Troponin: 89<1035<1733<3507<4620<5744  -Coags: INR 1.1/PT 12.9  -VBG: pH 7.37, CO2 49, lactate 1.8    Imaging:  -EKG: NSR, rate 77, regular axis, ST depression V5-6, TWI in II,III,AVF (old)  -CXR: Probable left basilar atelectasis.     Interventions: ASA 324mg, heparin gtt, lidocaine gtt    Cardiac Hx:   Echo:  TTE 12/10/24   1. Left ventricular ejection fraction is  severely decreased, calculated by Mckeon's biplane at 26%.   2. There is global hypokinesis of the left ventricle with minor regional variations.   3. Spectral Doppler shows an abnormal pattern of left ventricular diastolic filling.   4. Left ventricular cavity size is moderate to severely dilated.   5. 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   6. No left ventricular thrombus visualized.   7. There is severely increased eccentric left ventricular hypertrophy.   8. There is normal right ventricular global systolic function.   9. The left atrium is severely dilated.  10. Mild to moderate mitral valve regurgitation.  11. Moderately decreased mitral valve posterior leaflet mobility.  12. Right ventricular systolic pressure is within normal limits.     TTE 11/7/23   1. Left ventricular systolic function is severely decreased with a 25-30% estimated ejection fraction.   2. There is global hypokinesis of the left ventricle with minor regional variations.   3. The inferior and infero lateral wall are the most hypokinetic. 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 There is a 1.4 x 1.4 cm echodensity in the submitral apparatus (clip 110). DDx includes calcified papillary muscle (favored) v less likely thrombus.   4. There is moderate, functional mitral valve regurgitation which is eccentrically directed. The degree of mitral regurgitation may be underestimated due to the eccentricity of the jet.   5. There is mildly reduced right ventricular systolic function.   6. The left atrium is severely dilated.   7. RVSP within normal limits.   8. Compared with study from 9/10/2022, there is now at least moderate functional mitral regurgitation, which may be underestimated due to the eccentricity of the jet. A bright echodensity in the submitral apparatus is seen in some views more easily on the current study, though it also appears in certain clips on the prior  study (clip 39) and favored to be a calcified papillary muscle. Comparison of the size of the LV aneurysm is challenging due to technical limitations.     Cath:  [facility] 9/12/22   1. Severe single vessel CAD in a right dominant system.   2. Moderate LM disease similar to prior angiography.   3. Patent RCA stents to level of RPDA. Subintimal stent in RPLB occluded.   4. Patent SVG-LAD.   5. Mildly elevated LVEDP.   6. No aortic stenosis.     [facility] 7/6/2017  CONCLUSIONS:   1. Distal single-vessel coronary artery disease in a right dominant system.   2. Patent SVG-LAD.   3. Severe left ventricular systolic dysfunction with large posterobasal aneurysm.   4. Severe mitral regurgitation.     Past Medical History  ICM/HFrEF (EF 26% 12/2024) s/p ICD (2018), CAD s/p HELEN x4 to RCA c/b VSD s/p CABG x1 (SVG-LAD and VSD repair in 2015), HTN, DLD, Emphysema, CKD3b    Surgical History  Past Surgical History:   Procedure Laterality Date    CATARACT EXTRACTION Bilateral 03/26/2016    Cataract Surgery    CORONARY ARTERY BYPASS GRAFT  02/26/2018    CABG        Social History      Family History  Family History   Problem Relation Name Age of Onset    Coronary artery disease Mother      Hypertension Father          Allergies  Penicillins and Sulfa (sulfonamide antibiotics)    Current Outpatient Medications   Medication Instructions    aspirin 81 mg EC tablet 1 tablet, Daily    clopidogrel (PLAVIX) 75 mg, oral, Daily    lisinopril 10 mg, oral, Daily, Take first dose 36 hours after your last dose of entresto    multivitamin (MULTIPLE VITAMINS ORAL) 1 tablet, oral, Daily    rosuvastatin (CRESTOR) 20 mg, oral, Daily    spironolactone (ALDACTONE) 12.5 mg, oral, Daily    tiotropium (Spiriva Respimat) 2.5 mcg/actuation inhaler 2 puffs, inhalation, Daily RT    torsemide (DEMADEX) 50 mg, oral, Daily         Physical Exam     Last Recorded Vitals      1/5/2025     6:30 PM 1/5/2025     6:45 PM 1/5/2025     7:00 PM 1/5/2025     7:15 PM 1/5/2025     7:30  PM 1/5/2025     7:45 PM 1/5/2025     8:00 PM   Vitals   Systolic 118 125 122 126 123 124 143   Diastolic 75 68 72 68 70 88 83   Heart Rate 61 58 60 60 60 66 73   Resp 15      20         Assessment/Plan   Wojciech Kern is a 74 y.o. male with PMHx of ICM/HFrEF (EF 26% 12/2024) s/p ICD (2018), CAD s/p HELEN x4 to RCA c/b VSD s/p CABG x1 (SVG-LAD and VSD repair in 2015), HTN, DLD, Emphysema, CKD3b presenting as a transfer from [facility] ED to  CICU with ICD shocks. Patient felt ICD fire x2 while shoveling snow. Maintaining in NSR while in lidocaine gtt. He may need repeat coronary angiogram for ischemic eval.      Neuro:  -LORELEI    Cardiac:  #VT s/p ICD shock x2  #NSEMI   #ICM/HFrEF (EF 26% 12/2024) s/p ICD (2018)  #CAD s/p HELEN x4 to RCA c/b VSD s/p CABG x1 (SVG-LAD and VSD repair in 2015)  #HTN #DLD  -F/up device interrogation   -Consult EP in the AM  -Continue lidocaine gtt  -Maintain K>4, Mag>2  -Consider [facility] for ischemic evaluation   -Trend troponin (89<1035<1733<3507<4620<5744)  -Continue heparin gtt  -Continue ASA and statin  -Hold plavix (last dose X)   -Hold home lisinopril 10mg, spironolactone 12.5mg and torsemide 50mg daily iso SPENCER     Pulm:  #Emphysema   -Continue Spiriva inhaler     GI:  -LORELEI    Renal:  #SPENCER on CKD3  ::bl ~Cr 1.5-1.8  -Avoid nephrotoxins and really dose meds  -F/up UA and urine electrolytes     F: PRN  E: K>4, Mag>2   N: cardiac   G: none  A: PIV  DVT: heparin gtt  CODE:  NOK: Imelda (wife) [phone]       Virginia Leal MD  Internal Medicine, PGY3

## 2025-01-06 NOTE — PROGRESS NOTES
"Wojciech Kern is a 74 y.o. male on day 1 of admission presenting with VT (ventricular tachycardia) (Multi).    Subjective   NAEON. Patient feels well this morning. Said that he had some palpitations overnight, but ECG did not show any arrhythmia. Aside from that, he denies chest pain, palpitations, lightheadedness/dizziness, nausea, vomiting.     Patient said that he has not been having any anginal symptoms in the days/weeks leading up to his shock yesterday. He denies any previous shocks.        Objective     Physical Exam  Constitutional:       General: He is not in acute distress.     Appearance: Normal appearance. He is normal weight. He is not ill-appearing or toxic-appearing.   HENT:      Head: Normocephalic and atraumatic.      Mouth/Throat:      Mouth: Mucous membranes are moist.   Eyes:      Extraocular Movements: Extraocular movements intact.      Conjunctiva/sclera: Conjunctivae normal.      Pupils: Pupils are equal, round, and reactive to light.   Cardiovascular:      Rate and Rhythm: Normal rate and regular rhythm.      Pulses: Normal pulses.      Heart sounds: No murmur heard.     No friction rub. No gallop.   Pulmonary:      Effort: Pulmonary effort is normal. No respiratory distress.      Breath sounds: Normal breath sounds. No wheezing or rhonchi.   Abdominal:      General: Abdomen is flat. Bowel sounds are normal.      Palpations: Abdomen is soft.   Musculoskeletal:         General: Normal range of motion.      Cervical back: No rigidity.      Right lower leg: No edema.      Left lower leg: No edema.   Skin:     General: Skin is warm and dry.      Capillary Refill: Capillary refill takes less than 2 seconds.   Neurological:      General: No focal deficit present.      Mental Status: He is alert and oriented to person, place, and time.         Last Recorded Vitals  Blood pressure 114/70, pulse 59, temperature 36.1 °C (97 °F), temperature source Temporal, resp. rate 16, height 1.778 m (5' 10\"), " weight 66.4 kg (146 lb 6.2 oz), SpO2 97%.  Intake/Output last 3 Shifts:  No intake/output data recorded.    Relevant Results  Scheduled medications  aspirin, 81 mg, oral, Daily  [Held by provider] clopidogrel, 75 mg, oral, Daily  [Held by provider] lisinopril, 10 mg, oral, Daily  rosuvastatin, 20 mg, oral, Daily  [Held by provider] spironolactone, 12.5 mg, oral, Daily  tiotropium, 2 puff, inhalation, Daily  [Held by provider] torsemide, 50 mg, oral, Daily      Continuous medications  heparin, 0-4,000 Units/hr, Last Rate: 800 Units/hr (01/06/25 0600)  lidocaine, 1 mg/min, Last Rate: 1 mg/min (01/06/25 0600)      PRN medications  PRN medications: heparin  Results for orders placed or performed during the hospital encounter of 01/05/25 (from the past 24 hours)   CBC   Result Value Ref Range    WBC 8.4 4.4 - 11.3 x10*3/uL    nRBC 0.0 0.0 - 0.0 /100 WBCs    RBC 3.16 (L) 4.50 - 5.90 x10*6/uL    Hemoglobin 10.2 (L) 13.5 - 17.5 g/dL    Hematocrit 29.7 (L) 41.0 - 52.0 %    MCV 94 80 - 100 fL    MCH 32.3 26.0 - 34.0 pg    MCHC 34.3 32.0 - 36.0 g/dL    RDW 13.1 11.5 - 14.5 %    Platelets 214 150 - 450 x10*3/uL   Comprehensive Metabolic Panel   Result Value Ref Range    Glucose 97 74 - 99 mg/dL    Sodium 135 (L) 136 - 145 mmol/L    Potassium 4.2 3.5 - 5.3 mmol/L    Chloride 100 98 - 107 mmol/L    Bicarbonate 25 21 - 32 mmol/L    Anion Gap 14 10 - 20 mmol/L    Urea Nitrogen 78 (H) 6 - 23 mg/dL    Creatinine 2.57 (H) 0.50 - 1.30 mg/dL    eGFR 25 (L) >60 mL/min/1.73m*2    Calcium 9.4 8.6 - 10.6 mg/dL    Albumin 4.2 3.4 - 5.0 g/dL    Alkaline Phosphatase 53 33 - 136 U/L    Total Protein 7.9 6.4 - 8.2 g/dL    AST 39 9 - 39 U/L    Bilirubin, Total 0.6 0.0 - 1.2 mg/dL    ALT 18 10 - 52 U/L   Magnesium   Result Value Ref Range    Magnesium 2.59 (H) 1.60 - 2.40 mg/dL   BLOOD GAS VENOUS FULL PANEL   Result Value Ref Range    POCT pH, Venous 7.40 7.33 - 7.43 pH    POCT pCO2, Venous 41 41 - 51 mm Hg    POCT pO2, Venous 53 (H) 35 - 45 mm Hg     POCT SO2, Venous 84 (H) 45 - 75 %    POCT Oxy Hemoglobin, Venous 82.2 (H) 45.0 - 75.0 %    POCT Hematocrit Calculated, Venous 32.0 (L) 41.0 - 52.0 %    POCT Sodium, Venous 136 136 - 145 mmol/L    POCT Potassium, Venous 4.5 3.5 - 5.3 mmol/L    POCT Chloride, Venous 104 98 - 107 mmol/L    POCT Ionized Calicum, Venous 1.23 1.10 - 1.33 mmol/L    POCT Glucose, Venous 92 74 - 99 mg/dL    POCT Lactate, Venous 0.6 0.4 - 2.0 mmol/L    POCT Base Excess, Venous 0.5 -2.0 - 3.0 mmol/L    POCT HCO3 Calculated, Venous 25.4 22.0 - 26.0 mmol/L    POCT Hemoglobin, Venous 10.8 (L) 13.5 - 17.5 g/dL    POCT Anion Gap, Venous 11.0 10.0 - 25.0 mmol/L    Patient Temperature 37.0 degrees Celsius    FiO2 21 %   Troponin I, High Sensitivity   Result Value Ref Range    Troponin I, High Sensitivity (CMC) 9,896 (HH) 0 - 53 ng/L   Type and Screen   Result Value Ref Range    ABO TYPE B     Rh TYPE POS     ANTIBODY SCREEN NEG    Lidocaine   Result Value Ref Range    Lidocaine  2.2 1.0 - 5.0 ug/mL   Heparin Assay, UFH   Result Value Ref Range    Heparin Unfractionated 0.4 See Comment Below for Therapeutic Ranges IU/mL   Iron and TIBC   Result Value Ref Range    Iron 92 35 - 150 ug/dL    UIBC 184 110 - 370 ug/dL    TIBC 276 240 - 445 ug/dL    % Saturation 33 25 - 45 %   Ferritin   Result Value Ref Range    Ferritin 176 20 - 300 ng/mL   Transferrin   Result Value Ref Range    Transferrin 215 200 - 360 mg/dL   Urine electrolytes   Result Value Ref Range    Sodium, Urine Random 65 mmol/L    Sodium/Creatinine Ratio 91 Not established. mmol/g Creat    Potassium, Urine Random 28 mmol/L    Potassium/Creatinine Ratio 39 Not established mmol/g Creat    Chloride, Urine Random 70 mmol/L    Chloride/Creatinine Ratio 98 23 - 275 mmol/g creat    Creatinine, Urine Random 71.6 20.0 - 370.0 mg/dL   Troponin I, High Sensitivity   Result Value Ref Range    Troponin I, High Sensitivity (CMC) 8,369 (HH) 0 - 53 ng/L   Urinalysis with Reflex Culture and Microscopic    Result Value Ref Range    Color, Urine Light-Yellow Light-Yellow, Yellow, Dark-Yellow    Appearance, Urine Clear Clear    Specific Gravity, Urine 1.012 1.005 - 1.035    pH, Urine 6.0 5.0, 5.5, 6.0, 6.5, 7.0, 7.5, 8.0    Protein, Urine NEGATIVE NEGATIVE, 10 (TRACE), 20 (TRACE) mg/dL    Glucose, Urine Normal Normal mg/dL    Blood, Urine NEGATIVE NEGATIVE    Ketones, Urine NEGATIVE NEGATIVE mg/dL    Bilirubin, Urine NEGATIVE NEGATIVE    Urobilinogen, Urine Normal Normal mg/dL    Nitrite, Urine NEGATIVE NEGATIVE    Leukocyte Esterase, Urine NEGATIVE NEGATIVE   CBC   Result Value Ref Range    WBC 7.1 4.4 - 11.3 x10*3/uL    nRBC 0.0 0.0 - 0.0 /100 WBCs    RBC 3.42 (L) 4.50 - 5.90 x10*6/uL    Hemoglobin 10.9 (L) 13.5 - 17.5 g/dL    Hematocrit 32.0 (L) 41.0 - 52.0 %    MCV 94 80 - 100 fL    MCH 31.9 26.0 - 34.0 pg    MCHC 34.1 32.0 - 36.0 g/dL    RDW 13.2 11.5 - 14.5 %    Platelets 216 150 - 450 x10*3/uL   Renal Function Panel   Result Value Ref Range    Glucose 102 (H) 74 - 99 mg/dL    Sodium 136 136 - 145 mmol/L    Potassium 4.3 3.5 - 5.3 mmol/L    Chloride 102 98 - 107 mmol/L    Bicarbonate 27 21 - 32 mmol/L    Anion Gap 11 10 - 20 mmol/L    Urea Nitrogen 75 (H) 6 - 23 mg/dL    Creatinine 2.48 (H) 0.50 - 1.30 mg/dL    eGFR 26 (L) >60 mL/min/1.73m*2    Calcium 9.4 8.6 - 10.6 mg/dL    Phosphorus 4.6 2.5 - 4.9 mg/dL    Albumin 4.2 3.4 - 5.0 g/dL   Magnesium   Result Value Ref Range    Magnesium 2.59 (H) 1.60 - 2.40 mg/dL   Heparin Assay, UFH   Result Value Ref Range    Heparin Unfractionated 0.3 See Comment Below for Therapeutic Ranges IU/mL     *Note: Due to a large number of results and/or encounters for the requested time period, some results have not been displayed. A complete set of results can be found in Results Review.   XR chest 1 view    Result Date: 1/5/2025  STUDY: Chest Radiograph;  01/05/2025 1:03 PM INDICATION: Chest pain. COMPARISON: 02/18/2024 XR Chest. 08/20/2023 XR Chest. ACCESSION NUMBER(S):  [id number] ORDERING CLINICIAN: STEFFEN SIMERLINK TECHNIQUE:  Frontal chest was obtained at 13:03 hours. FINDINGS: The patient status post median sternotomy.  There is a defibrillator present. CARDIOMEDIASTINAL SILHOUETTE: Cardiomediastinal silhouette is enlarged..  LUNGS: The previous findings suggestive of pulmonary edema have decreased. There is still partial silhouetting of the left hemidiaphragm most likely representing atelectasis.  ABDOMEN: No remarkable upper abdominal findings.  BONES: No acute osseous changes.    Probable left basilar atelectasis. Signed by Rich Kinsey MD    Transthoracic echo (TTE) complete    Result Date: 12/11/2024   [facility], [address] [place] [address]              Tel [phone] and Fax [phone] TRANSTHORACIC ECHOCARDIOGRAM REPORT  Patient Name:       DANICA DUPONT SHERRI Mccall Physician:    [id number] Zackery Galdamez DO Study Date:         12/10/2024          Ordering Provider:    [id number] JAMIE BAGLEY MRN/PID:            [id number]            Fellow: Accession#:         [id number]        Nurse: Date of Birth/Age:  1950 / 74 years Sonographer:          Aryan Smiley RDCS Gender assigned at  M                   Additional Staff: Birth: Height:             175.00 cm           Admit Date:           12/10/2024 Weight:             70.00 kg            Admission Status:     Outpatient BSA / BMI:          1.85 m2 / 22.86     Encounter#:           [id number]                     kg/m2 Blood Pressure:     130/80 mmHg         Department Location:  [facility] Non                                                               Invasive Study Type:    TRANSTHORACIC ECHO (TTE) COMPLETE Diagnosis/ICD: Atherosclerotic heart disease of native coronary artery without                angina pectoris-I25.10 Indication:    CAD CPT Code:      Echo Complete  w Full [id number] Patient History: Valve Disorders:   Mitral Regurgitation. Pacer/Defib:       AICD Pertinent History: CAD. NSTEMI, HFrEF, LV aneurysm. Study Detail: The following Echo studies were performed: 2D, M-Mode, Doppler and               color flow. Technically challenging study due to body habitus and               poor acoustic windows. Definity used as a contrast agent for               endocardial border definition. Total contrast used for this               procedure was 3 mL via IV push.  PHYSICIAN INTERPRETATION: Left Ventricle: Left ventricular ejection fraction is severely decreased, calculated by Mckeon's biplane at 26%. There is severely increased eccentric left ventricular hypertrophy. There is global hypokinesis of the left ventricle with minor regional variations. The left ventricular cavity size is moderate to severely dilated. There is normal septal and normal posterior left ventricular wall thickness. Spectral Doppler shows an abnormal pattern of left ventricular diastolic filling. There is no definite left ventricular thrombus visualized. 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 Left Atrium: The left atrium is severely dilated. Right Ventricle: The right ventricle is normal in size. There is normal right ventricular global systolic function. Right Atrium: The right atrium is normal in size. Aortic Valve: The aortic valve is trileaflet. The aortic valve dimensionless index is 0.65. There is no evidence of aortic valve regurgitation. The peak instantaneous gradient of the aortic valve is 5 mmHg. The mean gradient of the aortic valve is 3 mmHg. Mitral Valve: The mitral valve is mild to moderately thickened. There is moderately decreased mitral valve posterior leaflet mobility. The peak instantaneous gradient of the mitral valve is 5 mmHg. There is mild to moderate mitral valve regurgitation which is eccentrically directed. The mitral  regurgitant orifice area is 8 mm2. The mitral regurgitant volume is 14.09 ml. Tricuspid Valve: The tricuspid valve is structurally normal. There is trace to mild tricuspid regurgitation. The Doppler estimated RVSP is within normal limits at 17.5 mmHg. Pulmonic Valve: The pulmonic valve is structurally normal. There is physiologic pulmonic valve regurgitation. Pericardium: There is no pericardial effusion noted. Aorta: The aortic root is normal. There is mild dilatation of the ascending aorta. There is no dilatation of the aortic root. In comparison to the previous echocardiogram(s): Compared with study dated 11/7/2023, no significant change.  CONCLUSIONS:  1. Left ventricular ejection fraction is severely decreased, calculated by Mckeon's biplane at 26%.  2. There is global hypokinesis of the left ventricle with minor regional variations.  3. Spectral Doppler shows an abnormal pattern of left ventricular diastolic filling.  4. Left ventricular cavity size is moderate to severely dilated.  5. 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  6. No left ventricular thrombus visualized.  7. There is severely increased eccentric left ventricular hypertrophy.  8. There is normal right ventricular global systolic function.  9. The left atrium is severely dilated. 10. Mild to moderate mitral valve regurgitation. 11. Moderately decreased mitral valve posterior leaflet mobility. 12. Right ventricular systolic pressure is within normal limits. QUANTITATIVE DATA SUMMARY:  2D MEASUREMENTS:           Normal Ranges: IVSd:            1.02 cm   (0.6-1.1cm) LVPWd:           0.64 cm   (0.6-1.1cm) LVIDd:           10.18 cm  (3.9-5.9cm) LVIDs:           8.31 cm LV Mass Index:   385 g/m2 LVEDV Index:     122 ml/m2 LV % FS          18.3 %  LA VOLUME:                    Normal Ranges: LA Vol A4C:        79.6 ml    (22+/-6mL/m2) LA Vol A2C:        93.8 ml LA Vol BP:         89.4 ml LA Vol Index  A4C:  43.1ml/m2 LA Vol Index A2C:  50.8 ml/m2 LA Vol Index BP:   48.3 ml/m2 LA Area A4C:       24.1 cm2 LA Area A2C:       25.3 cm2 LA Major Axis A4C: 6.2 cm LA Major Axis A2C: 5.8 cm LA Volume Index:   48.3 ml/m2 LA Vol A4C:        75.4 ml LA Vol A2C:        87.0 ml LA Vol Index BSA:  43.9 ml/m2  RA VOLUME BY A/L METHOD:          Normal Ranges: RA Area A4C:             15.9 cm2  AORTA MEASUREMENTS:         Normal Ranges: Ao Sinus, d:        3.10 cm (2.1-3.5cm) Ao STJ, d:          3.30 cm (1.7-3.4cm) Asc Ao, d:          3.60 cm (2.1-3.4cm)  LV SYSTOLIC FUNCTION BY 2D PLANIMETRY (MOD):                      Normal Ranges: EF-A4C View:    37 % (>=55%) EF-A2C View:    24 % EF-Biplane:     26 % LV EF Reported: 26 %  LV DIASTOLIC FUNCTION:             Normal Ranges: MV Peak E:             0.64 m/s    (0.7-1.2 m/s) MV Peak A:             0.94 m/s    (0.42-0.7 m/s) E/A Ratio:             0.68        (1.0-2.2) MV e'                  0.055 m/s   (>8.0) MV lateral e'          0.08 m/s MV medial e'           0.03 m/s E/e' Ratio:            11.59       (<8.0) PulmV Sys Cricket:         44.36 cm/s PulmV Kay Cricket:        14.21 cm/s PulmV S/D Cricket:         3.12 PulmV A Revs Cricket:      25.02 cm/s PulmV A Revs Dur:      106.11 msec  MITRAL VALVE:          Normal Ranges: MV Vmax:      1.15 m/s (<=1.3m/s) MV peak P.2 mmHg (<5mmHg) MV mean P.8 mmHg (<2mmHg) MV VTI:       30.25 cm (10-13cm) MV DT:        275 msec (150-240msec)  MITRAL INSUFFICIENCY:             Normal Ranges: MR VTI:               176.26 cm MR Vmax:              484.55 cm/s MR Volume:            14.09 ml MR Flow Rt:           38.72 ml/s MR EROA:              8 mm2  AORTIC VALVE:                     Normal Ranges: AoV Vmax:                1.15 m/s (<=1.7m/s) AoV Peak P.3 mmHg (<20mmHg) AoV Mean PG:             3.5 mmHg (1.7-11.5mmHg) LVOT Max Cricket:            0.78 m/s (<=1.1m/s) AoV VTI:                 26.88 cm (18-25cm) LVOT VTI:                 17.44 cm LVOT Diameter:           2.27 cm  (1.8-2.4cm) AoV Area, VTI:           2.62 cm2 (2.5-5.5cm2) AoV Area,Vmax:           2.74 cm2 (2.5-4.5cm2) AoV Dimensionless Index: 0.65  RIGHT VENTRICLE: RV Basal 3.40 cm RV Mid   2.00 cm RV Major 7.5 cm TAPSE:   14.0 mm  TRICUSPID VALVE/RVSP:          Normal Ranges: Peak TR Velocity:     1.90 m/s Est. RA Pressure:     3 mmHg RV Syst Pressure:     17 mmHg  (< 30mmHg) IVC Diam:             1.60 cm  PULMONIC VALVE:          Normal Ranges: PV Accel Time:  129 msec (>120ms) PV Max Cricket:     1.0 m/s  (0.6-0.9m/s) PV Max P.4 mmHg  Pulmonary Veins: PulmV A Revs Dur: 106.11 msec PulmV A Revs Cricket: 25.02 cm/s PulmV Kay Cricket:   14.21 cm/s PulmV S/D Cricket:    3.12 PulmV Sys Cricket:    44.36 cm/s  [id number] Zackery Galdamez DO Electronically signed on 2024 at 7:15:10 PM  ** Final **               Assessment/Plan   Assessment & Plan  VT (ventricular tachycardia) (Multi)    Wojciech Kern is a 74 y.o. male with PMHx of ICM/HFrEF (EF 26% 2024) s/p ICD (), CAD s/p HELEN x4 to RCA c/b VSD s/p CABG x1 (SVG-LAD and VSD repair in ), HTN, DLD, Emphysema, CKD3b presenting as a transfer from [facility] ED to  CICU with ICD shocks and chest pain. Patient felt ICD fire x2 while shoveling snow and found to be in VT by EMS. Plan for EP consult and ischemic eval.     - EP consulted  - 500mL LR bolus/2hrs  - [facility]      Neuro:  #Neuropathy  -Not currently on any home meds, consider lidocaine cream      Cardiac:  #VT s/p ICD shock x2  #NSEMI   #ICM/HFrEF (EF 26% 2024) s/p ICD ()  #CAD s/p HELEN x4 to RCA c/b VSD s/p CABG x1 (SVG-LAD and VSD repair in )  #HTN #DLD  -F/up device interrogation  - Trop peak 9896  -EP consulted   -Continue lidocaine gtt @1  -Maintain K>4, Mag>2  -Continue heparin gtt  -Continue ASA and statin  -Hold plavix (last dose 1/5AM)   -Hold home lisinopril 10mg, spironolactone 12.5mg iso SPENCER; patient was not on entresto or empagliflozin 2/2 cost  - coreg previously  discontinued 2/2 bradycardia   -Hold torsemide 50mg daily, pt appears euvolemic on exam, reassess vol status daily   - Continue lidocaine for now (bradycardia w/ amiodarone), consider converting to oral amiodarone 1/7; appreciate EP recommendations      Pulm:  #Emphysema   -Continue Spiriva inhaler      GI:  -LORELEI     Renal:  #SPENCER on CKD3  ::bl ~Cr 1.5-1.8  -Avoid nephrotoxins and really dose meds  -500mL LR bolus today given SPENCER, doesn't look volume overloaded  - repeat RFP     Heme/Onc  #Normocytic Anemia  ::bl Hgb 10-11  -F/up anemia labs     F: PRN  E: K>4, Mag>2   N: cardiac; NPO at midnight  G: none  A: PIV  DVT: heparin gtt  Gtt: Heparin, lidocaine  CODE: FULL (confirmed on admission)   NOK: Imelda (wife) [phone]            Paresh Moore MD

## 2025-01-06 NOTE — CARE PLAN
Problem: Safety - Adult  Goal: Free from fall injury  Outcome: Progressing     Problem: Fall/Injury  Goal: Not fall by end of shift  Outcome: Progressing  Goal: Be free from injury by end of the shift  Outcome: Progressing  Goal: Verbalize understanding of personal risk factors for fall in the hospital  Outcome: Progressing  Goal: Verbalize understanding of risk factor reduction measures to prevent injury from fall in the home  Outcome: Progressing  Goal: Use assistive devices by end of the shift  Outcome: Progressing  Goal: Pace activities to prevent fatigue by end of the shift  Outcome: Progressing

## 2025-01-06 NOTE — H&P
History Of Present Illness  Wojciech Kern is a 74 y.o. male with PMHx of ICM/HFrEF (EF 26% 2024) s/p ICD (), CAD s/p HELEN x4 to RCA c/b VSD s/p CABG x1 (SVG-LAD and VSD repair in ), HTN, DLD, Emphysema, CKD3b presenting as a transfer from [facility] ED to  CICU with ICD shocks.    He states he was out shoveling snow earlier this morning (24) and started feeling unwell. He was feeling short of breath and chest soreness. States chest soreness was 3/10, nonradiating, in the center of his chest. He then went inside his house and sat down to rest when he felt his ICD fire twice. He took a sl nitroglycerin and his chest pain resolved shortly after. He states he felt fine all day. He has never felt his ICD fire before. He states he usually walks daily and goes to the gym and has never felt chest soreness/sob, however he has not exerted himself much lately until he shovelled his snow today. States sx are different from when he had an MI in the past (had abdominal pain at that time). Otherwise denies fevers, chills, recent illness/sick contacts, abdominal pain, nausea, vomiting, diarrhea, urinary sx, LE edema. States he has been complaint with his home meds.   He called 911 and EMS noted patient was still in Vtach. He received amiodarone en route which converted him to sinus rhythm.   Upon arrival to [facility] CICU, pt denies chest pain, palpitations, sob. He does endorse bl LE tingling. He states he has neuropathy and sometimes has these sx but usually not in both legs at the same time. He states he is nervous and very concerned about his heart.       [facility] ED Course:   Vitals: T    HR 79  BP 90/56  RR 16  O2 95 % None (Room air)     Labs:  -CBC: 8.8/10.2/31.6/186  -RFP: 135/5/100/27/83/2.74//178  -Ma.67  -Troponin: 89<1035<1733<3507<4620<5744  -Coags: INR 1.1/PT 12.9  -VBG: pH 7.37, CO2 49, lactate 1.8    Imaging:  -EKG: NSR, rate 77, regular axis, ST depression V5-6, TWI in II,III,AVF (old)  -CXR: Probable  left basilar atelectasis.     Interventions: ASA 324mg, heparin gtt, lidocaine gtt    Cardiac Hx:   Echo:  TTE 12/10/24   1. Left ventricular ejection fraction is severely decreased, calculated by Mckeon's biplane at 26%.   2. There is global hypokinesis of the left ventricle with minor regional variations.   3. Spectral Doppler shows an abnormal pattern of left ventricular diastolic filling.   4. Left ventricular cavity size is moderate to severely dilated.   5. 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   6. No left ventricular thrombus visualized.   7. There is severely increased eccentric left ventricular hypertrophy.   8. There is normal right ventricular global systolic function.   9. The left atrium is severely dilated.  10. Mild to moderate mitral valve regurgitation.  11. Moderately decreased mitral valve posterior leaflet mobility.  12. Right ventricular systolic pressure is within normal limits.     TTE 11/7/23   1. Left ventricular systolic function is severely decreased with a 25-30% estimated ejection fraction.   2. There is global hypokinesis of the left ventricle with minor regional variations.   3. The inferior and infero lateral wall are the most hypokinetic. 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 There is a 1.4 x 1.4 cm echodensity in the submitral apparatus (clip 110). DDx includes calcified papillary muscle (favored) v less likely thrombus.   4. There is moderate, functional mitral valve regurgitation which is eccentrically directed. The degree of mitral regurgitation may be underestimated due to the eccentricity of the jet.   5. There is mildly reduced right ventricular systolic function.   6. The left atrium is severely dilated.   7. RVSP within normal limits.   8. Compared with study from 9/10/2022, there is now at least moderate functional mitral regurgitation, which may be underestimated due to the eccentricity of the  jet. A bright echodensity in the submitral apparatus is seen in some views more easily on the current study, though it also appears in certain clips on the prior study (clip 39) and favored to be a calcified papillary muscle. Comparison of the size of the LV aneurysm is challenging due to technical limitations.     Cath:  [facility] 9/12/22   1. Severe single vessel CAD in a right dominant system.   2. Moderate LM disease similar to prior angiography.   3. Patent RCA stents to level of RPDA. Subintimal stent in RPLB occluded.   4. Patent SVG-LAD.   5. Mildly elevated LVEDP.   6. No aortic stenosis.     [facility] 7/6/2017  CONCLUSIONS:   1. Distal single-vessel coronary artery disease in a right dominant system.   2. Patent SVG-LAD.   3. Severe left ventricular systolic dysfunction with large posterobasal aneurysm.   4. Severe mitral regurgitation.     Past Medical History  ICM/HFrEF (EF 26% 12/2024) s/p ICD (2018), CAD s/p HELEN x4 to RCA c/b VSD s/p CABG x1 (SVG-LAD and VSD repair in 2015), HTN, DLD, Emphysema, CKD3b    Surgical History  Past Surgical History:   Procedure Laterality Date    CATARACT EXTRACTION Bilateral 03/26/2016    Cataract Surgery    CORONARY ARTERY BYPASS GRAFT  02/26/2018    CABG        Social History  Denies current alcohol use. Prior smoker (quit 1998, 20 pack years). Denies illicit drug use. Active and independent with ADLs.     Family History  Family History   Problem Relation Name Age of Onset    Coronary artery disease Mother      Hypertension Father          Allergies  Penicillins and Sulfa (sulfonamide antibiotics)    Current Outpatient Medications   Medication Instructions    aspirin 81 mg EC tablet 1 tablet, Daily    clopidogrel (PLAVIX) 75 mg, oral, Daily    lisinopril 10 mg, oral, Daily, Take first dose 36 hours after your last dose of entresto    multivitamin (MULTIPLE VITAMINS ORAL) 1 tablet, oral, Daily    rosuvastatin (CRESTOR) 20 mg, oral, Daily    spironolactone (ALDACTONE) 12.5 mg, oral,  "Daily    tiotropium (Spiriva Respimat) 2.5 mcg/actuation inhaler 2 puffs, inhalation, Daily RT    torsemide (DEMADEX) 50 mg, oral, Daily         Physical Exam  General: NAD, appears comfortable  HEENT: NCAT, no scleral icterus, EOMI, MMM, no JVD  Heart: regular rhythm, bradycardic, no m/r/g  Lungs: CTAB, no rales/ronchi/wheezing  Abdomen: soft, NT, ND, +BS  Ext: warm, no bl LE edema, sensation intact bl  Neuro: Aox4, NFND,   Psych: mood and affect appropriate    Last Recorded Vitals      1/5/2025     8:30 PM 1/5/2025     8:45 PM 1/5/2025     9:00 PM 1/5/2025     9:15 PM 1/5/2025    10:00 PM 1/5/2025    11:00 PM 1/6/2025    12:00 AM   Vitals   Systolic      120 114   Diastolic      70 66   Heart Rate 63 63 70 70  58 54   Temp     36.2 °C (97.2 °F)     Resp      19 14   Height      1.778 m (5' 10\")    Weight (lb)      146.39    BMI      21 kg/m2    BSA (m2)      1.81 m2       Results for orders placed or performed during the hospital encounter of 01/05/25 (from the past 24 hours)   CBC   Result Value Ref Range    WBC 8.4 4.4 - 11.3 x10*3/uL    nRBC 0.0 0.0 - 0.0 /100 WBCs    RBC 3.16 (L) 4.50 - 5.90 x10*6/uL    Hemoglobin 10.2 (L) 13.5 - 17.5 g/dL    Hematocrit 29.7 (L) 41.0 - 52.0 %    MCV 94 80 - 100 fL    MCH 32.3 26.0 - 34.0 pg    MCHC 34.3 32.0 - 36.0 g/dL    RDW 13.1 11.5 - 14.5 %    Platelets 214 150 - 450 x10*3/uL   Comprehensive Metabolic Panel   Result Value Ref Range    Glucose 97 74 - 99 mg/dL    Sodium 135 (L) 136 - 145 mmol/L    Potassium 4.2 3.5 - 5.3 mmol/L    Chloride 100 98 - 107 mmol/L    Bicarbonate 25 21 - 32 mmol/L    Anion Gap 14 10 - 20 mmol/L    Urea Nitrogen 78 (H) 6 - 23 mg/dL    Creatinine 2.57 (H) 0.50 - 1.30 mg/dL    eGFR 25 (L) >60 mL/min/1.73m*2    Calcium 9.4 8.6 - 10.6 mg/dL    Albumin 4.2 3.4 - 5.0 g/dL    Alkaline Phosphatase 53 33 - 136 U/L    Total Protein 7.9 6.4 - 8.2 g/dL    AST 39 9 - 39 U/L    Bilirubin, Total 0.6 0.0 - 1.2 mg/dL    ALT 18 10 - 52 U/L   Magnesium   Result " Value Ref Range    Magnesium 2.59 (H) 1.60 - 2.40 mg/dL   BLOOD GAS VENOUS FULL PANEL   Result Value Ref Range    POCT pH, Venous 7.40 7.33 - 7.43 pH    POCT pCO2, Venous 41 41 - 51 mm Hg    POCT pO2, Venous 53 (H) 35 - 45 mm Hg    POCT SO2, Venous 84 (H) 45 - 75 %    POCT Oxy Hemoglobin, Venous 82.2 (H) 45.0 - 75.0 %    POCT Hematocrit Calculated, Venous 32.0 (L) 41.0 - 52.0 %    POCT Sodium, Venous 136 136 - 145 mmol/L    POCT Potassium, Venous 4.5 3.5 - 5.3 mmol/L    POCT Chloride, Venous 104 98 - 107 mmol/L    POCT Ionized Calicum, Venous 1.23 1.10 - 1.33 mmol/L    POCT Glucose, Venous 92 74 - 99 mg/dL    POCT Lactate, Venous 0.6 0.4 - 2.0 mmol/L    POCT Base Excess, Venous 0.5 -2.0 - 3.0 mmol/L    POCT HCO3 Calculated, Venous 25.4 22.0 - 26.0 mmol/L    POCT Hemoglobin, Venous 10.8 (L) 13.5 - 17.5 g/dL    POCT Anion Gap, Venous 11.0 10.0 - 25.0 mmol/L    Patient Temperature 37.0 degrees Celsius    FiO2 21 %   Troponin I, High Sensitivity   Result Value Ref Range    Troponin I, High Sensitivity (CMC) 9,896 (HH) 0 - 53 ng/L   Type and Screen   Result Value Ref Range    ABO TYPE B     Rh TYPE POS     ANTIBODY SCREEN NEG    Lidocaine   Result Value Ref Range    Lidocaine  2.2 1.0 - 5.0 ug/mL   Heparin Assay, UFH   Result Value Ref Range    Heparin Unfractionated 0.4 See Comment Below for Therapeutic Ranges IU/mL     *Note: Due to a large number of results and/or encounters for the requested time period, some results have not been displayed. A complete set of results can be found in Results Review.       Assessment/Plan   Wojciech Kern is a 74 y.o. male with PMHx of ICM/HFrEF (EF 26% 12/2024) s/p ICD (2018), CAD s/p HELEN x4 to RCA c/b VSD s/p CABG x1 (SVG-LAD and VSD repair in 2015), HTN, DLD, Emphysema, CKD3b presenting as a transfer from [facility] ED to  CICU with ICD shocks and chest pain. Patient felt ICD fire x2 while shoveling snow and found to be in VT by EMS. Plan for EP consult and ischemic eval.      Neuro:  #Neuropathy  -Not currently on any home meds, consider lidocaine cream     Cardiac:  #VT s/p ICD shock x2  #NSEMI   #ICM/HFrEF (EF 26% 12/2024) s/p ICD (2018)  #CAD s/p HELEN x4 to RCA c/b VSD s/p CABG x1 (SVG-LAD and VSD repair in 2015)  #HTN #DLD  -F/up device interrogation (ordered)  -Consult EP in the AM  -Continue lidocaine gtt @1, f/up lidocaine level   -Maintain K>4, Mag>2  -Consider [facility] for ischemic evaluation   -Trend troponin (89<1035<1733<3507<4620<5744<9896<)  -Continue heparin gtt  -Continue ASA and statin  -Hold plavix (last dose 1/5AM)   -Hold home lisinopril 10mg, spironolactone 12.5mg iso SPENCER   -Hold torsemide 50mg daily, pt appears euvolemic on exam, reassess vol status daily     Pulm:  #Emphysema   -Continue Spiriva inhaler     GI:  -LORELEI    Renal:  #SPENCER on CKD3  ::bl ~Cr 1.5-1.8  -Avoid nephrotoxins and really dose meds  -F/up UA and urine electrolytes     Heme/Onc  #Normocytic Anemia  ::bl Hgb 10-11  -F/up anemia labs    F: PRN  E: K>4, Mag>2   N: cardiac   G: none  A: PIV  DVT: heparin gtt  CODE: FULL (confirmed on admission)   NOK: Imelda (wife) [phone]       Virginia Leal MD  Internal Medicine, PGY3

## 2025-01-06 NOTE — PROGRESS NOTES
01/06/25 1701   Discharge Planning   Living Arrangements Spouse/significant other   Support Systems Spouse/significant other;Children   Assistance Needed n/a   Type of Residence Private residence   Who is requesting discharge planning? Provider   Home or Post Acute Services   (TBD)   Does the patient need discharge transport arranged? No     Social Work Progress Note   - ICU TREATMENT PLAN: Patient presented as a transfer from [facility] ED to  CICU with ICD shocks.  - Payer: [employer].   -Support System: Spouse, son  - Planned Disposition: Pending medical outcome and rehab recommendations.  - Additional Information: SDOH and Social Work Discharge Planning assessments were completed with the patient. There were no SDOH issues identified.  - Barriers to discharge: None at this time. SW will continue to follow.

## 2025-01-07 LAB
ALBUMIN SERPL BCP-MCNC: 3.8 G/DL (ref 3.4–5)
ALBUMIN SERPL BCP-MCNC: 4.1 G/DL (ref 3.4–5)
ALP SERPL-CCNC: 48 U/L (ref 33–136)
ALT SERPL W P-5'-P-CCNC: 17 U/L (ref 10–52)
ANION GAP SERPL CALC-SCNC: 11 MMOL/L (ref 10–20)
ANION GAP SERPL CALC-SCNC: 13 MMOL/L (ref 10–20)
AST SERPL W P-5'-P-CCNC: 27 U/L (ref 9–39)
BILIRUB SERPL-MCNC: 0.6 MG/DL (ref 0–1.2)
BUN SERPL-MCNC: 68 MG/DL (ref 6–23)
BUN SERPL-MCNC: 72 MG/DL (ref 6–23)
CALCIUM SERPL-MCNC: 8.9 MG/DL (ref 8.6–10.6)
CALCIUM SERPL-MCNC: 9.2 MG/DL (ref 8.6–10.6)
CHLORIDE SERPL-SCNC: 103 MMOL/L (ref 98–107)
CHLORIDE SERPL-SCNC: 104 MMOL/L (ref 98–107)
CO2 SERPL-SCNC: 26 MMOL/L (ref 21–32)
CO2 SERPL-SCNC: 26 MMOL/L (ref 21–32)
CREAT SERPL-MCNC: 2.07 MG/DL (ref 0.5–1.3)
CREAT SERPL-MCNC: 2.32 MG/DL (ref 0.5–1.3)
EGFRCR SERPLBLD CKD-EPI 2021: 28 ML/MIN/1.73M*2
EGFRCR SERPLBLD CKD-EPI 2021: 33 ML/MIN/1.73M*2
ERYTHROCYTE [DISTWIDTH] IN BLOOD BY AUTOMATED COUNT: 12.9 % (ref 11.5–14.5)
GLUCOSE SERPL-MCNC: 104 MG/DL (ref 74–99)
GLUCOSE SERPL-MCNC: 125 MG/DL (ref 74–99)
HCT VFR BLD AUTO: 28.6 % (ref 41–52)
HGB BLD-MCNC: 10.1 G/DL (ref 13.5–17.5)
LIDOCAIN SERPL-MCNC: 2.1 UG/ML (ref 1–5)
LIDOCAIN SERPL-MCNC: 8.5 UG/ML (ref 1–5)
MCH RBC QN AUTO: 32 PG (ref 26–34)
MCHC RBC AUTO-ENTMCNC: 35.3 G/DL (ref 32–36)
MCV RBC AUTO: 91 FL (ref 80–100)
NRBC BLD-RTO: 0 /100 WBCS (ref 0–0)
PHOSPHATE SERPL-MCNC: 3.5 MG/DL (ref 2.5–4.9)
PLATELET # BLD AUTO: 197 X10*3/UL (ref 150–450)
POTASSIUM SERPL-SCNC: 4.4 MMOL/L (ref 3.5–5.3)
POTASSIUM SERPL-SCNC: 4.4 MMOL/L (ref 3.5–5.3)
PROT SERPL-MCNC: 7.4 G/DL (ref 6.4–8.2)
RBC # BLD AUTO: 3.16 X10*6/UL (ref 4.5–5.9)
SODIUM SERPL-SCNC: 136 MMOL/L (ref 136–145)
SODIUM SERPL-SCNC: 139 MMOL/L (ref 136–145)
UFH PPP CHRO-ACNC: 0.2 IU/ML
UFH PPP CHRO-ACNC: 0.6 IU/ML
UFH PPP CHRO-ACNC: 1.6 IU/ML
WBC # BLD AUTO: 6.3 X10*3/UL (ref 4.4–11.3)

## 2025-01-07 PROCEDURE — 80053 COMPREHEN METABOLIC PANEL: CPT

## 2025-01-07 PROCEDURE — 36415 COLL VENOUS BLD VENIPUNCTURE: CPT

## 2025-01-07 PROCEDURE — 2500000004 HC RX 250 GENERAL PHARMACY W/ HCPCS (ALT 636 FOR OP/ED)

## 2025-01-07 PROCEDURE — 99291 CRITICAL CARE FIRST HOUR: CPT

## 2025-01-07 PROCEDURE — 80176 ASSAY OF LIDOCAINE: CPT

## 2025-01-07 PROCEDURE — 84100 ASSAY OF PHOSPHORUS: CPT

## 2025-01-07 PROCEDURE — 85520 HEPARIN ASSAY: CPT

## 2025-01-07 PROCEDURE — 2500000001 HC RX 250 WO HCPCS SELF ADMINISTERED DRUGS (ALT 637 FOR MEDICARE OP)

## 2025-01-07 PROCEDURE — 2500000002 HC RX 250 W HCPCS SELF ADMINISTERED DRUGS (ALT 637 FOR MEDICARE OP, ALT 636 FOR OP/ED)

## 2025-01-07 PROCEDURE — 85027 COMPLETE CBC AUTOMATED: CPT

## 2025-01-07 PROCEDURE — 1100000001 HC PRIVATE ROOM DAILY

## 2025-01-07 RX ADMIN — ROSUVASTATIN CALCIUM 20 MG: 20 TABLET, FILM COATED ORAL at 09:10

## 2025-01-07 RX ADMIN — SODIUM CHLORIDE, POTASSIUM CHLORIDE, SODIUM LACTATE AND CALCIUM CHLORIDE 500 ML: 600; 310; 30; 20 INJECTION, SOLUTION INTRAVENOUS at 11:23

## 2025-01-07 RX ADMIN — ASPIRIN 81 MG: 81 TABLET, COATED ORAL at 09:10

## 2025-01-07 RX ADMIN — HEPARIN SODIUM 1200 UNITS/HR: 10000 INJECTION, SOLUTION INTRAVENOUS at 21:00

## 2025-01-07 ASSESSMENT — PAIN - FUNCTIONAL ASSESSMENT
PAIN_FUNCTIONAL_ASSESSMENT: 0-10

## 2025-01-07 ASSESSMENT — PAIN SCALES - GENERAL
PAINLEVEL_OUTOF10: 0 - NO PAIN

## 2025-01-07 NOTE — PROGRESS NOTES
"Wojciech Kern is a 74 y.o. male on day 2 of admission presenting with VT (ventricular tachycardia) (Multi).    Subjective   NAEON. NPO for [facility] today, however renal function still above baseline so will cancel for today.     Patient feels well today, no chest pain, no dyspnea.    Objective     General: Awake, alert, in no acute distress, sitting up in bed  HEENT: Normocephalic, atraumatic  CV: Heart with regular rate and rhythm, no murmurs appreciated, no JVD neck veins appear flat  Lungs: Clear to auscultation bilaterally with no wheezes, crackles, or rhonchi  GI: Soft, nontender, nondistended  Extremities: 2+ pulses bilaterally, no lower extremity edema  Neuro: Moves all extremities equally, strength 5/5 bilaterally      Last Recorded Vitals  Blood pressure 106/65, pulse 61, temperature 36.3 °C (97.3 °F), temperature source Temporal, resp. rate 13, height 1.778 m (5' 10\"), weight 68.2 kg (150 lb 5.7 oz), SpO2 98%.  Intake/Output last 3 Shifts:  I/O last 3 completed shifts:  In: 1413 (20.7 mL/kg) [P.O.:440; I.V.:473 (6.9 mL/kg); IV Piggyback:500]  Out: 1550 (22.7 mL/kg) [Urine:1550 (0.6 mL/kg/hr)]  Weight: 68.2 kg     Relevant Results  Scheduled medications  aspirin, 81 mg, oral, Daily  [Held by provider] clopidogrel, 75 mg, oral, Daily  [Held by provider] lisinopril, 10 mg, oral, Daily  rosuvastatin, 20 mg, oral, Daily  [Held by provider] spironolactone, 12.5 mg, oral, Daily  tiotropium, 2 puff, inhalation, Daily  [Held by provider] torsemide, 50 mg, oral, Daily      Continuous medications  heparin, 0-4,000 Units/hr, Last Rate: 1,000 Units/hr (01/07/25 0600)  lidocaine, 1 mg/min, Last Rate: 1 mg/min (01/07/25 0600)      PRN medications  PRN medications: heparin  Results for orders placed or performed during the hospital encounter of 01/05/25 (from the past 24 hours)   Transthoracic Echo (TTE) Complete   Result Value Ref Range    AV pk agustina 1.26 m/s    AV mn grad 3 mmHg    LVOT diam 2.00 cm    MV E/A ratio 0.55     " LA vol index A/L 76.5 ml/m2    Tricuspid annular plane systolic excursion 1.5 cm    LV EF 28 %    RV free wall pk S' 5.33 cm/s    LVIDd 5.47 cm    Aortic Valve Area by Continuity of VTI 2.08 cm2    Aortic Valve Area by Continuity of Peak Velocity 2.54 cm2    AV pk grad 6 mmHg    LV A4C EF 35.3    Renal function panel   Result Value Ref Range    Glucose 96 74 - 99 mg/dL    Sodium 136 136 - 145 mmol/L    Potassium 4.4 3.5 - 5.3 mmol/L    Chloride 100 98 - 107 mmol/L    Bicarbonate 27 21 - 32 mmol/L    Anion Gap 13 10 - 20 mmol/L    Urea Nitrogen 78 (H) 6 - 23 mg/dL    Creatinine 2.80 (H) 0.50 - 1.30 mg/dL    eGFR 23 (L) >60 mL/min/1.73m*2    Calcium 9.1 8.6 - 10.6 mg/dL    Phosphorus 4.2 2.5 - 4.9 mg/dL    Albumin 4.1 3.4 - 5.0 g/dL   Lidocaine   Result Value Ref Range    Lidocaine  2.0 1.0 - 5.0 ug/mL   CBC   Result Value Ref Range    WBC 6.3 4.4 - 11.3 x10*3/uL    nRBC 0.0 0.0 - 0.0 /100 WBCs    RBC 3.16 (L) 4.50 - 5.90 x10*6/uL    Hemoglobin 10.1 (L) 13.5 - 17.5 g/dL    Hematocrit 28.6 (L) 41.0 - 52.0 %    MCV 91 80 - 100 fL    MCH 32.0 26.0 - 34.0 pg    MCHC 35.3 32.0 - 36.0 g/dL    RDW 12.9 11.5 - 14.5 %    Platelets 197 150 - 450 x10*3/uL   Lidocaine   Result Value Ref Range    Lidocaine  8.5 (HH) 1.0 - 5.0 ug/mL   Comprehensive metabolic panel   Result Value Ref Range    Glucose 104 (H) 74 - 99 mg/dL    Sodium 139 136 - 145 mmol/L    Potassium 4.4 3.5 - 5.3 mmol/L    Chloride 104 98 - 107 mmol/L    Bicarbonate 26 21 - 32 mmol/L    Anion Gap 13 10 - 20 mmol/L    Urea Nitrogen 72 (H) 6 - 23 mg/dL    Creatinine 2.32 (H) 0.50 - 1.30 mg/dL    eGFR 28 (L) >60 mL/min/1.73m*2    Calcium 8.9 8.6 - 10.6 mg/dL    Albumin 3.8 3.4 - 5.0 g/dL    Alkaline Phosphatase 48 33 - 136 U/L    Total Protein 7.4 6.4 - 8.2 g/dL    AST 27 9 - 39 U/L    Bilirubin, Total 0.6 0.0 - 1.2 mg/dL    ALT 17 10 - 52 U/L   Heparin Assay, UFH   Result Value Ref Range    Heparin Unfractionated 0.2 See Comment Below for Therapeutic Ranges IU/mL    Lidocaine   Result Value Ref Range    Lidocaine  2.1 1.0 - 5.0 ug/mL     *Note: Due to a large number of results and/or encounters for the requested time period, some results have not been displayed. A complete set of results can be found in Results Review.   XR chest 1 view    Result Date: 1/5/2025  STUDY: Chest Radiograph;  01/05/2025 1:03 PM INDICATION: Chest pain. COMPARISON: 02/18/2024 XR Chest. 08/20/2023 XR Chest. ACCESSION NUMBER(S): [id number] ORDERING CLINICIAN: STEFFEN SIMERLINK TECHNIQUE:  Frontal chest was obtained at 13:03 hours. FINDINGS: The patient status post median sternotomy.  There is a defibrillator present. CARDIOMEDIASTINAL SILHOUETTE: Cardiomediastinal silhouette is enlarged..  LUNGS: The previous findings suggestive of pulmonary edema have decreased. There is still partial silhouetting of the left hemidiaphragm most likely representing atelectasis.  ABDOMEN: No remarkable upper abdominal findings.  BONES: No acute osseous changes.    Probable left basilar atelectasis. Signed by Rich Kinsey MD    Transthoracic echo (TTE) complete    Result Date: 12/11/2024   [facility], [address] [place] [address]              Tel [phone] and Fax [phone] TRANSTHORACIC ECHOCARDIOGRAM REPORT  Patient Name:       DANICA Mccall Physician:    [id number] Zackery Galdamez DO Study Date:         12/10/2024          Ordering Provider:    [id number] JAMIE BAGLEY MRN/PID:            [id number]            Fellow: Accession#:         [id number]        Nurse: Date of Birth/Age:  1950 / 74 years Sonographer:          Aryan Smiley                                                               SWAPNIL Gender assigned at  M                   Additional Staff: Birth: Height:             175.00 cm           Admit Date:           12/10/2024 Weight:             70.00 kg            Admission Status:     Outpatient BSA / BMI:           1.85 m2 / 22.86     Encounter#:           [id number]                     kg/m2 Blood Pressure:     130/80 mmHg         Department Location:  [facility] Non                                                               Invasive Study Type:    TRANSTHORACIC ECHO (TTE) COMPLETE Diagnosis/ICD: Atherosclerotic heart disease of native coronary artery without                angina pectoris-I25.10 Indication:    CAD CPT Code:      Echo Complete w Full Doppler-74468 Patient History: Valve Disorders:   Mitral Regurgitation. Pacer/Defib:       AICD Pertinent History: CAD. NSTEMI, HFrEF, LV aneurysm. Study Detail: The following Echo studies were performed: 2D, M-Mode, Doppler and               color flow. Technically challenging study due to body habitus and               poor acoustic windows. Definity used as a contrast agent for               endocardial border definition. Total contrast used for this               procedure was 3 mL via IV push.  PHYSICIAN INTERPRETATION: Left Ventricle: Left ventricular ejection fraction is severely decreased, calculated by Mckeon's biplane at 26%. There is severely increased eccentric left ventricular hypertrophy. There is global hypokinesis of the left ventricle with minor regional variations. The left ventricular cavity size is moderate to severely dilated. There is normal septal and normal posterior left ventricular wall thickness. Spectral Doppler shows an abnormal pattern of left ventricular diastolic filling. There is no definite left ventricular thrombus visualized. 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 Left Atrium: The left atrium is severely dilated. Right Ventricle: The right ventricle is normal in size. There is normal right ventricular global systolic function. Right Atrium: The right atrium is normal in size. Aortic Valve: The aortic valve is trileaflet. The aortic valve dimensionless index is 0.65. There is no  evidence of aortic valve regurgitation. The peak instantaneous gradient of the aortic valve is 5 mmHg. The mean gradient of the aortic valve is 3 mmHg. Mitral Valve: The mitral valve is mild to moderately thickened. There is moderately decreased mitral valve posterior leaflet mobility. The peak instantaneous gradient of the mitral valve is 5 mmHg. There is mild to moderate mitral valve regurgitation which is eccentrically directed. The mitral regurgitant orifice area is 8 mm2. The mitral regurgitant volume is 14.09 ml. Tricuspid Valve: The tricuspid valve is structurally normal. There is trace to mild tricuspid regurgitation. The Doppler estimated RVSP is within normal limits at 17.5 mmHg. Pulmonic Valve: The pulmonic valve is structurally normal. There is physiologic pulmonic valve regurgitation. Pericardium: There is no pericardial effusion noted. Aorta: The aortic root is normal. There is mild dilatation of the ascending aorta. There is no dilatation of the aortic root. In comparison to the previous echocardiogram(s): Compared with study dated 11/7/2023, no significant change.  CONCLUSIONS:  1. Left ventricular ejection fraction is severely decreased, calculated by Mckeon's biplane at 26%.  2. There is global hypokinesis of the left ventricle with minor regional variations.  3. Spectral Doppler shows an abnormal pattern of left ventricular diastolic filling.  4. Left ventricular cavity size is moderate to severely dilated.  5. 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  6. No left ventricular thrombus visualized.  7. There is severely increased eccentric left ventricular hypertrophy.  8. There is normal right ventricular global systolic function.  9. The left atrium is severely dilated. 10. Mild to moderate mitral valve regurgitation. 11. Moderately decreased mitral valve posterior leaflet mobility. 12. Right ventricular systolic pressure is within normal limits.  QUANTITATIVE DATA SUMMARY:  2D MEASUREMENTS:           Normal Ranges: IVSd:            1.02 cm   (0.6-1.1cm) LVPWd:           0.64 cm   (0.6-1.1cm) LVIDd:           10.18 cm  (3.9-5.9cm) LVIDs:           8.31 cm LV Mass Index:   385 g/m2 LVEDV Index:     122 ml/m2 LV % FS          18.3 %  LA VOLUME:                    Normal Ranges: LA Vol A4C:        79.6 ml    (22+/-6mL/m2) LA Vol A2C:        93.8 ml LA Vol BP:         89.4 ml LA Vol Index A4C:  43.1ml/m2 LA Vol Index A2C:  50.8 ml/m2 LA Vol Index BP:   48.3 ml/m2 LA Area A4C:       24.1 cm2 LA Area A2C:       25.3 cm2 LA Major Axis A4C: 6.2 cm LA Major Axis A2C: 5.8 cm LA Volume Index:   48.3 ml/m2 LA Vol A4C:        75.4 ml LA Vol A2C:        87.0 ml LA Vol Index BSA:  43.9 ml/m2  RA VOLUME BY A/L METHOD:          Normal Ranges: RA Area A4C:             15.9 cm2  AORTA MEASUREMENTS:         Normal Ranges: Ao Sinus, d:        3.10 cm (2.1-3.5cm) Ao STJ, d:          3.30 cm (1.7-3.4cm) Asc Ao, d:          3.60 cm (2.1-3.4cm)  LV SYSTOLIC FUNCTION BY 2D PLANIMETRY (MOD):                      Normal Ranges: EF-A4C View:    37 % (>=55%) EF-A2C View:    24 % EF-Biplane:     26 % LV EF Reported: 26 %  LV DIASTOLIC FUNCTION:             Normal Ranges: MV Peak E:             0.64 m/s    (0.7-1.2 m/s) MV Peak A:             0.94 m/s    (0.42-0.7 m/s) E/A Ratio:             0.68        (1.0-2.2) MV e'                  0.055 m/s   (>8.0) MV lateral e'          0.08 m/s MV medial e'           0.03 m/s E/e' Ratio:            11.59       (<8.0) PulmV Sys Cricket:         44.36 cm/s PulmV Kay Cricket:        14.21 cm/s PulmV S/D Cricket:         3.12 PulmV A Revs Cricket:      25.02 cm/s PulmV A Revs Dur:      106.11 msec  MITRAL VALVE:          Normal Ranges: MV Vmax:      1.15 m/s (<=1.3m/s) MV peak P.2 mmHg (<5mmHg) MV mean P.8 mmHg (<2mmHg) MV VTI:       30.25 cm (10-13cm) MV DT:        275 msec (150-240msec)  MITRAL INSUFFICIENCY:             Normal Ranges: MR VTI:                176.26 cm MR Vmax:              484.55 cm/s MR Volume:            14.09 ml MR Flow Rt:           38.72 ml/s MR EROA:              8 mm2  AORTIC VALVE:                     Normal Ranges: AoV Vmax:                1.15 m/s (<=1.7m/s) AoV Peak P.3 mmHg (<20mmHg) AoV Mean PG:             3.5 mmHg (1.7-11.5mmHg) LVOT Max Cricket:            0.78 m/s (<=1.1m/s) AoV VTI:                 26.88 cm (18-25cm) LVOT VTI:                17.44 cm LVOT Diameter:           2.27 cm  (1.8-2.4cm) AoV Area, VTI:           2.62 cm2 (2.5-5.5cm2) AoV Area,Vmax:           2.74 cm2 (2.5-4.5cm2) AoV Dimensionless Index: 0.65  RIGHT VENTRICLE: RV Basal 3.40 cm RV Mid   2.00 cm RV Major 7.5 cm TAPSE:   14.0 mm  TRICUSPID VALVE/RVSP:          Normal Ranges: Peak TR Velocity:     1.90 m/s Est. RA Pressure:     3 mmHg RV Syst Pressure:     17 mmHg  (< 30mmHg) IVC Diam:             1.60 cm  PULMONIC VALVE:          Normal Ranges: PV Accel Time:  129 msec (>120ms) PV Max Cricket:     1.0 m/s  (0.6-0.9m/s) PV Max P.4 mmHg  Pulmonary Veins: PulmV A Revs Dur: 106.11 msec PulmV A Revs Cricket: 25.02 cm/s PulmV Kay Cricket:   14.21 cm/s PulmV S/D Cricket:    3.12 PulmV Sys Cricket:    44.36 cm/s  [id number] Zackery Galdamez DO Electronically signed on 2024 at 7:15:10 PM  ** Final **               Assessment/Plan   Assessment & Plan  VT (ventricular tachycardia) (Multi)    NSTEMI (non-ST elevated myocardial infarction) (Multi)    Wojciech Kern is a 74 y.o. male with PMHx of ICM/HFrEF (EF 26% 2024) s/p ICD (2018), CAD s/p HELEN x4 to RCA c/b VSD s/p CABG x1 (SVG-LAD and VSD repair in 2015), HTN, DLD, Emphysema, CKD3b presenting as a transfer from [facility] ED to  CICU with ICD shocks and chest pain. Patient felt ICD fire x2 while shoveling snow and found to be in VT by EMS. Continue on lidocaine drip for possible VF per EP. Plan for ischemic eval.     Today:   -Renal function improving but still elevated (Cr 2.32 from 2.74 on admission), baseline  ~1.7  -Euvolemic/dry on exam, give additional 500 ml bolus today  -Plan for LHC tomorrow 1/8, recheck PM RFP to monitor Cr improvement     Neuro:  #Neuropathy  -Not currently on any home meds, consider lidocaine cream      Cardiac:  #VT s/p ICD shock x2  #NSEMI   #ICM/HFrEF (EF 26% 12/2024) s/p ICD (2018)  #CAD s/p HELEN x4 to RCA c/b VSD s/p CABG x1 (SVG-LAD and VSD repair in 2015)  #HTN #DLD  -Device interrogation: Showed wide complex tachycardia, with evidence of Ventricular Fibrillation  - Trop peak 9896  -EP consulted   -Continue lidocaine gtt @1  -Maintain K>4, Mag>2  -Continue heparin gtt  -Continue ASA and statin  -Hold plavix (last dose 1/5AM)   -Hold home lisinopril 10mg, spironolactone 12.5mg iso SPENCER; patient was not on entresto or empagliflozin 2/2 cost  - coreg previously discontinued 2/2 bradycardia   -Hold torsemide 50mg daily, pt appears euvolemic on exam, reassess vol status daily   -LHC for ischemic email planned for tomorrow pending continued renal improvement     Pulm:  #Emphysema   -Continue Spiriva inhaler      GI:  -LORELEI     Renal:  #SPENCER on CKD3  ::bl ~Cr 1.5-1.8  -Cr improved today 2.8 -> 2.32, s/p 500cc bolus  -Avoid nephrotoxins and really dose meds  -Give additional 500 ml bolus today  - trend RFP     Heme/Onc  #Normocytic Anemia  ::bl Hgb 10-11  -F/up anemia labs     F: PRN  E: K>4, Mag>2   N: cardiac; NPO until C  G: none  A: PIV  DVT: heparin gtt  Gtt: Heparin, lidocaine  CODE: FULL (confirmed on admission)   NOK: Imelda (wife) [phone]          Patient was seen and discussed with attending Dr. Tony Smith MD  Internal Medicine and Pediatrics, PGY3

## 2025-01-07 NOTE — HOSPITAL COURSE
"Wojciech Kern is a 74 y.o. male with PMHx of ICM/HFrEF (EF 26% 12/2024) s/p ICD (2018), CAD s/p HELEN x4 to RCA c/b VSD s/p CABG x1 (SVG-LAD and VSD repair in 2015), HTN, DLD, Emphysema, CKD3b presenting as a transfer from [facility] ED to  CICU with ICD shocks.     He states he was out shoveling snow earlier this morning (1/5/24) and started feeling unwell. He was feeling short of breath and chest soreness 3/10, nonradiating, in the center of his chest. He then went inside his house and sat down to rest when he felt his ICD fire twice. He took a sl nitroglycerin and his chest pain resolved shortly after. He has never felt his ICD fire before. He states he usually walks daily and goes to the gym and has never felt chest soreness/sob. States he has been complaint with his home meds. He called 911 and EMS noted patient was still in Vtach. He received amiodarone en route which converted him to sinus rhythm.     Upon arrival to [facility] CICU, pt denies chest pain, palpitations, sob. He was admitted for EP evaluation and ischemic evaluation. On admission he had an SPENCER, likely I/s/o ischemia which was improving after gradual fluid administration (500 ml/day administered slowly). HR was controlled on lidocaine drip and EP saw the patient, device interrogation notable for \" wide complex regular tachycardia that starts at a rate of approx 150bpm, but accelerates later with a change in morphology concerning for VF\". EP recommended beta blocker upon discharge. Post-LHC, patient had frequent PVCs, amiodarone was started.    LHC via R femoral access on 1/8 showed no significant coronary artery disease in native LAD; calcific proximal circumflex lesion, IVUS assisted PCI of the Prox Lcx with HELEN. RCA without significant CAD. Patient had concern for hematoma of R groin post removal of arterial sheath; on post procedure day 0, on day of discharge there was no visible or palpable hematoma.  "

## 2025-01-08 ENCOUNTER — APPOINTMENT (OUTPATIENT)
Dept: CARDIOLOGY | Facility: HOSPITAL | Age: 75
End: 2025-01-08
Payer: MEDICARE

## 2025-01-08 LAB
ABO GROUP (TYPE) IN BLOOD: NORMAL
ACT BLD: 161 SEC (ref 83–199)
ACT BLD: 211 SEC (ref 83–199)
ACT BLD: 281 SEC (ref 83–199)
ACT BLD: NORMAL S
ACT BLD: NORMAL S
ALBUMIN SERPL BCP-MCNC: 3.6 G/DL (ref 3.4–5)
ANION GAP SERPL CALC-SCNC: 12 MMOL/L (ref 10–20)
ANTIBODY SCREEN: NORMAL
ATRIAL RATE: 49 BPM
BASOPHILS # BLD AUTO: 0.04 X10*3/UL (ref 0–0.1)
BASOPHILS # BLD AUTO: 0.05 X10*3/UL (ref 0–0.1)
BASOPHILS NFR BLD AUTO: 0.7 %
BASOPHILS NFR BLD AUTO: 0.8 %
BUN SERPL-MCNC: 57 MG/DL (ref 6–23)
CALCIUM SERPL-MCNC: 9 MG/DL (ref 8.6–10.6)
CHLORIDE SERPL-SCNC: 105 MMOL/L (ref 98–107)
CHOLEST SERPL-MCNC: 101 MG/DL (ref 0–199)
CHOLESTEROL/HDL RATIO: 2
CO2 SERPL-SCNC: 27 MMOL/L (ref 21–32)
CREAT SERPL-MCNC: 1.92 MG/DL (ref 0.5–1.3)
EGFRCR SERPLBLD CKD-EPI 2021: 36 ML/MIN/1.73M*2
EOSINOPHIL # BLD AUTO: 0.09 X10*3/UL (ref 0–0.4)
EOSINOPHIL # BLD AUTO: 0.18 X10*3/UL (ref 0–0.4)
EOSINOPHIL NFR BLD AUTO: 1.6 %
EOSINOPHIL NFR BLD AUTO: 3 %
ERYTHROCYTE [DISTWIDTH] IN BLOOD BY AUTOMATED COUNT: 13 % (ref 11.5–14.5)
ERYTHROCYTE [DISTWIDTH] IN BLOOD BY AUTOMATED COUNT: 13.2 % (ref 11.5–14.5)
GLUCOSE SERPL-MCNC: 106 MG/DL (ref 74–99)
HCT VFR BLD AUTO: 27.7 % (ref 41–52)
HCT VFR BLD AUTO: 29.5 % (ref 41–52)
HDLC SERPL-MCNC: 49.9 MG/DL
HGB BLD-MCNC: 10.2 G/DL (ref 13.5–17.5)
HGB BLD-MCNC: 9.7 G/DL (ref 13.5–17.5)
IMM GRANULOCYTES # BLD AUTO: 0.02 X10*3/UL (ref 0–0.5)
IMM GRANULOCYTES # BLD AUTO: 0.02 X10*3/UL (ref 0–0.5)
IMM GRANULOCYTES NFR BLD AUTO: 0.3 % (ref 0–0.9)
IMM GRANULOCYTES NFR BLD AUTO: 0.3 % (ref 0–0.9)
LDLC SERPL CALC-MCNC: 45 MG/DL
LIDOCAIN SERPL-MCNC: 1 UG/ML (ref 1–5)
LIDOCAIN SERPL-MCNC: 12.3 UG/ML (ref 1–5)
LIDOCAIN SERPL-MCNC: >36 UG/ML (ref 1–5)
LYMPHOCYTES # BLD AUTO: 1.51 X10*3/UL (ref 0.8–3)
LYMPHOCYTES # BLD AUTO: 1.94 X10*3/UL (ref 0.8–3)
LYMPHOCYTES NFR BLD AUTO: 26 %
LYMPHOCYTES NFR BLD AUTO: 32 %
MAGNESIUM SERPL-MCNC: 2.33 MG/DL (ref 1.6–2.4)
MCH RBC QN AUTO: 31.9 PG (ref 26–34)
MCH RBC QN AUTO: 32 PG (ref 26–34)
MCHC RBC AUTO-ENTMCNC: 34.6 G/DL (ref 32–36)
MCHC RBC AUTO-ENTMCNC: 35 G/DL (ref 32–36)
MCV RBC AUTO: 91 FL (ref 80–100)
MCV RBC AUTO: 93 FL (ref 80–100)
MONOCYTES # BLD AUTO: 0.58 X10*3/UL (ref 0.05–0.8)
MONOCYTES # BLD AUTO: 0.58 X10*3/UL (ref 0.05–0.8)
MONOCYTES NFR BLD AUTO: 10 %
MONOCYTES NFR BLD AUTO: 9.6 %
NEUTROPHILS # BLD AUTO: 3.29 X10*3/UL (ref 1.6–5.5)
NEUTROPHILS # BLD AUTO: 3.56 X10*3/UL (ref 1.6–5.5)
NEUTROPHILS NFR BLD AUTO: 54.3 %
NEUTROPHILS NFR BLD AUTO: 61.4 %
NON HDL CHOLESTEROL: 51 MG/DL (ref 0–149)
NRBC BLD-RTO: 0 /100 WBCS (ref 0–0)
NRBC BLD-RTO: 0 /100 WBCS (ref 0–0)
P AXIS: 25 DEGREES
P OFFSET: 182 MS
P ONSET: 117 MS
PHOSPHATE SERPL-MCNC: 3.3 MG/DL (ref 2.5–4.9)
PLATELET # BLD AUTO: 191 X10*3/UL (ref 150–450)
PLATELET # BLD AUTO: 216 X10*3/UL (ref 150–450)
POTASSIUM SERPL-SCNC: 4.5 MMOL/L (ref 3.5–5.3)
PR INTERVAL: 200 MS
Q ONSET: 217 MS
QRS COUNT: 8 BEATS
QRS DURATION: 116 MS
QT INTERVAL: 472 MS
QTC CALCULATION(BAZETT): 426 MS
QTC FREDERICIA: 441 MS
R AXIS: -26 DEGREES
RBC # BLD AUTO: 3.04 X10*6/UL (ref 4.5–5.9)
RBC # BLD AUTO: 3.19 X10*6/UL (ref 4.5–5.9)
RH FACTOR (ANTIGEN D): NORMAL
SODIUM SERPL-SCNC: 139 MMOL/L (ref 136–145)
T AXIS: 261 DEGREES
T OFFSET: 453 MS
TRIGL SERPL-MCNC: 33 MG/DL (ref 0–149)
UFH PPP CHRO-ACNC: 0.6 IU/ML
VENTRICULAR RATE: 49 BPM
VLDL: 7 MG/DL (ref 0–40)
WBC # BLD AUTO: 5.8 X10*3/UL (ref 4.4–11.3)
WBC # BLD AUTO: 6.1 X10*3/UL (ref 4.4–11.3)

## 2025-01-08 PROCEDURE — C1874 STENT, COATED/COV W/DEL SYS: HCPCS | Performed by: INTERNAL MEDICINE

## 2025-01-08 PROCEDURE — 027034Z DILATION OF CORONARY ARTERY, ONE ARTERY WITH DRUG-ELUTING INTRALUMINAL DEVICE, PERCUTANEOUS APPROACH: ICD-10-PCS | Performed by: INTERNAL MEDICINE

## 2025-01-08 PROCEDURE — 85347 COAGULATION TIME ACTIVATED: CPT

## 2025-01-08 PROCEDURE — C9600 PERC DRUG-EL COR STENT SING: HCPCS | Mod: LC | Performed by: INTERNAL MEDICINE

## 2025-01-08 PROCEDURE — 99291 CRITICAL CARE FIRST HOUR: CPT

## 2025-01-08 PROCEDURE — 93005 ELECTROCARDIOGRAM TRACING: CPT

## 2025-01-08 PROCEDURE — 2500000004 HC RX 250 GENERAL PHARMACY W/ HCPCS (ALT 636 FOR OP/ED)

## 2025-01-08 PROCEDURE — 2550000001 HC RX 255 CONTRASTS: Performed by: INTERNAL MEDICINE

## 2025-01-08 PROCEDURE — B2111ZZ FLUOROSCOPY OF MULTIPLE CORONARY ARTERIES USING LOW OSMOLAR CONTRAST: ICD-10-PCS | Performed by: INTERNAL MEDICINE

## 2025-01-08 PROCEDURE — 99152 MOD SED SAME PHYS/QHP 5/>YRS: CPT | Performed by: INTERNAL MEDICINE

## 2025-01-08 PROCEDURE — C1887 CATHETER, GUIDING: HCPCS | Performed by: INTERNAL MEDICINE

## 2025-01-08 PROCEDURE — 85347 COAGULATION TIME ACTIVATED: CPT | Performed by: INTERNAL MEDICINE

## 2025-01-08 PROCEDURE — 85025 COMPLETE CBC W/AUTO DIFF WBC: CPT

## 2025-01-08 PROCEDURE — C1753 CATH, INTRAVAS ULTRASOUND: HCPCS | Performed by: INTERNAL MEDICINE

## 2025-01-08 PROCEDURE — 2500000001 HC RX 250 WO HCPCS SELF ADMINISTERED DRUGS (ALT 637 FOR MEDICARE OP): Performed by: INTERNAL MEDICINE

## 2025-01-08 PROCEDURE — 36415 COLL VENOUS BLD VENIPUNCTURE: CPT

## 2025-01-08 PROCEDURE — 85520 HEPARIN ASSAY: CPT

## 2025-01-08 PROCEDURE — 80069 RENAL FUNCTION PANEL: CPT

## 2025-01-08 PROCEDURE — 2720000007 HC OR 272 NO HCPCS: Performed by: INTERNAL MEDICINE

## 2025-01-08 PROCEDURE — 92978 ENDOLUMINL IVUS OCT C 1ST: CPT | Performed by: INTERNAL MEDICINE

## 2025-01-08 PROCEDURE — 76937 US GUIDE VASCULAR ACCESS: CPT | Performed by: INTERNAL MEDICINE

## 2025-01-08 PROCEDURE — C1725 CATH, TRANSLUMIN NON-LASER: HCPCS | Performed by: INTERNAL MEDICINE

## 2025-01-08 PROCEDURE — 2500000004 HC RX 250 GENERAL PHARMACY W/ HCPCS (ALT 636 FOR OP/ED): Performed by: INTERNAL MEDICINE

## 2025-01-08 PROCEDURE — 80176 ASSAY OF LIDOCAINE: CPT

## 2025-01-08 PROCEDURE — 99153 MOD SED SAME PHYS/QHP EA: CPT | Performed by: INTERNAL MEDICINE

## 2025-01-08 PROCEDURE — C1769 GUIDE WIRE: HCPCS | Performed by: INTERNAL MEDICINE

## 2025-01-08 PROCEDURE — 2500000002 HC RX 250 W HCPCS SELF ADMINISTERED DRUGS (ALT 637 FOR MEDICARE OP, ALT 636 FOR OP/ED)

## 2025-01-08 PROCEDURE — C1894 INTRO/SHEATH, NON-LASER: HCPCS | Performed by: INTERNAL MEDICINE

## 2025-01-08 PROCEDURE — 83735 ASSAY OF MAGNESIUM: CPT

## 2025-01-08 PROCEDURE — 80061 LIPID PANEL: CPT

## 2025-01-08 PROCEDURE — 2500000001 HC RX 250 WO HCPCS SELF ADMINISTERED DRUGS (ALT 637 FOR MEDICARE OP)

## 2025-01-08 PROCEDURE — 86901 BLOOD TYPING SEROLOGIC RH(D): CPT

## 2025-01-08 PROCEDURE — 2020000001 HC ICU ROOM DAILY

## 2025-01-08 PROCEDURE — 2780000003 HC OR 278 NO HCPCS: Performed by: INTERNAL MEDICINE

## 2025-01-08 PROCEDURE — 4A023N7 MEASUREMENT OF CARDIAC SAMPLING AND PRESSURE, LEFT HEART, PERCUTANEOUS APPROACH: ICD-10-PCS | Performed by: INTERNAL MEDICINE

## 2025-01-08 PROCEDURE — 99232 SBSQ HOSP IP/OBS MODERATE 35: CPT | Performed by: INTERNAL MEDICINE

## 2025-01-08 PROCEDURE — 92928 PRQ TCAT PLMT NTRAC ST 1 LES: CPT | Performed by: INTERNAL MEDICINE

## 2025-01-08 PROCEDURE — 93010 ELECTROCARDIOGRAM REPORT: CPT | Performed by: INTERNAL MEDICINE

## 2025-01-08 PROCEDURE — 92978 ENDOLUMINL IVUS OCT C 1ST: CPT | Mod: LC | Performed by: INTERNAL MEDICINE

## 2025-01-08 DEVICE — STENT ONYXNG35018UX ONYX 3.50X18RX
Type: IMPLANTABLE DEVICE | Site: HEART | Status: FUNCTIONAL
Brand: ONYX FRONTIER™

## 2025-01-08 RX ORDER — FENTANYL CITRATE 50 UG/ML
25 INJECTION, SOLUTION INTRAMUSCULAR; INTRAVENOUS ONCE
Status: COMPLETED | OUTPATIENT
Start: 2025-01-08 | End: 2025-01-08

## 2025-01-08 RX ORDER — LIDOCAINE HYDROCHLORIDE 10 MG/ML
INJECTION, SOLUTION EPIDURAL; INFILTRATION; INTRACAUDAL; PERINEURAL AS NEEDED
Status: DISCONTINUED | OUTPATIENT
Start: 2025-01-08 | End: 2025-01-08 | Stop reason: HOSPADM

## 2025-01-08 RX ORDER — HEPARIN SODIUM 1000 [USP'U]/ML
INJECTION, SOLUTION INTRAVENOUS; SUBCUTANEOUS AS NEEDED
Status: DISCONTINUED | OUTPATIENT
Start: 2025-01-08 | End: 2025-01-08 | Stop reason: HOSPADM

## 2025-01-08 RX ORDER — MIDAZOLAM HYDROCHLORIDE 1 MG/ML
INJECTION, SOLUTION INTRAMUSCULAR; INTRAVENOUS AS NEEDED
Status: DISCONTINUED | OUTPATIENT
Start: 2025-01-08 | End: 2025-01-08 | Stop reason: HOSPADM

## 2025-01-08 RX ORDER — FENTANYL CITRATE 50 UG/ML
INJECTION, SOLUTION INTRAMUSCULAR; INTRAVENOUS AS NEEDED
Status: DISCONTINUED | OUTPATIENT
Start: 2025-01-08 | End: 2025-01-08 | Stop reason: HOSPADM

## 2025-01-08 RX ORDER — CLOPIDOGREL BISULFATE 75 MG/1
75 TABLET ORAL DAILY
Status: DISCONTINUED | OUTPATIENT
Start: 2025-01-09 | End: 2025-01-09 | Stop reason: HOSPADM

## 2025-01-08 RX ORDER — LIDOCAINE HYDROCHLORIDE ANHYDROUS AND DEXTROSE MONOHYDRATE .8; 5 G/100ML; G/100ML
1 INJECTION, SOLUTION INTRAVENOUS CONTINUOUS
Status: DISCONTINUED | OUTPATIENT
Start: 2025-01-08 | End: 2025-01-09

## 2025-01-08 RX ORDER — SODIUM CHLORIDE, SODIUM LACTATE, POTASSIUM CHLORIDE, CALCIUM CHLORIDE 600; 310; 30; 20 MG/100ML; MG/100ML; MG/100ML; MG/100ML
INJECTION, SOLUTION INTRAVENOUS CONTINUOUS PRN
Status: COMPLETED | OUTPATIENT
Start: 2025-01-08 | End: 2025-01-08

## 2025-01-08 RX ORDER — FENTANYL CITRATE 50 UG/ML
INJECTION, SOLUTION INTRAMUSCULAR; INTRAVENOUS
Status: COMPLETED
Start: 2025-01-08 | End: 2025-01-08

## 2025-01-08 RX ORDER — SODIUM CHLORIDE, SODIUM LACTATE, POTASSIUM CHLORIDE, CALCIUM CHLORIDE 600; 310; 30; 20 MG/100ML; MG/100ML; MG/100ML; MG/100ML
50 INJECTION, SOLUTION INTRAVENOUS CONTINUOUS
Status: ACTIVE | OUTPATIENT
Start: 2025-01-08 | End: 2025-01-08

## 2025-01-08 RX ORDER — FUROSEMIDE 10 MG/ML
INJECTION INTRAMUSCULAR; INTRAVENOUS AS NEEDED
Status: DISCONTINUED | OUTPATIENT
Start: 2025-01-08 | End: 2025-01-08 | Stop reason: HOSPADM

## 2025-01-08 RX ORDER — NITROGLYCERIN 40 MG/100ML
INJECTION INTRAVENOUS AS NEEDED
Status: DISCONTINUED | OUTPATIENT
Start: 2025-01-08 | End: 2025-01-08 | Stop reason: HOSPADM

## 2025-01-08 RX ORDER — PHENYLEPHRINE HCL IN 0.9% NACL 0.4MG/10ML
SYRINGE (ML) INTRAVENOUS
Status: DISCONTINUED
Start: 2025-01-08 | End: 2025-01-08 | Stop reason: WASHOUT

## 2025-01-08 RX ORDER — CLOPIDOGREL BISULFATE 300 MG/1
TABLET, FILM COATED ORAL AS NEEDED
Status: DISCONTINUED | OUTPATIENT
Start: 2025-01-08 | End: 2025-01-08 | Stop reason: HOSPADM

## 2025-01-08 RX ADMIN — LIDOCAINE HYDROCHLORIDE 1 MG/MIN: 8 INJECTION, SOLUTION INTRAVENOUS at 01:55

## 2025-01-08 RX ADMIN — FENTANYL CITRATE 25 MCG: 50 INJECTION INTRAMUSCULAR; INTRAVENOUS at 14:15

## 2025-01-08 RX ADMIN — SODIUM CHLORIDE, POTASSIUM CHLORIDE, SODIUM LACTATE AND CALCIUM CHLORIDE 500 ML: 600; 310; 30; 20 INJECTION, SOLUTION INTRAVENOUS at 15:59

## 2025-01-08 RX ADMIN — SODIUM CHLORIDE, POTASSIUM CHLORIDE, SODIUM LACTATE AND CALCIUM CHLORIDE 50 ML/HR: 600; 310; 30; 20 INJECTION, SOLUTION INTRAVENOUS at 10:50

## 2025-01-08 RX ADMIN — LIDOCAINE HYDROCHLORIDE 1 MG/MIN: 8 INJECTION, SOLUTION INTRAVENOUS at 20:00

## 2025-01-08 RX ADMIN — ASPIRIN 81 MG: 81 TABLET, COATED ORAL at 08:05

## 2025-01-08 RX ADMIN — ROSUVASTATIN CALCIUM 20 MG: 20 TABLET, FILM COATED ORAL at 08:05

## 2025-01-08 RX ADMIN — FENTANYL CITRATE 25 MCG: 50 INJECTION, SOLUTION INTRAMUSCULAR; INTRAVENOUS at 14:15

## 2025-01-08 ASSESSMENT — PAIN SCALES - GENERAL
PAINLEVEL_OUTOF10: 0 - NO PAIN

## 2025-01-08 ASSESSMENT — PAIN - FUNCTIONAL ASSESSMENT
PAIN_FUNCTIONAL_ASSESSMENT: 0-10

## 2025-01-08 NOTE — PRE-SEDATION DOCUMENTATION
Sedation Plan    ASA 3     Mallampati class: II.    Risks, benefits, and alternatives discussed with patient.    Pt NPO since midnight, all questions answered. No previous issues with anesthesia.

## 2025-01-08 NOTE — CARE PLAN
Problem: Safety - Adult  Goal: Free from fall injury  Outcome: Progressing  Flowsheets (Taken 1/8/2025 0536)  Free from fall injury:   Based on caregiver fall risk screen, instruct family/caregiver to ask for assistance with transferring infant if caregiver noted to have fall risk factors   Instruct family/caregiver on patient safety     Problem: Fall/Injury  Goal: Not fall by end of shift  Outcome: Progressing  Goal: Be free from injury by end of the shift  Outcome: Progressing  Goal: Verbalize understanding of personal risk factors for fall in the hospital  Outcome: Progressing  Goal: Verbalize understanding of risk factor reduction measures to prevent injury from fall in the home  Outcome: Progressing  Goal: Use assistive devices by end of the shift  Outcome: Progressing  Goal: Pace activities to prevent fatigue by end of the shift  Outcome: Progressing     Problem: Skin  Goal: Participates in plan/prevention/treatment measures  Outcome: Progressing  Goal: Prevent/manage excess moisture  Outcome: Progressing  Goal: Prevent/minimize sheer/friction injuries  Outcome: Progressing  Goal: Promote/optimize nutrition  Outcome: Progressing

## 2025-01-08 NOTE — INTERVAL H&P NOTE
Patient seen and assessed pre-procedure. Allergies and current medications reviewed. NPO since midnight, all questions answered. Plan for LHC. Denies any current CP or SOB, comfortable on RA, and reports being able to lay flat. Hgb/Plt 9.7/191, GFR/Cr 36/1.92. Patient currently on SAPT with ASA 81 as well as Heparin drip.     H&P reviewed. The patient was examined and there are no changes to the H&P.

## 2025-01-08 NOTE — PROGRESS NOTES
Occupational Therapy                 Therapy Communication Note    Patient Name: Wojciech Kern  MRN: [id number]  Department: [facility]  Room: 08/08-A  Today's Date: 1/8/2025     Discipline: Occupational Therapy    OT Missed Visit: Yes     Missed Visit Reason: Missed Visit Reason: Patient placed on medical hold (Currently on bedrest s/p LHC. Sheath in place. Hold OT at this time.)    Missed Time: Attempt

## 2025-01-08 NOTE — POST-PROCEDURE NOTE
Physician Transition of Care Summary  Invasive Cardiovascular Lab    Procedure Date: 1/8/2025  Attending:    * Noam Duron - Primary  Resident/Fellow/Other Assistant: Surgeons and Role:     * Kalyan Tomlinson MD - Fellow     * Oneil Dela Cruz MD - Fellow    Indications:   Pre-op Diagnosis      * VT (ventricular tachycardia) (Multi) [I47.20]     * NSTEMI (non-ST elevated myocardial infarction) (Multi) [I21.4]    Post-procedure diagnosis:   Post-op Diagnosis     * VT (ventricular tachycardia) (Multi) [I47.20]     * NSTEMI (non-ST elevated myocardial infarction) (Multi) [I21.4]    Procedure(s):   Left Heart Cath  26858 - DC L HRT CATH W/NJX L VENTRICULOGRAPHY IMG S&I    IVUS - Coronary  46021 - DC ENDOLUMINAL CORONARY IVUS OCT I&R INITIAL VESSEL    PCI ADAM Stent- Coronary    Left Heart Cath, No LV, With Grafts    Left Heart Cath, With LV, Angriography And Grafts        Procedure Findings:   IVUS assisted PCI of the Prox Lcx        Description of the Procedure:   RFA, USS guided   JR4, JL 4  EBU 3.5  Sheath left in place   To remove once the ACT is <170    IV 40 lasix stat given in the lab as patient got tachycardic - mechanism likely ischemia induced decompensation leading to flash pulmonary oedema   LVEDP 40mmHg, IC nitroglycerin 200mcg, reduced the LVEDP to 22mmHg    To continue on 50ml of IVF an hour for 8 hours  No ACE inhibitors tonight or frusemide tonight        Complications:   Nil     Stents/Implants:   Implants       Stent    Stent, Trona Omaha Adam, 3.50 X 18rx - [id number] - Implanted        Inventory item: STENT, ARIANNA FRONTIER ADAM, 3.50 X 18RX Model/Cat number: [id number]    : [employer] Lot number: [id number]    Device identifier: [id number]        GUDID Information       Request status Successful        Brand name: Trona Omaha™ Version/Model: [id number]    Company name: [employer] MRI safety info as of 1/8/25: MR Conditional    Contains dry or latex rubber: No      GMDN  P.T. name: Drug-eluting coronary artery stent, non-bioabsorbable-polymer-coated                As of 1/8/2025       Status: Implanted                              Anticoagulation/Antiplatelet Plan:   To continue on aspirin and clopidogrel   Loaded in the lab 600mg    Estimated Blood Loss:   25 mL    Anesthesia: Moderate Sedation Anesthesia Staff: No anesthesia staff entered.    Any Specimen(s) Removed:   No specimens collected during this procedure.    [facility]      Electronically signed by: Oneil Dela Cruz MD, 1/8/2025 10:31 AM

## 2025-01-08 NOTE — PROGRESS NOTES
"Subjective   NAOE. Telemetry is without additional episodes of VT. Underwent coronary angiography with PCI to the ostial LCx.     Objective   Visit Vitals  /60   Pulse 77   Temp 36.4 °C (97.5 °F) (Temporal)   Resp 15   Ht 1.778 m (5' 10\")   Wt 66.2 kg (145 lb 15.1 oz)   SpO2 100%   BMI 20.94 kg/m²   Smoking Status Former   BSA 1.81 m²      Physical Exam  General: awake, alert, no acute distress  HEENT: no scleral icterus, no JVD  CV: RRR, no MRG  Resp: CTA b/l, no wheezes, rales, or rhonchi  Abd: soft, NT/ND  LE: no edema, no cyanosis  Neuo: grossly normal  Psych: pleasant      Results for orders placed during the hospital encounter of 01/05/25    Transthoracic Echo (TTE) Complete    Narrative  [facility], [address] [place] [address]  Tel [phone] and Fax [phone]    TRANSTHORACIC ECHOCARDIOGRAM REPORT      Patient Name:       DANICA Mccall Physician:    [id number] Cl Esteban MD  Study Date:         1/6/2025            Ordering Provider:    [id number] CARL B GILLOMBARDO  MRN/PID:            [id number]            Fellow:               [id number] Kassy Field MD  Accession#:         [id number]        Nurse:  Date of Birth/Age:  1950 / 74 years Sonographer:          Jana Roger RDCS  Gender assigned at  M                   Additional Staff:  Birth:  Height:             177.80 cm           Admit Date:           1/5/2025  Weight:             66.23 kg            Admission Status:     Inpatient -  Routine  BSA / BMI:          1.83 m2 / 20.95     Encounter#:           [id number]  kg/m2  Blood Pressure:     105/59 mmHg         Department Location:  [facility]    Study Type:    TRANSTHORACIC ECHO (TTE) COMPLETE  Diagnosis/ICD: Ventricular tachycardia, unspecified-I47.20; Non ST elevation  (NSTEMI) myocardial infarction-I21.4  Indication:    NSTEMI, VT  CPT Code:      Echo Complete w Full Doppler-02624    Patient History:  Pertinent History: ICM/HFrEF (EF 26% 12/2024) " s/p sqICD (2018), CAD s/p HELEN x4  to RCA c/b VSD s/p CABG x1 (SVG-LAD and VSD repair in 2015),  HTN, COPD, CKD3b.    Study Detail: The following Echo studies were performed: 2D, M-Mode, Doppler and  color flow. Definity used as a contrast agent for endocardial  border definition. Total contrast used for this procedure was 4 mL  via IV push.      PHYSICIAN INTERPRETATION:  Left Ventricle: Left ventricular ejection fraction is severely decreased, by visual estimate at 25-30%. There is mild concentric left ventricular hypertrophy. Including the akinetic aneurysmalThere are multiple left ventricular wall motion abnormalities. The left ventricular cavity size is severely dilated. There is normal septal and mildly increased posterior left ventricular wall thickness. Abnormal (paradoxical) septal motion consistent with post-operative status and abnormal (paradoxical) septal motion, consistent with an intraventricular conduction delay. Spectral Doppler shows a Grade I (impaired relaxation pattern) of left ventricular diastolic filling with normal left atrial filling pressure. 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 There is involvement in the basal inferior wall. There is also hypo- to akinesia of the lateral wall and the septal-apical segments. Including the akinetic inferolateal aneurysm, the LV EF is ~ 15-20%.  Left Atrium: The left atrium is severely dilated. There is no evidence of a patent foramen ovale. A bubble study using agitated saline was performed. Bubble study is negative.  Right Ventricle: The right ventricle is normal in size. There is reduced right ventricular systolic function. TAPSE= 14.6 mm; RV S'= 5 cm/s.  Right Atrium: The right atrium is normal in size.  Aortic Valve: The aortic valve is trileaflet. The aortic valve dimensionless index is 0.66. There is no evidence of aortic valve regurgitation. The peak instantaneous gradient of the aortic valve is 6 mmHg. The mean  gradient of the aortic valve is 3 mmHg.  Mitral Valve: The mitral valve is mildly thickened. There is mild mitral valve regurgitation.  Tricuspid Valve: The tricuspid valve is structurally normal. There is trace tricuspid regurgitation. The right ventricular systolic pressure is unable to be estimated.  Pulmonic Valve: The pulmonic valve is not well visualized. There is trace pulmonic valve regurgitation.  Pericardium: There is no pericardial effusion noted.  Aorta: The aortic root is abnormal. There is mild dilatation of the aortic root.  Systemic Veins: The inferior vena cava appears normal in size.  In comparison to the previous echocardiogram(s): Compared with study dated 12/10/2024, no significant change.      CONCLUSIONS:  1. Left ventricular ejection fraction is severely decreased, by visual estimate at 25-30%.  2. Spectral Doppler shows a Grade I (impaired relaxation pattern) of left ventricular diastolic filling with normal left atrial filling pressure.  3. Left ventricular cavity size is severely dilated.  4. Abnormal septal motion consistent with post-operative status and abnormal septal motion, consistent with intraventricular conduction delay.  5. 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 There is involvement in the basal inferior wall. There is also hypo- to akinesia of the lateral wall and the septal-apical segments. Including the akinetic inferolateal aneurysm, the LV EF is ~ 15-20%.  6. There is reduced right ventricular systolic function.  7. TAPSE= 14.6 mm; RV S'= 5 cm/s.  8. The left atrium is severely dilated.  9. There is no evidence of a patent foramen ovale.    QUANTITATIVE DATA SUMMARY:    2D MEASUREMENTS:          Normal Ranges:  Ao Root d:       3.70 cm  (2.0-3.7cm)  LAs:             5.50 cm  (2.7-4.0cm)  IVSd:            0.92 cm  (0.6-1.1cm)  LVPWd:           1.21 cm  (0.6-1.1cm)  LVIDd:           5.47 cm  (3.9-5.9cm)  LVIDs:           5.00 cm  LV Mass Index:    126 g/m2  LV % FS          8.6 %      LA VOLUME:                    Normal Ranges:  LA Vol A4C:        159.2 ml   (22+/-6mL/m2)  LA Vol A2C:        112.8 ml  LA Vol BP:         139.7 ml  LA Vol Index A4C:  87.2ml/m2  LA Vol Index A2C:  61.8 ml/m2  LA Vol Index BP:   76.5 ml/m2  LA Area A4C:       36.9 cm2  LA Area A2C:       29.8 cm2  LA Major Axis A4C: 7.3 cm  LA Major Axis A2C: 6.7 cm  LA Volume Index:   76.5 ml/m2      RA VOLUME BY A/L METHOD:          Normal Ranges:  RA Area A4C:             16.9 cm2      AORTA MEASUREMENTS:         Normal Ranges:  Asc Ao, d:          3.30 cm (2.1-3.4cm)      LV SYSTOLIC FUNCTION BY 2D PLANIMETRY (MOD):  Normal Ranges:  EF-A4C View:    35 % (>=55%)  EF-Visual:      28 %  LV EF Reported: 28 %      LV DIASTOLIC FUNCTION:             Normal Ranges:  MV Peak E:             0.51 m/s    (0.7-1.2 m/s)  MV Peak A:             0.93 m/s    (0.42-0.7 m/s)  E/A Ratio:             0.55        (1.0-2.2)  MV e'                  0.047 m/s   (>8.0)  MV lateral e'          0.06 m/s  MV medial e'           0.03 m/s  MV A Dur:              127.00 msec  E/e' Ratio:            10.87       (<8.0)  a'                     0.06 m/s  MV DT:                 260 msec    (150-240 msec)  PulmV Sys Cricket:         36.00 cm/s  PulmV Kay Cricket:        22.90 cm/s  PulmV S/D Cricket:         1.60  PulmV A Revs Cricket:      25.70 cm/s  PulmV A Revs Dur:      130.00 msec      MITRAL VALVE:          Normal Ranges:  MV DT:        260 msec (150-240msec)      MITRAL INSUFFICIENCY:             Normal Ranges:  MR VTI:               176.00 cm  MR Vmax:              498.00 cm/s      AORTIC VALVE:                     Normal Ranges:  AoV Vmax:                1.26 m/s (<=1.7m/s)  AoV Peak P.4 mmHg (<20mmHg)  AoV Mean PG:             3.0 mmHg (1.7-11.5mmHg)  LVOT Max Cricket:            1.02 m/s (<=1.1m/s)  AoV VTI:                 24.80 cm (18-25cm)  LVOT VTI:                16.40 cm  LVOT Diameter:           2.00 cm   (1.8-2.4cm)  AoV Area, VTI:           2.08 cm2 (2.5-5.5cm2)  AoV Area,Vmax:           2.54 cm2 (2.5-4.5cm2)  AoV Dimensionless Index: 0.66      RIGHT VENTRICLE:  RV Basal 3.60 cm  RV Mid   2.80 cm  RV Major 8.1 cm  TAPSE:   14.6 mm  RV s'    0.05 m/s      TRICUSPID VALVE/RVSP:         Normal Ranges:  IVC Diam:             1.50 cm      PULMONIC VALVE:          Normal Ranges:  PV Accel Time:  120 msec (>120ms)  PV Max Cricket:     0.9 m/s  (0.6-0.9m/s)  PV Max PG:      3.6 mmHg      Pulmonary Veins:  PulmV A Revs Dur: 130.00 msec  PulmV A Revs Cricket: 25.70 cm/s  PulmV Kay Cricket:   22.90 cm/s  PulmV S/D Cricket:    1.60  PulmV Sys Cricket:    36.00 cm/s      [id number] Cl Esteban MD  Electronically signed on 1/6/2025 at 6:49:21 PM        ** Final **       Imaging  Chest x-ray: @CXR@    Lab Review   Lab Results   Component Value Date     01/08/2025    K 4.5 01/08/2025     01/08/2025    CO2 27 01/08/2025    BUN 57 (H) 01/08/2025    CREATININE 1.92 (H) 01/08/2025    GLUCOSE 106 (H) 01/08/2025    CALCIUM 9.0 01/08/2025     Lab Results   Component Value Date    WBC 6.1 01/08/2025    HGB 9.7 (L) 01/08/2025    HCT 27.7 (L) 01/08/2025    MCV 91 01/08/2025     01/08/2025       Troponin I, High Sensitivity   Date/Time Value Ref Range Status   01/05/2025 06:36 PM 5,744 (HH) 0 - 20 ng/L Final     Comment:     Previous result verified on 1/5/2025 1356 on specimen/case [id number] called with component [facility] for procedure Troponin I, High Sensitivity, Initial with value 89 ng/L.   01/05/2025 05:12 PM 4,620 (HH) 0 - 20 ng/L Final     Comment:     Previous result verified on 1/5/2025 1356 on specimen/case [id number] called with component [facility] for procedure Troponin I, High Sensitivity, Initial with value 89 ng/L.   01/05/2025 03:53 PM 3,507 (HH) 0 - 20 ng/L Final     Comment:     Previous result verified on 1/5/2025 1356 on specimen/case [id number] called with component [facility] for procedure Troponin I, High Sensitivity,  Initial with value 89 ng/L.     Troponin I, High Sensitivity (CMC)   Date/Time Value Ref Range Status   01/06/2025 02:44 AM 8,369 (HH) 0 - 53 ng/L Final     Comment:     Previous result verified on 1/6/2025 0002 on specimen/case [id number] called with component [facility] for procedure Troponin I, High Sensitivity with value 9,896 ng/L.   01/05/2025 11:17 PM 9,896 (HH) 0 - 53 ng/L Final     BNP   Date/Time Value Ref Range Status   01/06/2025 05:08  (H) 0 - 99 pg/mL Final   10/08/2024 03:15  (H) 0 - 99 pg/mL Final   02/21/2024 05:59 AM 1,257 (H) 0 - 99 pg/mL Final     LDL   Date/Time Value Ref Range Status   06/21/2023 09:16 AM 52 0 - 99 mg/dL Final     Comment:     .                           NEAR      BORD      AGE      DESIRABLE  OPTIMAL    HIGH     HIGH     VERY HIGH     0-19 Y     0 - 109     ---    110-129   >/= 130     ----    20-24 Y     0 - 119     ---    120-159   >/= 160     ----      >24 Y     0 -  99   100-129  130-159   160-189     >/=190  .   06/17/2022 09:00 AM 46 0 - 99 mg/dL Final     Comment:     .                           NEAR      BORD      AGE      DESIRABLE  OPTIMAL    HIGH     HIGH     VERY HIGH     0-19 Y     0 - 109     ---    110-129   >/= 130     ----    20-24 Y     0 - 119     ---    120-159   >/= 160     ----      >24 Y     0 -  99   100-129  130-159   160-189     >/=190  .       Triglycerides   Date/Time Value Ref Range Status   07/11/2024 10:24 AM 79 0 - 149 mg/dL Final     Comment:        Age         Desirable   Borderline High   High     Very High   0 D-90 D    19 - 174         ----         ----        ----  91 D- 9 Y     0 -  74        75 -  99     >/= 100      ----    10-19 Y     0 -  89        90 - 129     >/= 130      ----    20-24 Y     0 - 114       115 - 149     >/= 150      ----         >24 Y     0 - 149       150 - 199    200- 499    >/= 500    Venipuncture immediately after or during the administration of Metamizole may lead to falsely low results. Testing  should be performed immediately prior to Metamizole dosing.   06/21/2023 09:16 AM 43 0 - 149 mg/dL Final     Comment:     .      AGE      DESIRABLE   BORDERLINE HIGH   HIGH     VERY HIGH   0 D-90 D    19 - 174         ----         ----        ----  91 D- 9 Y     0 -  74        75 -  99     >/= 100      ----    10-19 Y     0 -  89        90 - 129     >/= 130      ----    20-24 Y     0 - 114       115 - 149     >/= 150      ----         >24 Y     0 - 149       150 - 199    200- 499    >/= 500  .   Venipuncture immediately after or during the    administration of Metamizole may lead to falsely   low results. Testing should be performed immediately   prior to Metamizole dosing.   06/17/2022 09:00 AM 52 0 - 149 mg/dL Final     Comment:     .      AGE      DESIRABLE   BORDERLINE HIGH   HIGH     VERY HIGH   0 D-90 D    19 - 174         ----         ----        ----  91 D- 9 Y     0 -  74        75 -  99     >/= 100      ----    10-19 Y     0 -  89        90 - 129     >/= 130      ----    20-24 Y     0 - 114       115 - 149     >/= 150      ----         >24 Y     0 - 149       150 - 199    200- 499    >/= 500  .   Venipuncture immediately after or during the    administration of Metamizole may lead to falsely   low results. Testing should be performed immediately   prior to Metamizole dosing.            Assessment/Plan   74 y.o. male w/ ICM/HFrEF (EF 26% 12/2024) s/p sqICD (2018), CAD s/p HELEN x4 to RCA c/b VSD s/p CABG x1 (SVG-LAD and VSD repair in 2015), HTN, COPD, CKD3b [facility]. EP is consulted for the same. Device interrogation and EMS ECG demonstrate wide complex regular tachycardia that starts at a rate of approx 150bpm, but accelerates later with a change in morphology concerning for VF. Suspect this is in the setting of NSTEMI given concomitant chest pain and history of significant CAD. He underwent PCI of the ostial LCx on 1/8. Ability to uptitrate beta  blocker limited due to bradycardia.      Impression:  #VF/VT  #NSTEMI     Recommendations:  -Stop lidocaine  -Observe HR for recovery and uptitrate beta blocker as tolerated     Marlene Cota MD  PGY-5 Cardiovascular Medicine Fellow    Patient seen on rounds and has done well post PCI. Agree with rec for betablocker for both heart failure and VT.    ZELDA Britton MD    If further questions arise, please page the EP [place]ager at [phone] on weekdays 7AM - 6PM and weekends 7AM - 2PM, or at [phone] at all other times. The EP device nurse can be reached at pager [phone] during regular business hours M-F.

## 2025-01-08 NOTE — PROGRESS NOTES
"Wojciech Kern is a 74 y.o. male on day 3 of admission presenting with VT (ventricular tachycardia) (Multi).    Subjective   NAEON. NPO for [facility] today, however renal function still above baseline so will cancel for today.     Patient feels well today, no chest pain, no dyspnea.    Objective     General: Awake, alert, in no acute distress, sitting up in bed  HEENT: Normocephalic, atraumatic  CV: Heart with regular rate and rhythm, no murmurs appreciated, no JVD neck veins appear flat  Lungs: Clear to auscultation bilaterally with no wheezes, crackles, or rhonchi  GI: Soft, nontender, nondistended  Extremities: 2+ pulses bilaterally, no lower extremity edema  Neuro: Moves all extremities equally, strength 5/5 bilaterally      Last Recorded Vitals  Blood pressure 116/73, pulse 55, temperature 36.2 °C (97.2 °F), temperature source Temporal, resp. rate 18, height 1.778 m (5' 10\"), weight 66.2 kg (145 lb 15.1 oz), SpO2 99%.  Intake/Output last 3 Shifts:  I/O last 3 completed shifts:  In: 2365.6 (34.7 mL/kg) [P.O.:780; I.V.:585.6 (8.6 mL/kg); IV Piggyback:1000]  Out: 1250 (18.3 mL/kg) [Urine:1250 (0.5 mL/kg/hr)]  Weight: 68.2 kg     Relevant Results  Scheduled medications  aspirin, 81 mg, oral, Daily  [Held by provider] clopidogrel, 75 mg, oral, Daily  [Held by provider] lisinopril, 10 mg, oral, Daily  rosuvastatin, 20 mg, oral, Daily  [Held by provider] spironolactone, 12.5 mg, oral, Daily  tiotropium, 2 puff, inhalation, Daily  [Held by provider] torsemide, 50 mg, oral, Daily      Continuous medications  heparin, 0-4,000 Units/hr, Last Rate: 1,200 Units/hr (01/08/25 0500)  lidocaine, 1 mg/min, Last Rate: 1 mg/min (01/08/25 0500)      PRN medications  PRN medications: heparin  Results for orders placed or performed during the hospital encounter of 01/05/25 (from the past 24 hours)   Heparin Assay, UFH   Result Value Ref Range    Heparin Unfractionated 1.6 (HH) See Comment Below for Therapeutic Ranges IU/mL   Heparin Assay, " UFH   Result Value Ref Range    Heparin Unfractionated 0.2 See Comment Below for Therapeutic Ranges IU/mL   Heparin Assay, UFH   Result Value Ref Range    Heparin Unfractionated 0.2 See Comment Below for Therapeutic Ranges IU/mL   Renal function panel   Result Value Ref Range    Glucose 125 (H) 74 - 99 mg/dL    Sodium 136 136 - 145 mmol/L    Potassium 4.4 3.5 - 5.3 mmol/L    Chloride 103 98 - 107 mmol/L    Bicarbonate 26 21 - 32 mmol/L    Anion Gap 11 10 - 20 mmol/L    Urea Nitrogen 68 (H) 6 - 23 mg/dL    Creatinine 2.07 (H) 0.50 - 1.30 mg/dL    eGFR 33 (L) >60 mL/min/1.73m*2    Calcium 9.2 8.6 - 10.6 mg/dL    Phosphorus 3.5 2.5 - 4.9 mg/dL    Albumin 4.1 3.4 - 5.0 g/dL   Heparin Assay, UFH   Result Value Ref Range    Heparin Unfractionated 0.6 See Comment Below for Therapeutic Ranges IU/mL   Heparin Assay, UFH   Result Value Ref Range    Heparin Unfractionated 0.6 See Comment Below for Therapeutic Ranges IU/mL   CBC and Auto Differential   Result Value Ref Range    WBC 6.1 4.4 - 11.3 x10*3/uL    nRBC 0.0 0.0 - 0.0 /100 WBCs    RBC 3.04 (L) 4.50 - 5.90 x10*6/uL    Hemoglobin 9.7 (L) 13.5 - 17.5 g/dL    Hematocrit 27.7 (L) 41.0 - 52.0 %    MCV 91 80 - 100 fL    MCH 31.9 26.0 - 34.0 pg    MCHC 35.0 32.0 - 36.0 g/dL    RDW 13.2 11.5 - 14.5 %    Platelets 191 150 - 450 x10*3/uL    Neutrophils % 54.3 40.0 - 80.0 %    Immature Granulocytes %, Automated 0.3 0.0 - 0.9 %    Lymphocytes % 32.0 13.0 - 44.0 %    Monocytes % 9.6 2.0 - 10.0 %    Eosinophils % 3.0 0.0 - 6.0 %    Basophils % 0.8 0.0 - 2.0 %    Neutrophils Absolute 3.29 1.60 - 5.50 x10*3/uL    Immature Granulocytes Absolute, Automated 0.02 0.00 - 0.50 x10*3/uL    Lymphocytes Absolute 1.94 0.80 - 3.00 x10*3/uL    Monocytes Absolute 0.58 0.05 - 0.80 x10*3/uL    Eosinophils Absolute 0.18 0.00 - 0.40 x10*3/uL    Basophils Absolute 0.05 0.00 - 0.10 x10*3/uL   Renal function panel   Result Value Ref Range    Glucose 106 (H) 74 - 99 mg/dL    Sodium 139 136 - 145 mmol/L     Potassium 4.5 3.5 - 5.3 mmol/L    Chloride 105 98 - 107 mmol/L    Bicarbonate 27 21 - 32 mmol/L    Anion Gap 12 10 - 20 mmol/L    Urea Nitrogen 57 (H) 6 - 23 mg/dL    Creatinine 1.92 (H) 0.50 - 1.30 mg/dL    eGFR 36 (L) >60 mL/min/1.73m*2    Calcium 9.0 8.6 - 10.6 mg/dL    Phosphorus 3.3 2.5 - 4.9 mg/dL    Albumin 3.6 3.4 - 5.0 g/dL   Magnesium   Result Value Ref Range    Magnesium 2.33 1.60 - 2.40 mg/dL     *Note: Due to a large number of results and/or encounters for the requested time period, some results have not been displayed. A complete set of results can be found in Results Review.   XR chest 1 view    Result Date: 1/5/2025  STUDY: Chest Radiograph;  01/05/2025 1:03 PM INDICATION: Chest pain. COMPARISON: 02/18/2024 XR Chest. 08/20/2023 XR Chest. ACCESSION NUMBER(S): [id number] ORDERING CLINICIAN: STEFFEN SIMERLINK TECHNIQUE:  Frontal chest was obtained at 13:03 hours. FINDINGS: The patient status post median sternotomy.  There is a defibrillator present. CARDIOMEDIASTINAL SILHOUETTE: Cardiomediastinal silhouette is enlarged..  LUNGS: The previous findings suggestive of pulmonary edema have decreased. There is still partial silhouetting of the left hemidiaphragm most likely representing atelectasis.  ABDOMEN: No remarkable upper abdominal findings.  BONES: No acute osseous changes.    Probable left basilar atelectasis. Signed by Rich Kinsey MD    Transthoracic echo (TTE) complete    Result Date: 12/11/2024   [facility] [place] [facility]– [place], [address] [place] [address]              Tel [phone] and Fax [phone] TRANSTHORACIC ECHOCARDIOGRAM REPORT  Patient Name:       DANICA Mccall Physician:    [id number] Zackery Galdamez DO Study Date:         12/10/2024          Ordering Provider:    [id number] JAMIE BAGLEY MRN/PID:            [id number]            Fellow: Accession#:         [id number]        Nurse: Date of Birth/Age:  1950 / 74  years Sonographer:          Aryan Smiley                                                               RDSWAPNIL Gender assigned at  M                   Additional Staff: Birth: Height:             175.00 cm           Admit Date:           12/10/2024 Weight:             70.00 kg            Admission Status:     Outpatient BSA / BMI:          1.85 m2 / 22.86     Encounter#:           [id number]                     kg/m2 Blood Pressure:     130/80 mmHg         Department Location:  [facility] Non                                                               Invasive Study Type:    TRANSTHORACIC ECHO (TTE) COMPLETE Diagnosis/ICD: Atherosclerotic heart disease of native coronary artery without                angina pectoris-I25.10 Indication:    CAD CPT Code:      Echo Complete w Full Doppler-95622 Patient History: Valve Disorders:   Mitral Regurgitation. Pacer/Defib:       AICD Pertinent History: CAD. NSTEMI, HFrEF, LV aneurysm. Study Detail: The following Echo studies were performed: 2D, M-Mode, Doppler and               color flow. Technically challenging study due to body habitus and               poor acoustic windows. Definity used as a contrast agent for               endocardial border definition. Total contrast used for this               procedure was 3 mL via IV push.  PHYSICIAN INTERPRETATION: Left Ventricle: Left ventricular ejection fraction is severely decreased, calculated by Mckeon's biplane at 26%. There is severely increased eccentric left ventricular hypertrophy. There is global hypokinesis of the left ventricle with minor regional variations. The left ventricular cavity size is moderate to severely dilated. There is normal septal and normal posterior left ventricular wall thickness. Spectral Doppler shows an abnormal pattern of left ventricular diastolic filling. There is no definite left ventricular thrombus visualized. A large calcified aneurysm affecting the basal Inferior- Inferolateral wall is  again noted. Left Atrium: The left atrium is severely dilated. Right Ventricle: The right ventricle is normal in size. There is normal right ventricular global systolic function. Right Atrium: The right atrium is normal in size. Aortic Valve: The aortic valve is trileaflet. The aortic valve dimensionless index is 0.65. There is no evidence of aortic valve regurgitation. The peak instantaneous gradient of the aortic valve is 5 mmHg. The mean gradient of the aortic valve is 3 mmHg. Mitral Valve: The mitral valve is mild to moderately thickened. There is moderately decreased mitral valve posterior leaflet mobility. The peak instantaneous gradient of the mitral valve is 5 mmHg. There is mild to moderate mitral valve regurgitation which is eccentrically directed. The mitral regurgitant orifice area is 8 mm2. The mitral regurgitant volume is 14.09 ml. Tricuspid Valve: The tricuspid valve is structurally normal. There is trace to mild tricuspid regurgitation. The Doppler estimated RVSP is within normal limits at 17.5 mmHg. Pulmonic Valve: The pulmonic valve is structurally normal. There is physiologic pulmonic valve regurgitation. Pericardium: There is no pericardial effusion noted. Aorta: The aortic root is normal. There is mild dilatation of the ascending aorta. There is no dilatation of the aortic root. In comparison to the previous echocardiogram(s): Compared with study dated 11/7/2023, no significant change.  CONCLUSIONS:  1. Left ventricular ejection fraction is severely decreased, calculated by Mckeon's biplane at 26%.  2. There is global hypokinesis of the left ventricle with minor regional variations.  3. Spectral Doppler shows an abnormal pattern of left ventricular diastolic filling.  4. Left ventricular cavity size is moderate to severely dilated.  5. 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  6. No left ventricular thrombus visualized.  7. There is severely  increased eccentric left ventricular hypertrophy.  8. There is normal right ventricular global systolic function.  9. The left atrium is severely dilated. 10. Mild to moderate mitral valve regurgitation. 11. Moderately decreased mitral valve posterior leaflet mobility. 12. Right ventricular systolic pressure is within normal limits. QUANTITATIVE DATA SUMMARY:  2D MEASUREMENTS:           Normal Ranges: IVSd:            1.02 cm   (0.6-1.1cm) LVPWd:           0.64 cm   (0.6-1.1cm) LVIDd:           10.18 cm  (3.9-5.9cm) LVIDs:           8.31 cm LV Mass Index:   385 g/m2 LVEDV Index:     122 ml/m2 LV % FS          18.3 %  LA VOLUME:                    Normal Ranges: LA Vol A4C:        79.6 ml    (22+/-6mL/m2) LA Vol A2C:        93.8 ml LA Vol BP:         89.4 ml LA Vol Index A4C:  43.1ml/m2 LA Vol Index A2C:  50.8 ml/m2 LA Vol Index BP:   48.3 ml/m2 LA Area A4C:       24.1 cm2 LA Area A2C:       25.3 cm2 LA Major Axis A4C: 6.2 cm LA Major Axis A2C: 5.8 cm LA Volume Index:   48.3 ml/m2 LA Vol A4C:        75.4 ml LA Vol A2C:        87.0 ml LA Vol Index BSA:  43.9 ml/m2  RA VOLUME BY A/L METHOD:          Normal Ranges: RA Area A4C:             15.9 cm2  AORTA MEASUREMENTS:         Normal Ranges: Ao Sinus, d:        3.10 cm (2.1-3.5cm) Ao STJ, d:          3.30 cm (1.7-3.4cm) Asc Ao, d:          3.60 cm (2.1-3.4cm)  LV SYSTOLIC FUNCTION BY 2D PLANIMETRY (MOD):                      Normal Ranges: EF-A4C View:    37 % (>=55%) EF-A2C View:    24 % EF-Biplane:     26 % LV EF Reported: 26 %  LV DIASTOLIC FUNCTION:             Normal Ranges: MV Peak E:             0.64 m/s    (0.7-1.2 m/s) MV Peak A:             0.94 m/s    (0.42-0.7 m/s) E/A Ratio:             0.68        (1.0-2.2) MV e'                  0.055 m/s   (>8.0) MV lateral e'          0.08 m/s MV medial e'           0.03 m/s E/e' Ratio:            11.59       (<8.0) PulmV Sys Cricket:         44.36 cm/s PulmV Kay Cricket:        14.21 cm/s PulmV S/D Cricket:         3.12 PulmV A  Revs Cricket:      25.02 cm/s PulmV A Revs Dur:      106.11 msec  MITRAL VALVE:          Normal Ranges: MV Vmax:      1.15 m/s (<=1.3m/s) MV peak P.2 mmHg (<5mmHg) MV mean P.8 mmHg (<2mmHg) MV VTI:       30.25 cm (10-13cm) MV DT:        275 msec (150-240msec)  MITRAL INSUFFICIENCY:             Normal Ranges: MR VTI:               176.26 cm MR Vmax:              484.55 cm/s MR Volume:            14.09 ml MR Flow Rt:           38.72 ml/s MR EROA:              8 mm2  AORTIC VALVE:                     Normal Ranges: AoV Vmax:                1.15 m/s (<=1.7m/s) AoV Peak P.3 mmHg (<20mmHg) AoV Mean PG:             3.5 mmHg (1.7-11.5mmHg) LVOT Max Cricket:            0.78 m/s (<=1.1m/s) AoV VTI:                 26.88 cm (18-25cm) LVOT VTI:                17.44 cm LVOT Diameter:           2.27 cm  (1.8-2.4cm) AoV Area, VTI:           2.62 cm2 (2.5-5.5cm2) AoV Area,Vmax:           2.74 cm2 (2.5-4.5cm2) AoV Dimensionless Index: 0.65  RIGHT VENTRICLE: RV Basal 3.40 cm RV Mid   2.00 cm RV Major 7.5 cm TAPSE:   14.0 mm  TRICUSPID VALVE/RVSP:          Normal Ranges: Peak TR Velocity:     1.90 m/s Est. RA Pressure:     3 mmHg RV Syst Pressure:     17 mmHg  (< 30mmHg) IVC Diam:             1.60 cm  PULMONIC VALVE:          Normal Ranges: PV Accel Time:  129 msec (>120ms) PV Max Cricket:     1.0 m/s  (0.6-0.9m/s) PV Max P.4 mmHg  Pulmonary Veins: PulmV A Revs Dur: 106.11 msec PulmV A Revs Cricket: 25.02 cm/s PulmV Kay Cricket:   14.21 cm/s PulmV S/D Cricket:    3.12 PulmV Sys Cricket:    44.36 cm/s  [id number] Zackery Galdamez DO Electronically signed on 2024 at 7:15:10 PM  ** Final **               Assessment/Plan   Assessment & Plan  VT (ventricular tachycardia) (Multi)    NSTEMI (non-ST elevated myocardial infarction) (Multi)    Wojciech Kern is a 74 y.o. male with PMHx of ICM/HFrEF (EF 26% 2024) s/p ICD (2018), CAD s/p HELEN x4 to RCA c/b VSD s/p CABG x1 (SVG-LAD and VSD repair in ), HTN, DLD, Emphysema, CKD3b  presenting as a transfer from [facility] ED to  CICU with ICD shocks and chest pain. Patient felt ICD fire x2 while shoveling snow and found to be in VT by EMS. Continue on lidocaine drip for possible VF per EP. Plan for ischemic eval.     Today:   -Renal function continues to improve, (Cr 1.92 from 2.74 on admission), baseline ~1.7  -Bradycardic today, still on lidocaine drip  -Plan for LHC today 1/8, follow up with interventional cardiologist for post-cath recommendations  ----Recommend 50cc/h LR for 8h  ----Remove R femoral sheath after ACT below threshold, then resume heparin     Neuro:  #Neuropathy  -Not currently on any home meds, consider lidocaine cream      Cardiac:  #VT s/p ICD shock x2  #NSEMI   #ICM/HFrEF (EF 26% 12/2024) s/p ICD (2018)  #CAD s/p HELEN x4 to RCA c/b VSD s/p CABG x1 (SVG-LAD and VSD repair in 2015)  #HTN #DLD  -Device interrogation: Showed wide complex tachycardia, with evidence of Ventricular Fibrillation  - Trop peak 9896  -EP consulted   -Continue lidocaine gtt @1, can consider discontinuing if bradycardia continues  -Maintain K>4, Mag>2  -Continue heparin gtt  -Continue ASA and statin  -Plavix load during cath, continue plavix  -Hold home lisinopril 10mg, spironolactone 12.5mg iso SPENCER; patient was not on entresto or empagliflozin 2/2 cost  - coreg previously discontinued 2/2 bradycardia   -Hold torsemide 50mg daily, pt appears euvolemic on exam, reassess vol status daily   -LHC completed today  -Continue IVF at 50 ml/h for 8h post cath     Pulm:  #Emphysema   -Continue Spiriva inhaler      GI:  -LORELEI     Renal:  #SPENCER on CKD3, improving  ::bl ~Cr 1.5-1.8  -Cr improved further today 2.8 -> 2.32 -> 1.9, s/p 2x 500 ml bolus  -Avoid nephrotoxins and really dose meds  - trend RFP     Heme/Onc  #Normocytic Anemia  ::bl Hgb 10-11, stable  -F/up anemia labs     F: PRN  E: K>4, Mag>2   N: cardiac diet  G: none  A: PIV  DVT: heparin gtt, resume after removal of R arterial sheath  Gtt: Heparin,  lidocaine  CODE: FULL (confirmed on admission)   NOK: Imelda (wife) [phone]          Patient was seen and discussed with attending Dr. Tony Smith MD  Internal Medicine and Pediatrics, PGY3

## 2025-01-08 NOTE — POST-PROCEDURE NOTE
Physician Transition of Care Summary  Invasive Cardiovascular Lab    Procedure Date: 1/8/2025  Attending:    * Noam Duron - Primary  Resident/Fellow/Other Assistant: Surgeons and Role:     * Kalyan Tomlinson MD - Fellow     * Oneil Dela Cruz MD - Fellow    Indications:   Pre-op Diagnosis      * VT (ventricular tachycardia) (Multi) [I47.20]     * NSTEMI (non-ST elevated myocardial infarction) (Multi) [I21.4]    Post-procedure diagnosis:   Post-op Diagnosis     * VT (ventricular tachycardia) (Multi) [I47.20]     * NSTEMI (non-ST elevated myocardial infarction) (Multi) [I21.4]    Procedure(s):   Left Heart Cath  65900 - TN L HRT CATH W/NJX L VENTRICULOGRAPHY IMG S&I    IVUS - Coronary  23982 - TN ENDOLUMINAL CORONARY IVUS OCT I&R INITIAL VESSEL    PCI ADAM Stent- Coronary    Left Heart Cath, No LV, With Grafts    Left Heart Cath, With LV, Angriography And Grafts        Procedure Findings:   Right femoral artery access    Right dominant system  LAD with competitive flow from SVG graft. No significant coronary artery disease in native LAD  Calcific proximal circumflex lesion with successful PCI  RCA- No significant coronary artery disease  Patient SVG-LAD graft      Post procedure- Continue LR at 50 ml/hr for 8 hours. Avoid ACE/ARB. Right femoral sheath sutured in place. Pull once ACT < 170.     DAPT- ASA + Plavix         Description of the Procedure:   As above    Complications:   None    Stents/Implants:   Implants       Stent    Stent, Arianna Ponce Adam, 3.50 X 18rx - [id number] - Implanted        Inventory item: STENT, ARIANNA FRONTIER ADAM, 3.50 X 18RX Model/Cat number: [id number]    : [employer] Lot number: [id number]    Device identifier: [id number]        GUDID Information       Request status Successful        Brand name: Arianna Ponce™ Version/Model: [id number]    Company name: [employer] MRI safety info as of 1/8/25: MR Conditional    Contains dry or latex rubber: No      GMDN P.T.  name: Drug-eluting coronary artery stent, non-bioabsorbable-polymer-coated                As of 1/8/2025       Status: Implanted                              Anticoagulation/Antiplatelet Plan:   ASA+ Plavix    Estimated Blood Loss:   25 mL    Anesthesia: Moderate Sedation Anesthesia Staff: No anesthesia staff entered.    Any Specimen(s) Removed:   No specimens collected during this procedure.    Disposition:   CICU      Electronically signed by: Kalyan Tomlinson MD, 1/8/2025 10:29 AM

## 2025-01-08 NOTE — CARE PLAN
The patient's goals for the shift include The clinical goals for the shift include Patient will remain HDS throughout the shift.      Problem: Safety - Adult  Goal: Free from fall injury  Outcome: Progressing     Problem: Discharge Planning  Goal: Discharge to home or other facility with appropriate resources  Outcome: Progressing     Problem: Chronic Conditions and Co-morbidities  Goal: Patient's chronic conditions and co-morbidity symptoms are monitored and maintained or improved  Outcome: Progressing     Problem: Fall/Injury  Goal: Not fall by end of shift  Outcome: Progressing  Goal: Be free from injury by end of the shift  Outcome: Progressing  Goal: Verbalize understanding of personal risk factors for fall in the hospital  Outcome: Progressing  Goal: Verbalize understanding of risk factor reduction measures to prevent injury from fall in the home  Outcome: Progressing  Goal: Use assistive devices by end of the shift  Outcome: Progressing  Goal: Pace activities to prevent fatigue by end of the shift  Outcome: Progressing     Problem: Skin  Goal: Decreased wound size/increased tissue granulation at next dressing change  Outcome: Progressing  Goal: Participates in plan/prevention/treatment measures  Outcome: Progressing  Goal: Prevent/manage excess moisture  Outcome: Progressing  Goal: Prevent/minimize sheer/friction injuries  Outcome: Progressing  Goal: Promote/optimize nutrition  Outcome: Progressing  Goal: Promote skin healing  Outcome: Progressing

## 2025-01-09 ENCOUNTER — PHARMACY VISIT (OUTPATIENT)
Dept: PHARMACY | Facility: CLINIC | Age: 75
End: 2025-01-09
Payer: COMMERCIAL

## 2025-01-09 VITALS
HEIGHT: 70 IN | TEMPERATURE: 97.3 F | BODY MASS INDEX: 22.19 KG/M2 | HEART RATE: 56 BPM | OXYGEN SATURATION: 100 % | WEIGHT: 155 LBS | RESPIRATION RATE: 20 BRPM | DIASTOLIC BLOOD PRESSURE: 66 MMHG | SYSTOLIC BLOOD PRESSURE: 116 MMHG

## 2025-01-09 LAB
ALBUMIN SERPL BCP-MCNC: 3.6 G/DL (ref 3.4–5)
ALBUMIN SERPL BCP-MCNC: 3.7 G/DL (ref 3.4–5)
ANION GAP SERPL CALC-SCNC: 13 MMOL/L (ref 10–20)
ANION GAP SERPL CALC-SCNC: 13 MMOL/L (ref 10–20)
ATRIAL RATE: 74 BPM
BASOPHILS # BLD AUTO: 0.03 X10*3/UL (ref 0–0.1)
BASOPHILS NFR BLD AUTO: 0.5 %
BUN SERPL-MCNC: 46 MG/DL (ref 6–23)
BUN SERPL-MCNC: 48 MG/DL (ref 6–23)
CALCIUM SERPL-MCNC: 8.9 MG/DL (ref 8.6–10.6)
CALCIUM SERPL-MCNC: 9 MG/DL (ref 8.6–10.6)
CHLORIDE SERPL-SCNC: 105 MMOL/L (ref 98–107)
CHLORIDE SERPL-SCNC: 105 MMOL/L (ref 98–107)
CO2 SERPL-SCNC: 26 MMOL/L (ref 21–32)
CO2 SERPL-SCNC: 26 MMOL/L (ref 21–32)
CREAT SERPL-MCNC: 1.76 MG/DL (ref 0.5–1.3)
CREAT SERPL-MCNC: 1.83 MG/DL (ref 0.5–1.3)
EGFRCR SERPLBLD CKD-EPI 2021: 38 ML/MIN/1.73M*2
EGFRCR SERPLBLD CKD-EPI 2021: 40 ML/MIN/1.73M*2
EOSINOPHIL # BLD AUTO: 0.12 X10*3/UL (ref 0–0.4)
EOSINOPHIL NFR BLD AUTO: 1.8 %
ERYTHROCYTE [DISTWIDTH] IN BLOOD BY AUTOMATED COUNT: 13.1 % (ref 11.5–14.5)
GLUCOSE SERPL-MCNC: 101 MG/DL (ref 74–99)
GLUCOSE SERPL-MCNC: 118 MG/DL (ref 74–99)
HCT VFR BLD AUTO: 27.5 % (ref 41–52)
HGB BLD-MCNC: 9.9 G/DL (ref 13.5–17.5)
IMM GRANULOCYTES # BLD AUTO: 0.02 X10*3/UL (ref 0–0.5)
IMM GRANULOCYTES NFR BLD AUTO: 0.3 % (ref 0–0.9)
LYMPHOCYTES # BLD AUTO: 1.3 X10*3/UL (ref 0.8–3)
LYMPHOCYTES NFR BLD AUTO: 19.6 %
MAGNESIUM SERPL-MCNC: 2.15 MG/DL (ref 1.6–2.4)
MAGNESIUM SERPL-MCNC: 2.19 MG/DL (ref 1.6–2.4)
MCH RBC QN AUTO: 32.2 PG (ref 26–34)
MCHC RBC AUTO-ENTMCNC: 36 G/DL (ref 32–36)
MCV RBC AUTO: 90 FL (ref 80–100)
MONOCYTES # BLD AUTO: 0.62 X10*3/UL (ref 0.05–0.8)
MONOCYTES NFR BLD AUTO: 9.3 %
NEUTROPHILS # BLD AUTO: 4.55 X10*3/UL (ref 1.6–5.5)
NEUTROPHILS NFR BLD AUTO: 68.5 %
NRBC BLD-RTO: 0 /100 WBCS (ref 0–0)
P AXIS: 28 DEGREES
P OFFSET: 191 MS
P ONSET: 132 MS
PHOSPHATE SERPL-MCNC: 3.7 MG/DL (ref 2.5–4.9)
PHOSPHATE SERPL-MCNC: 3.9 MG/DL (ref 2.5–4.9)
PLATELET # BLD AUTO: 162 X10*3/UL (ref 150–450)
POTASSIUM SERPL-SCNC: 4.4 MMOL/L (ref 3.5–5.3)
POTASSIUM SERPL-SCNC: 4.8 MMOL/L (ref 3.5–5.3)
PR INTERVAL: 170 MS
Q ONSET: 217 MS
QRS COUNT: 12 BEATS
QRS DURATION: 112 MS
QT INTERVAL: 454 MS
QTC CALCULATION(BAZETT): 503 MS
QTC FREDERICIA: 487 MS
R AXIS: 77 DEGREES
RBC # BLD AUTO: 3.07 X10*6/UL (ref 4.5–5.9)
SODIUM SERPL-SCNC: 139 MMOL/L (ref 136–145)
SODIUM SERPL-SCNC: 140 MMOL/L (ref 136–145)
T AXIS: 44 DEGREES
T OFFSET: 444 MS
VENTRICULAR RATE: 74 BPM
WBC # BLD AUTO: 6.6 X10*3/UL (ref 4.4–11.3)

## 2025-01-09 PROCEDURE — 99231 SBSQ HOSP IP/OBS SF/LOW 25: CPT | Performed by: INTERNAL MEDICINE

## 2025-01-09 PROCEDURE — 99239 HOSP IP/OBS DSCHRG MGMT >30: CPT

## 2025-01-09 PROCEDURE — RXMED WILLOW AMBULATORY MEDICATION CHARGE

## 2025-01-09 PROCEDURE — 2500000001 HC RX 250 WO HCPCS SELF ADMINISTERED DRUGS (ALT 637 FOR MEDICARE OP)

## 2025-01-09 PROCEDURE — 99231 SBSQ HOSP IP/OBS SF/LOW 25: CPT

## 2025-01-09 PROCEDURE — 80069 RENAL FUNCTION PANEL: CPT

## 2025-01-09 PROCEDURE — 85025 COMPLETE CBC W/AUTO DIFF WBC: CPT

## 2025-01-09 PROCEDURE — 36415 COLL VENOUS BLD VENIPUNCTURE: CPT

## 2025-01-09 PROCEDURE — 2500000002 HC RX 250 W HCPCS SELF ADMINISTERED DRUGS (ALT 637 FOR MEDICARE OP, ALT 636 FOR OP/ED)

## 2025-01-09 PROCEDURE — 97165 OT EVAL LOW COMPLEX 30 MIN: CPT | Mod: GO

## 2025-01-09 PROCEDURE — 83735 ASSAY OF MAGNESIUM: CPT

## 2025-01-09 RX ORDER — TORSEMIDE 10 MG/1
TABLET ORAL
Qty: 60 TABLET | Refills: 0 | Status: SHIPPED | OUTPATIENT
Start: 2025-01-09 | End: 2025-02-08

## 2025-01-09 RX ORDER — METOPROLOL SUCCINATE 50 MG/1
50 TABLET, EXTENDED RELEASE ORAL DAILY
Qty: 30 TABLET | Refills: 0 | Status: SHIPPED | OUTPATIENT
Start: 2025-01-10 | End: 2025-02-09

## 2025-01-09 RX ORDER — AMIODARONE HYDROCHLORIDE 200 MG/1
TABLET ORAL
Qty: 78 TABLET | Refills: 0 | Status: SHIPPED | OUTPATIENT
Start: 2025-01-09 | End: 2025-02-16

## 2025-01-09 RX ORDER — SPIRONOLACTONE 25 MG/1
25 TABLET ORAL DAILY
Status: DISCONTINUED | OUTPATIENT
Start: 2025-01-09 | End: 2025-01-09 | Stop reason: HOSPADM

## 2025-01-09 RX ORDER — METOPROLOL SUCCINATE 50 MG/1
50 TABLET, EXTENDED RELEASE ORAL DAILY
Status: DISCONTINUED | OUTPATIENT
Start: 2025-01-09 | End: 2025-01-09 | Stop reason: HOSPADM

## 2025-01-09 RX ORDER — SPIRONOLACTONE 25 MG/1
25 TABLET ORAL DAILY
Status: DISCONTINUED | OUTPATIENT
Start: 2025-01-09 | End: 2025-01-09

## 2025-01-09 RX ORDER — SPIRONOLACTONE 25 MG/1
25 TABLET ORAL DAILY
Qty: 30 TABLET | Refills: 0 | Status: SHIPPED | OUTPATIENT
Start: 2025-01-09 | End: 2025-02-08

## 2025-01-09 RX ORDER — AMIODARONE HYDROCHLORIDE 200 MG/1
400 TABLET ORAL 3 TIMES DAILY
Status: DISCONTINUED | OUTPATIENT
Start: 2025-01-09 | End: 2025-01-09

## 2025-01-09 RX ORDER — AMIODARONE HYDROCHLORIDE 200 MG/1
200 TABLET ORAL DAILY
Status: DISCONTINUED | OUTPATIENT
Start: 2025-01-18 | End: 2025-01-09

## 2025-01-09 RX ADMIN — TIOTROPIUM BROMIDE INHALATION SPRAY 2 PUFF: 3.12 SPRAY, METERED RESPIRATORY (INHALATION) at 08:20

## 2025-01-09 RX ADMIN — AMIODARONE HYDROCHLORIDE 400 MG: 200 TABLET ORAL at 08:42

## 2025-01-09 RX ADMIN — ASPIRIN 81 MG: 81 TABLET, COATED ORAL at 08:42

## 2025-01-09 RX ADMIN — METOPROLOL SUCCINATE 50 MG: 50 TABLET, EXTENDED RELEASE ORAL at 08:43

## 2025-01-09 RX ADMIN — SPIRONOLACTONE 25 MG: 25 TABLET, FILM COATED ORAL at 10:34

## 2025-01-09 RX ADMIN — ROSUVASTATIN CALCIUM 20 MG: 20 TABLET, FILM COATED ORAL at 08:51

## 2025-01-09 RX ADMIN — AMIODARONE HYDROCHLORIDE 400 MG: 200 TABLET ORAL at 15:00

## 2025-01-09 RX ADMIN — CLOPIDOGREL BISULFATE 75 MG: 75 TABLET ORAL at 08:43

## 2025-01-09 ASSESSMENT — PAIN - FUNCTIONAL ASSESSMENT
PAIN_FUNCTIONAL_ASSESSMENT: 0-10

## 2025-01-09 ASSESSMENT — PAIN SCALES - GENERAL
PAINLEVEL_OUTOF10: 0 - NO PAIN

## 2025-01-09 ASSESSMENT — COGNITIVE AND FUNCTIONAL STATUS - GENERAL
DRESSING REGULAR LOWER BODY CLOTHING: A LITTLE
DAILY ACTIVITIY SCORE: 21
HELP NEEDED FOR BATHING: A LITTLE
TOILETING: A LITTLE

## 2025-01-09 ASSESSMENT — ACTIVITIES OF DAILY LIVING (ADL)
ADL_ASSISTANCE: INDEPENDENT
BATHING_ASSISTANCE: STAND BY

## 2025-01-09 NOTE — PROGRESS NOTES
Social Work Progress Note  Patient is scheduled to be discharged to home today. Spouse will provide transportation. Patient signed IMM letter and received a copy. The original document was placed in patient's chart.    Rehab recommends Low intensity. Patient is in agreement to work with  Home Care. SW notified team resident for a referral to be placed. Rehab also recommended a wheeled walker. SW ordered walker through [facility] and delivered it to patient's room.  CAILIN HANNA

## 2025-01-09 NOTE — PROGRESS NOTES
Occupational Therapy    Evaluation    Patient Name: Wojciech Kern  MRN: [id number]  Today's Date: 1/9/2025  Room: 08/08  Time Calculation  Start Time: 0940  Stop Time: 0957  Time Calculation (min): 17 min    Assessment  IP OT Assessment  OT Assessment: Pt is a 73yo male presenting with deficits in ADLs, IADLs, bed mobility, transfers, functional activity tolerance and safety awareness. Pt would benefit from OT intervention during acute stay but likely does not require at DC  Prognosis: Good  Barriers to Discharge Home: Cognition needs  Cognition Needs: Insight of patient limited regarding functional ability/needs  Evaluation/Treatment Tolerance: Patient tolerated treatment well  Medical Staff Made Aware: Yes  End of Session Communication: Bedside nurse  End of Session Patient Position: Bed, 3 rail up, Alarm off, not on at start of session  Plan:  Inpatient Plan  Treatment Interventions: ADL retraining, Functional transfer training, UE strengthening/ROM, Endurance training, Cognitive reorientation, Patient/family training, Compensatory technique education  OT Frequency: 2 times per week  OT Discharge Recommendations: No OT needed after discharge  Equipment Recommended upon Discharge: Wheeled walker  OT Recommended Transfer Status:  (CGA)  OT - OK to Discharge: Yes  OT Assessment  OT Assessment Results: Decreased ADL status, Decreased cognition, Decreased functional mobility, Decreased IADLs, Decreased endurance, Decreased safe judgment during ADL  Prognosis: Good  Evaluation/Treatment Tolerance: Patient tolerated treatment well  Medical Staff Made Aware: Yes  Strengths: Ability to acquire knowledge, Capable of completing ADLs semi/independent, Housing layout, Living arrangement secure, Premorbid level of function, Support of Caregivers  Barriers to Participation: Insight into problems    Subjective   Current Problem:  1. VT (ventricular tachycardia) (Multi)  Cardiac device check - Inpatient    Cardiac device check -  Inpatient    Transthoracic Echo (TTE) Complete    Transthoracic Echo (TTE) Complete    Case Request Cath Lab: Left Heart Cath    Case Request Cath Lab: Left Heart Cath    Cardiac Catheterization Procedure    Cardiac Catheterization Procedure    spironolactone (Aldactone) 25 mg tablet    torsemide (Demadex) 10 mg tablet    amiodarone (Pacerone) 200 mg tablet    metoprolol succinate XL (Toprol-XL) 50 mg 24 hr tablet      2. ICD (implantable cardioverter-defibrillator) discharge  Cardiac device check - Inpatient    Cardiac device check - Inpatient      3. NSTEMI (non-ST elevated myocardial infarction) (Multi)  Transthoracic Echo (TTE) Complete    Transthoracic Echo (TTE) Complete    Case Request Cath Lab: Left Heart Cath    Case Request Cath Lab: Left Heart Cath    Cardiac Catheterization Procedure    Cardiac Catheterization Procedure    Referral to Home Care      4. Acute systolic (congestive) heart failure  spironolactone (Aldactone) 25 mg tablet    torsemide (Demadex) 10 mg tablet    amiodarone (Pacerone) 200 mg tablet    metoprolol succinate XL (Toprol-XL) 50 mg 24 hr tablet      5. Postural dizziness  Miscellaneous DME    Walker rolling        General:  Reason for Referral: presenting as a transfer from [facility] ED to  CICU with ICD shocks. s/p PCI 1/8  Past Medical History Relevant to Rehab: PMHx of ICM/HFrEF (EF 26% 12/2024) s/p ICD (2018), CAD s/p HELEN x4 to RCA c/b VSD s/p CABG x1 (SVG-LAD and VSD repair in 2015), HTN, DLD, Emphysema, CKD3b  Prior to Session Communication: Bedside nurse  Patient Position Received: Bed, 3 rail up, Alarm off, not on at start of session  General Comment: Pt supine in bed upon entry to room, RN reports pt is currently still on bed  rest but is able to get up to use bathroom and return to be. Pt eager to get to toiolet, cues for safety 2/2 impulsivity. Pt groin site intact pre/post session   Precautions:  Hearing/Visual Limitations: Craig; does not have his hearing aids with  him  Medical Precautions: Cardiac precautions, Fall precautions  Precautions Comment: MAP>65  Vital Signs:  Vital Signs (Past 2hrs)        Date/Time Vitals Session Patient Position Pulse Resp SpO2 BP MAP (mmHg)    01/09/25 0940 Pre OT  --  77  21  100 %  113/70  --     01/09/25 0957 Post OT  --  86  12  100 %  127/62  80                   Pain:  Pain Assessment  Pain Assessment: 0-10  0-10 (Numeric) Pain Score: 0 - No pain  Lines/Tubes/Drains:  External Urinary Catheter (Active)   Number of days: 0         Objective   Cognition:  Overall Cognitive Status: Within Functional Limits  Orientation Level: Oriented X4  Cognition Comments: cues for safety awareness, attempting to get out of bed with blankets wrapped around legs and lines tangled, requires cues for safety with management  Impulsive: Mildly           Home Living:  Type of Home: House  Lives With: Spouse  Home Adaptive Equipment: Cane  Home Layout: One level, Laundry in basement  Home Access: Stairs to enter with rails  Entrance Stairs-Rails: Right  Bathroom Shower/Tub: Tub/shower unit  Bathroom Toilet: Standard  Bathroom Equipment: None   Prior Function:  Level of [place]: Independent with ADLs and functional transfers, Independent with homemaking with ambulation  Receives Help From: Family  ADL Assistance: Independent  Homemaking Assistance: Independent  Ambulatory Assistance: Independent (occasionanl cane use)  Vocational: Retired  Leisure: reading, walks 1 mile every day at rec center  Hand Dominance: Right  Prior Function Comments: Drives, hx of frequent falls- pt reported fall the day prior to admission; able to get up by himself  IADL History:     ADL:  Eating Assistance: Independent (anticipated)  Grooming Assistance: Independent (anticipated)  Bathing Assistance: Stand by (anticipated)  UE Dressing Assistance: Independent (anticipated)  LE Dressing Assistance: Stand by (anticipated)  Toileting Assistance with Device: Stand by  Activity  Tolerance:  Endurance: Tolerates 10 - 20 min exercise with multiple rests  Early Mobility/Exercise Safety Screen: Proceed with mobilization - No exclusion criteria met  Activity Tolerance Comments: VSS stable throughout session  Balance:     Bed Mobility/Transfers: Bed Mobility  Bed Mobility: Yes  Bed Mobility 1  Bed Mobility 1: Supine to sitting  Level of Assistance 1: Close supervision  Bed Mobility Comments 1: SBA for safety 2/2 impulsivity  Bed Mobility 2  Bed Mobility  2: Sitting to supine  Level of Assistance 2: Close supervision  Bed Mobility Comments 2: requires HOB elevated  Functional Mobility  Functional Mobility Performed: Yes  Functional Mobility 1  Device 1: No device  Assistance 1: Hand held assistance, Contact guard  Comments 1: functional mobility in room with HHA and CGA bed<>toilet   and Transfers  Transfer: Yes  Transfer 1  Transfer From 1: Bed to  Transfer to 1: Stand  Technique 1: Sit to stand  Transfer Level of Assistance 1: Contact guard  Transfers 2  Transfer From 2: Stand to  Transfer to 2: Toilet  Technique 2: Stand to sit  Transfer Level of Assistance 2: Contact guard  Transfers 3  Transfer From 3: Toilet to  Transfer to 3: Stand  Technique 3: Sit to stand  Transfer Level of Assistance 3: Hand held assistance, Contact guard  Transfers 4  Transfer From 4: Stand to  Transfer to 4: Bed  Technique 4: Stand to sit  Transfer Level of Assistance 4: Contact guard  IADL's:      Vision: Vision - Basic Assessment  Current Vision: No visual deficits   and    Sensation:  Light Touch: No apparent deficits  Strength:  Strength Comments: GLORIA WFL assessed functionally  Perception:  Inattention/Neglect: Appears intact  Initiation: Appears intact  Coordination:  Movements are Fluid and Coordinated: Yes   Hand Function:  Hand Function  Gross Grasp: Functional  Extremities: RUE   RUE : Within Functional Limits, LUE   LUE: Within Functional Limits,  , and      Outcome Measures: [facility] Daily Activity  Putting on  and taking off regular lower body clothing: A little  Bathing (including washing, rinsing, drying): A little  Putting on and taking off regular upper body clothing: None  Toileting, which includes using toilet, bedpan or urinal: A little  Taking care of personal grooming such as brushing teeth: None  Eating Meals: None  Daily Activity - Total Score: 21    Confusion Assessment Method-ICU (CAM-ICU)  Feature 1: Acute Onset or Fluctuating Course: Negative  Feature 2: Inattention: Negative  Feature 4: Disorganized Thinking: Negative  Overall CAM-ICU: Negative   ICU Mobility Screen  Early Mobility/Exercise Safety Screen: Proceed with mobilization - No exclusion criteria met  ICU Mobility Scale: Walking with assistance of 1 person,          Education Documentation  Body Mechanics, taught by Clara Asher OT at 1/9/2025 11:38 AM.  Learner: Patient  Readiness: Acceptance  Method: Explanation  Response: Verbalizes Understanding  Comment: OT POC, safety preecautions    Precautions, taught by Clara Asher OT at 1/9/2025 11:38 AM.  Learner: Patient  Readiness: Acceptance  Method: Explanation  Response: Verbalizes Understanding  Comment: OT POC, safety preecautions    ADL Training, taught by Clara Asher OT at 1/9/2025 11:38 AM.  Learner: Patient  Readiness: Acceptance  Method: Explanation  Response: Verbalizes Understanding  Comment: OT POC, safety preecautions    Education Comments  No comments found.        Goals:     Encounter Problems       Encounter Problems (Active)       ADLs       Pt will complete all ADLs and simulated IADLs with IND with LRD        Start:  01/09/25    Expected End:  01/30/25               COGNITION/SAFETY       Pt will complete ADLS, bed mobility and functional mobility with good safety awareness with no external cueing       Start:  01/09/25    Expected End:  01/30/25               EXERCISE/STRENGTHENING       Pt will demonstrate I with energy conservation techniques to increase functional  activity tolerance to x25+ min without rest breaks/while maintaining O2 sats.        Start:  01/09/25    Expected End:  01/30/25               TRANSFERS       Patient will complete all functional transfers to  with least restrictive device with modified independent level of assistance.       Start:  01/09/25    Expected End:  01/30/25 01/09/25 at 11:38 AM   Clara Asher OT   Rehab Office: [phone]

## 2025-01-09 NOTE — PROGRESS NOTES
Subjective Data:  Patient seen and assessed this morning, sitting up in chair, NAD. Denies any CP or SOB, comfortable on RA. Per patient he is to be discharged today.    Overnight Events:    No acute events overnight      Objective Data:  Last Recorded Vitals:  Vitals:    01/09/25 0732 01/09/25 0940 01/09/25 0957 01/09/25 1117   BP:  113/70 127/62    Pulse:  77 86    Resp:  21 12    Temp: 37 °C (98.6 °F)   35.8 °C (96.4 °F)   TempSrc: Temporal   Temporal   SpO2:  100% 100%    Weight:       Height:           Last Labs:  CBC - 1/9/2025:  2:20 AM  6.6 9.9 162    27.5      CMP - 1/9/2025:  3:36 AM  9.0 7.4 27 --- 0.6   3.7 3.7 17 48      PTT - 1/5/2025: 12:42 PM  1.1   12.9 22     TROPHS   Date/Time Value Ref Range Status   01/06/2025 02:44 AM 8,369 0 - 53 ng/L Final     Comment:     Previous result verified on 1/6/2025 0002 on specimen/case [id number] called with component TRPHS for procedure Troponin I, High Sensitivity with value 9,896 ng/L.   01/05/2025 11:17 PM 9,896 0 - 53 ng/L Final   01/05/2025 06:36 PM 5,744 0 - 20 ng/L Final     Comment:     Previous result verified on 1/5/2025 1356 on specimen/case [id number] called with component TRPHS for procedure Troponin I, High Sensitivity, Initial with value 89 ng/L.   01/05/2025 05:12 PM 4,620 0 - 20 ng/L Final     Comment:     Previous result verified on 1/5/2025 1356 on specimen/case [id number] called with component TRPHS for procedure Troponin I, High Sensitivity, Initial with value 89 ng/L.   01/05/2025 03:53 PM 3,507 0 - 20 ng/L Final     Comment:     Previous result verified on 1/5/2025 1356 on specimen/case [id number] called with component TRPHS for procedure Troponin I, High Sensitivity, Initial with value 89 ng/L.     BNP   Date/Time Value Ref Range Status   01/06/2025 05:08  0 - 99 pg/mL Final   10/08/2024 03:15  0 - 99 pg/mL Final     LDLCALC   Date/Time Value Ref Range Status   01/08/2025 05:01 AM 45 <=99 mg/dL Final      Comment:                                 Near   Borderline      AGE      Desirable  Optimal    High     High     Very High     0-19 Y     0 - 109     ---    110-129   >/= 130     ----    20-24 Y     0 - 119     ---    120-159   >/= 160     ----      >24 Y     0 -  99   100-129  130-159   160-189     >/=190     07/11/2024 10:24 AM 47 <=99 mg/dL Final     Comment:                                 Near   Borderline      AGE      Desirable  Optimal    High     High     Very High     0-19 Y     0 - 109     ---    110-129   >/= 130     ----    20-24 Y     0 - 119     ---    120-159   >/= 160     ----      >24 Y     0 -  99   100-129  130-159   160-189     >/=190       VLDL   Date/Time Value Ref Range Status   01/08/2025 05:01 AM 7 0 - 40 mg/dL Final   07/11/2024 10:24 AM 16 0 - 40 mg/dL Final   06/21/2023 09:16 AM 9 0 - 40 mg/dL Final   06/17/2022 09:00 AM 10 0 - 40 mg/dL Final   09/03/2021 10:07 AM 10 0 - 40 mg/dL Final      Last I/O:  I/O last 3 completed shifts:  In: 1448.1 (20.6 mL/kg) [P.O.:120; I.V.:828.1 (11.8 mL/kg); IV Piggyback:500]  Out: 2425 (34.5 mL/kg) [Urine:2400 (0.9 mL/kg/hr); Blood:25]  Weight: 70.3 kg     Past Cardiology Tests (Last 3 Years):  EKG:  Electrocardiogram, 12-lead PRN ACS symptoms 01/08/2025      Electrocardiogram 12 Lead 01/08/2025 (Preliminary)      Electrocardiogram, 12-lead PRN ACS symptoms 01/06/2025 (Preliminary)      Electrocardiogram, 12-lead PRN ACS symptoms 01/05/2025 (Preliminary)      ECG 12 lead 01/05/2025 (Preliminary)      ECG 12 lead 01/05/2025 (Preliminary)      ECG 12 lead 01/05/2025 (Preliminary)      ECG 12 lead (Clinic Performed) 10/15/2024      ECG 12 Lead 10/08/2024      Electrocardiogram, 12-lead PRN ACS symptoms 09/29/2024      ECG 12 lead (Clinic Performed) 05/07/2024      ECG 12 Lead 02/18/2024    Echo:  Transthoracic Echo (TTE) Complete 01/06/2025      Transthoracic echo (TTE) complete 12/10/2024      Transthoracic echo (TTE) complete 11/07/2023    Ejection  Fractions:  EF   Date/Time Value Ref Range Status   01/06/2025 04:56 PM 28 %    12/10/2024 02:57 PM 26 %    11/07/2023 12:27 PM 29       Cath:  Cardiac Catheterization Procedure 01/08/2025    Stress Test:  No results found for this or any previous visit from the past 1095 days.    Cardiac Imaging:  No results found for this or any previous visit from the past 1095 days.      Inpatient Medications:  Scheduled medications   Medication Dose Route Frequency    amiodarone  400 mg oral TID    Followed by    [START ON 1/18/2025] amiodarone  200 mg oral Daily    aspirin  81 mg oral Daily    clopidogrel  75 mg oral Daily    [Held by provider] lisinopril  10 mg oral Daily    metoprolol succinate XL  50 mg oral Daily    rosuvastatin  20 mg oral Daily    spironolactone  25 mg oral Daily    tiotropium  2 puff inhalation Daily    [Held by provider] torsemide  50 mg oral Daily     PRN medications   Medication     Continuous Medications   Medication Dose Last Rate       Physical Exam:  General: Awake, alert, in no acute distress, sitting up in chair  HEENT: Normocephalic, atraumatic  CV: Heart with regular rate and rhythm, no murmurs appreciated, no JVD neck veins appear flat  Lungs: no increased work of breathing, on RA  GI: Soft, nontender, nondistended  Extremities: 2+ pulses bilaterally, no lower extremity edema, Rt groin incision, no bleeding or hematoma, dressing c/d/I.  Neuro: Moves all extremities equally, strength 5/5 bilaterally     Assessment/Plan   Wojciech Kern is a 74 y.o. male with PMHx of ICM/HFrEF (EF 26% 12/2024) s/p ICD (2018), CAD s/p HELEN x4 to RCA c/b VSD s/p CABG x1 (SVG-LAD and VSD repair in 2015), HTN, DLD, Emphysema, CKD3b presenting as a transfer from [facility] ED to  CICU with ICD shocks. Patient felt ICD fire x2 while shoveling snow and found to be in VT by EMS. Continue on lidocaine drip for possible VF per EP. Plan for ischemic eval, deferred 1/7 2/2 renal function.     1/8  - RFA, sheath left in place,  pull once ACT < 170  - No sig CAD in native LAD/RCA, calcific prox Circ lesion s/p successful PCI. Patient SVG-LAD graft  - LR at 50 mL/hr for 8 hrs, avoid ACE/ARB & Lasix tonight  - IV 40 lasix stat given in the lab, tachycardic likely ischemia induced decomp leading to flash pulmonary edema   - LVEDP 40mmHg, IC nitroglycerin 200mcg, reduced the LVEDP to 22mmHg  - Plavix loaded in the lab 600 mg, cont DAPT (ASA/Plavix)    1/9  - Rt groin incision, no bleeding or hematoma, dressing c/d/i  - remains on DAPT ASA/Plavix  - Per patient he is to be discharged today.    Interventional Recs:   - cont telemetry care per PCI protocol  - OK to discharge from interventional perspective   - cont DAPT with aspirin and Plavix  - education on compliance with DAPT provided  - at discharge, PLEASE send 90 day prescription of Brilinta to [facility] and remaining refills to patient's home pharmacy   - continue high intensity statin, BB (or document contraindications for use)  - right groin no bleeding/hematoma, dressing c/d/i   - please ensure cardiology follow up appointment after discharge in 1-3 weeks  - patient referred for cardiac rehab evaluation  - no lifting anything heavier than 10 lbs for one week for groin access, post-cath instructions added to AVS    Interventional Cardiology will continue to follow while patient remains in house.      Interventional Cardiology Fellow Pager: [phone] (7AM-5PM) Night coverage: [facility] Cross Cover [phone]    SHANNON Holguin-CNP   Interventional Cardiology     Code Status:  Full Code    I spent 30 minutes in the professional and overall care of this patient.

## 2025-01-09 NOTE — DISCHARGE INSTRUCTIONS
You were admitted because your device shocked you. You were found to have an abnormal rhythm called Ventricular Tachycardia. We found out that you had a heart attack, called a non-ST Elevation myocardial infarction. You had a catheterization procedure on 1/8 that opened up a blockage in your heart. You recovered well after this.     You will start taking amiodarone again so that your heart rate does not go too fast. You will also take metoprolol that will help with this.  You will increase your spironolactone dose.  You had a small kidney injury when you were admitted, please decrease the amount of diuretic (water pill, torsemide) taht you take to 20mg daily. Take an extra 20mg if you gain 3 pounds in 3 days. Please weigh yourself daily.

## 2025-01-09 NOTE — PROGRESS NOTES
"Subjective   NAOE. Telemetry w/ sb. No vt.      Objective   Visit Vitals  /88   Pulse 56   Temp 35.8 °C (96.4 °F) (Temporal)   Resp 26   Ht 1.778 m (5' 10\")   Wt 70.3 kg (155 lb)   SpO2 100%   BMI 22.24 kg/m²   Smoking Status Former   BSA 1.86 m²      Physical Exam  General: awake, alert, no acute distress  HEENT: no scleral icterus, no JVD  CV: RRR, no MRG  Resp: CTA b/l, no wheezes, rales, or rhonchi  Abd: soft, NT/ND  LE: no edema, no cyanosis  Neuo: grossly normal  Psych: pleasant      Results for orders placed during the hospital encounter of 01/05/25    Transthoracic Echo (TTE) Complete    Narrative  [facility], [address] [place] [address]  Tel [phone] and Fax [phone]    TRANSTHORACIC ECHOCARDIOGRAM REPORT      Patient Name:       DANICA Mccall Physician:    [id number] Cl Esteban MD  Study Date:         1/6/2025            Ordering Provider:    [id number] CARL B GILLOMBARDO  MRN/PID:            [id number]            Fellow:               [id number] Kassy Field MD  Accession#:         [id number]        Nurse:  Date of Birth/Age:  1950 / 74 years Sonographer:          Jana Roger RDCS  Gender assigned at  M                   Additional Staff:  Birth:  Height:             177.80 cm           Admit Date:           1/5/2025  Weight:             66.23 kg            Admission Status:     Inpatient -  Routine  BSA / BMI:          1.83 m2 / 20.95     Encounter#:           [id number]  kg/m2  Blood Pressure:     105/59 mmHg         Department Location:  [facility]    Study Type:    TRANSTHORACIC ECHO (TTE) COMPLETE  Diagnosis/ICD: Ventricular tachycardia, unspecified-I47.20; Non ST elevation  (NSTEMI) myocardial infarction-I21.4  Indication:    NSTEMI, VT  CPT Code:      Echo Complete w Full Doppler-[id number]    Patient History:  Pertinent History: ICM/HFrEF (EF 26% 12/2024) s/p sqICD (2018), CAD s/p HELEN x4  to RCA c/b VSD s/p CABG x1 (SVG-LAD and VSD repair in " 2015),  HTN, COPD, CKD3b.    Study Detail: The following Echo studies were performed: 2D, M-Mode, Doppler and  color flow. Definity used as a contrast agent for endocardial  border definition. Total contrast used for this procedure was 4 mL  via IV push.      PHYSICIAN INTERPRETATION:  Left Ventricle: Left ventricular ejection fraction is severely decreased, by visual estimate at 25-30%. There is mild concentric left ventricular hypertrophy. Including the akinetic aneurysmalThere are multiple left ventricular wall motion abnormalities. The left ventricular cavity size is severely dilated. There is normal septal and mildly increased posterior left ventricular wall thickness. Abnormal (paradoxical) septal motion consistent with post-operative status and abnormal (paradoxical) septal motion, consistent with an intraventricular conduction delay. Spectral Doppler shows a Grade I (impaired relaxation pattern) of left ventricular diastolic filling with normal left atrial filling pressure. 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 There is involvement in the basal inferior wall. There is also hypo- to akinesia of the lateral wall and the septal-apical segments. Including the akinetic inferolateal aneurysm, the LV EF is ~ 15-20%.  Left Atrium: The left atrium is severely dilated. There is no evidence of a patent foramen ovale. A bubble study using agitated saline was performed. Bubble study is negative.  Right Ventricle: The right ventricle is normal in size. There is reduced right ventricular systolic function. TAPSE= 14.6 mm; RV S'= 5 cm/s.  Right Atrium: The right atrium is normal in size.  Aortic Valve: The aortic valve is trileaflet. The aortic valve dimensionless index is 0.66. There is no evidence of aortic valve regurgitation. The peak instantaneous gradient of the aortic valve is 6 mmHg. The mean gradient of the aortic valve is 3 mmHg.  Mitral Valve: The mitral valve is mildly thickened.  There is mild mitral valve regurgitation.  Tricuspid Valve: The tricuspid valve is structurally normal. There is trace tricuspid regurgitation. The right ventricular systolic pressure is unable to be estimated.  Pulmonic Valve: The pulmonic valve is not well visualized. There is trace pulmonic valve regurgitation.  Pericardium: There is no pericardial effusion noted.  Aorta: The aortic root is abnormal. There is mild dilatation of the aortic root.  Systemic Veins: The inferior vena cava appears normal in size.  In comparison to the previous echocardiogram(s): Compared with study dated 12/10/2024, no significant change.      CONCLUSIONS:  1. Left ventricular ejection fraction is severely decreased, by visual estimate at 25-30%.  2. Spectral Doppler shows a Grade I (impaired relaxation pattern) of left ventricular diastolic filling with normal left atrial filling pressure.  3. Left ventricular cavity size is severely dilated.  4. Abnormal septal motion consistent with post-operative status and abnormal septal motion, consistent with intraventricular conduction delay.  5. 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 There is involvement in the basal inferior wall. There is also hypo- to akinesia of the lateral wall and the septal-apical segments. Including the akinetic inferolateal aneurysm, the LV EF is ~ 15-20%.  6. There is reduced right ventricular systolic function.  7. TAPSE= 14.6 mm; RV S'= 5 cm/s.  8. The left atrium is severely dilated.  9. There is no evidence of a patent foramen ovale.    QUANTITATIVE DATA SUMMARY:    2D MEASUREMENTS:          Normal Ranges:  Ao Root d:       3.70 cm  (2.0-3.7cm)  LAs:             5.50 cm  (2.7-4.0cm)  IVSd:            0.92 cm  (0.6-1.1cm)  LVPWd:           1.21 cm  (0.6-1.1cm)  LVIDd:           5.47 cm  (3.9-5.9cm)  LVIDs:           5.00 cm  LV Mass Index:   126 g/m2  LV % FS          8.6 %      LA VOLUME:                    Normal Ranges:  LA Vol  A4C:        159.2 ml   (22+/-6mL/m2)  LA Vol A2C:        112.8 ml  LA Vol BP:         139.7 ml  LA Vol Index A4C:  87.2ml/m2  LA Vol Index A2C:  61.8 ml/m2  LA Vol Index BP:   76.5 ml/m2  LA Area A4C:       36.9 cm2  LA Area A2C:       29.8 cm2  LA Major Axis A4C: 7.3 cm  LA Major Axis A2C: 6.7 cm  LA Volume Index:   76.5 ml/m2      RA VOLUME BY A/L METHOD:          Normal Ranges:  RA Area A4C:             16.9 cm2      AORTA MEASUREMENTS:         Normal Ranges:  Asc Ao, d:          3.30 cm (2.1-3.4cm)      LV SYSTOLIC FUNCTION BY 2D PLANIMETRY (MOD):  Normal Ranges:  EF-A4C View:    35 % (>=55%)  EF-Visual:      28 %  LV EF Reported: 28 %      LV DIASTOLIC FUNCTION:             Normal Ranges:  MV Peak E:             0.51 m/s    (0.7-1.2 m/s)  MV Peak A:             0.93 m/s    (0.42-0.7 m/s)  E/A Ratio:             0.55        (1.0-2.2)  MV e'                  0.047 m/s   (>8.0)  MV lateral e'          0.06 m/s  MV medial e'           0.03 m/s  MV A Dur:              127.00 msec  E/e' Ratio:            10.87       (<8.0)  a'                     0.06 m/s  MV DT:                 260 msec    (150-240 msec)  PulmV Sys Cricket:         36.00 cm/s  PulmV Kay Cricket:        22.90 cm/s  PulmV S/D Cricket:         1.60  PulmV A Revs Cricket:      25.70 cm/s  PulmV A Revs Dur:      130.00 msec      MITRAL VALVE:          Normal Ranges:  MV DT:        260 msec (150-240msec)      MITRAL INSUFFICIENCY:             Normal Ranges:  MR VTI:               176.00 cm  MR Vmax:              498.00 cm/s      AORTIC VALVE:                     Normal Ranges:  AoV Vmax:                1.26 m/s (<=1.7m/s)  AoV Peak P.4 mmHg (<20mmHg)  AoV Mean PG:             3.0 mmHg (1.7-11.5mmHg)  LVOT Max Cricket:            1.02 m/s (<=1.1m/s)  AoV VTI:                 24.80 cm (18-25cm)  LVOT VTI:                16.40 cm  LVOT Diameter:           2.00 cm  (1.8-2.4cm)  AoV Area, VTI:           2.08 cm2 (2.5-5.5cm2)  AoV Area,Vmax:           2.54 cm2  (2.5-4.5cm2)  AoV Dimensionless Index: 0.66      RIGHT VENTRICLE:  RV Basal 3.60 cm  RV Mid   2.80 cm  RV Major 8.1 cm  TAPSE:   14.6 mm  RV s'    0.05 m/s      TRICUSPID VALVE/RVSP:         Normal Ranges:  IVC Diam:             1.50 cm      PULMONIC VALVE:          Normal Ranges:  PV Accel Time:  120 msec (>120ms)  PV Max Cricket:     0.9 m/s  (0.6-0.9m/s)  PV Max PG:      3.6 mmHg      Pulmonary Veins:  PulmV A Revs Dur: 130.00 msec  PulmV A Revs Cricket: 25.70 cm/s  PulmV Kay Cricket:   22.90 cm/s  PulmV S/D Cricket:    1.60  PulmV Sys Cricket:    36.00 cm/s      [id number] Cl Esteban MD  Electronically signed on 1/6/2025 at 6:49:21 PM        ** Final **       Imaging  Chest x-ray: @CXR@    Lab Review   Lab Results   Component Value Date     01/09/2025    K 4.4 01/09/2025     01/09/2025    CO2 26 01/09/2025    BUN 46 (H) 01/09/2025    CREATININE 1.83 (H) 01/09/2025    GLUCOSE 101 (H) 01/09/2025    CALCIUM 9.0 01/09/2025     Lab Results   Component Value Date    WBC 6.6 01/09/2025    HGB 9.9 (L) 01/09/2025    HCT 27.5 (L) 01/09/2025    MCV 90 01/09/2025     01/09/2025       Troponin I, High Sensitivity   Date/Time Value Ref Range Status   01/05/2025 06:36 PM 5,744 (HH) 0 - 20 ng/L Final     Comment:     Previous result verified on 1/5/2025 1356 on specimen/case [id number] called with component TRPHS for procedure Troponin I, High Sensitivity, Initial with value 89 ng/L.   01/05/2025 05:12 PM 4,620 (HH) 0 - 20 ng/L Final     Comment:     Previous result verified on 1/5/2025 1356 on specimen/case [id number] called with component TRPHS for procedure Troponin I, High Sensitivity, Initial with value 89 ng/L.   01/05/2025 03:53 PM 3,507 () 0 - 20 ng/L Final     Comment:     Previous result verified on 1/5/2025 1356 on specimen/case [id number] called with component [facility] for procedure Troponin I, High Sensitivity, Initial with value 89 ng/L.     Troponin I, High Sensitivity ([facility])   Date/Time Value Ref Range  Status   01/06/2025 02:44 AM 8,369 (HH) 0 - 53 ng/L Final     Comment:     Previous result verified on 1/6/2025 0002 on specimen/case [id number] called with component [facility] for procedure Troponin I, High Sensitivity with value 9,896 ng/L.   01/05/2025 11:17 PM 9,896 (HH) 0 - 53 ng/L Final     BNP   Date/Time Value Ref Range Status   01/06/2025 05:08  (H) 0 - 99 pg/mL Final   10/08/2024 03:15  (H) 0 - 99 pg/mL Final   02/21/2024 05:59 AM 1,257 (H) 0 - 99 pg/mL Final     LDL   Date/Time Value Ref Range Status   06/21/2023 09:16 AM 52 0 - 99 mg/dL Final     Comment:     .                           NEAR      BORD      AGE      DESIRABLE  OPTIMAL    HIGH     HIGH     VERY HIGH     0-19 Y     0 - 109     ---    110-129   >/= 130     ----    20-24 Y     0 - 119     ---    120-159   >/= 160     ----      >24 Y     0 -  99   100-129  130-159   160-189     >/=190  .   06/17/2022 09:00 AM 46 0 - 99 mg/dL Final     Comment:     .                           NEAR      BORD      AGE      DESIRABLE  OPTIMAL    HIGH     HIGH     VERY HIGH     0-19 Y     0 - 109     ---    110-129   >/= 130     ----    20-24 Y     0 - 119     ---    120-159   >/= 160     ----      >24 Y     0 -  99   100-129  130-159   160-189     >/=190  .       Triglycerides   Date/Time Value Ref Range Status   01/08/2025 05:01 AM 33 0 - 149 mg/dL Final     Comment:     Age              Desirable        Borderline         High        Very High  SEX:B           mg/dL             mg/dL               mg/dL      mg/dL  <=14D                       ----               ----        ----  15D-365D                    ----               ----        ----  1Y-9Y           0-74               75-99             >=100       ----  10Y-19Y        0-89                            >=130       ----  20Y-24Y        0-114             115-149             >=150      ----  >= 25Y         0-149             150-199             200-499    >=500      Venipuncture  immediately after or during the administration of Metamizole may lead to falsely low results. Testing should be performed immediately prior to Metamizole dosing.   07/11/2024 10:24 AM 79 0 - 149 mg/dL Final     Comment:        Age         Desirable   Borderline High   High     Very High   0 D-90 D    19 - 174         ----         ----        ----  91 D- 9 Y     0 -  74        75 -  99     >/= 100      ----    10-19 Y     0 -  89        90 - 129     >/= 130      ----    20-24 Y     0 - 114       115 - 149     >/= 150      ----         >24 Y     0 - 149       150 - 199    200- 499    >/= 500    Venipuncture immediately after or during the administration of Metamizole may lead to falsely low results. Testing should be performed immediately prior to Metamizole dosing.          Assessment/Plan   74 y.o. male w/ ICM/HFrEF (EF 26% 12/2024) s/p sqICD (2018), CAD s/p HELEN x4 to RCA c/b VSD s/p CABG x1 (SVG-LAD and VSD repair in 2015), HTN, COPD, CKD3b [facility]. EP is consulted for the same. Device interrogation and EMS ECG demonstrate wide complex regular tachycardia that starts at a rate of approx 150bpm, but accelerates later with a change in morphology concerning for VF. Suspect this is in the setting of NSTEMI given concomitant chest pain and history of significant CAD. He underwent PCI of the ostial LCx on 1/8. Ability to uptitrate beta blocker limited due to bradycardia.      Impression:  #VF/VT  #NSTEMI     Recommendations:  -Lidocaine discontinued 1/8.  We would not recommend mexiletine start. No amio.   -Observe HR for recovery and uptitrate beta blocker as tolerated.    Please have the patient follow-up with EP Dr. Corey Peña 4 weeks post discharge.    Thank you for the opportunity to contribute to the care of this patient. We will sign off. Above recommendations discussed with Dr. Britton. If further questions arise, please page the EP consult pager at [phone]  on weekdays 7AM - 6PM and weekends 7AM - 2PM, or at [phone] at all other times. The EP device nurse can be reached at pager [phone] during regular business hours LIZ Pena MD  Fellow, Electrophysiology

## 2025-01-10 ENCOUNTER — PATIENT OUTREACH (OUTPATIENT)
Dept: PRIMARY CARE | Facility: CLINIC | Age: 75
End: 2025-01-10
Payer: MEDICARE

## 2025-01-10 ENCOUNTER — APPOINTMENT (OUTPATIENT)
Dept: CARDIOLOGY | Facility: HOSPITAL | Age: 75
End: 2025-01-10
Payer: MEDICARE

## 2025-01-10 ENCOUNTER — DOCUMENTATION (OUTPATIENT)
Dept: HOME HEALTH SERVICES | Facility: HOME HEALTH | Age: 75
End: 2025-01-10
Payer: MEDICARE

## 2025-01-10 ENCOUNTER — HOME HEALTH ADMISSION (OUTPATIENT)
Dept: HOME HEALTH SERVICES | Facility: HOME HEALTH | Age: 75
End: 2025-01-10
Payer: MEDICARE

## 2025-01-10 NOTE — HH CARE COORDINATION
Home Care received a Referral for Physical Therapy and Occupational Therapy. We have processed the referral for a Start of Care on 24-48 HOURS .     If you have any questions or concerns, please feel free to contact us at [phone]. Follow the prompts, enter your five digit zip code, and you will be directed to your care team on CENTL 2.

## 2025-01-10 NOTE — DISCHARGE SUMMARY
Discharge Diagnosis  VT (ventricular tachycardia) (Multi)    Issues Requiring Follow-Up  EP Follow up  -Dr. Corey Peña in 1 month recommended    Cardiology follow up    Other:  -Torsemide dose decreased to 20mg daily as patient had SPENCER on admission and was euvolemic/hypovolemic during admission    Discharge Meds     Medication List      START taking these medications     amiodarone 200 mg tablet; Commonly known as: Pacerone; Take 2 tablets   (400 mg) by mouth 3 times a day for 8 days, THEN 1 tablet (200 mg) once   daily.; Start taking on: January 9, 2025   Jardiance 10 mg; Generic drug: empagliflozin; Take 1 tablet (10 mg) by   mouth once daily.   metoprolol succinate XL 50 mg 24 hr tablet; Commonly known as:   Toprol-XL; Take 1 tablet (50 mg) by mouth once daily. Do not crush or   chew.; Start taking on: January 10, 2025     CHANGE how you take these medications     spironolactone 25 mg tablet; Commonly known as: Aldactone; Take 1 tablet   (25 mg) by mouth once daily.; What changed: how much to take   torsemide 10 mg tablet; Commonly known as: Demadex; Take 2 tablets (20   mg) by mouth once daily. May also take 2 tablets (20 mg) once daily as   needed (for weight gain of 3 pounds in 3 days).; What changed: medication   strength, See the new instructions.     CONTINUE taking these medications     aspirin 81 mg EC tablet   clopidogrel 75 mg tablet; Commonly known as: Plavix; Take 1 tablet (75   mg) by mouth once daily.   lisinopril 10 mg tablet; Take 1 tablet (10 mg) by mouth once daily. Take   first dose 36 hours after your last dose of entresto   MULTIPLE VITAMINS ORAL   rosuvastatin 20 mg tablet; Commonly known as: Crestor; Take 1 tablet (20   mg) by mouth once daily.   tiotropium 2.5 mcg/actuation inhaler; Commonly known as: Spiriva   Respimat; Inhale 2 puffs once daily.       Test Results Pending At Discharge  Pending Labs       No current pending labs.            Hospital Course  Wojciech Kern is a 74 y.o.  "male with PMHx of ICM/HFrEF (EF 26% 12/2024) s/p ICD (2018), CAD s/p HELEN x4 to RCA c/b VSD s/p CABG x1 (SVG-LAD and VSD repair in 2015), HTN, DLD, Emphysema, CKD3b presenting as a transfer from [facility] ED to  CICU with ICD shocks.     He states he was out shoveling snow earlier this morning (1/5/24) and started feeling unwell. He was feeling short of breath and chest soreness 3/10, nonradiating, in the center of his chest. He then went inside his house and sat down to rest when he felt his ICD fire twice. He took a sl nitroglycerin and his chest pain resolved shortly after. He has never felt his ICD fire before. He states he usually walks daily and goes to the gym and has never felt chest soreness/sob. States he has been complaint with his home meds. He called 911 and EMS noted patient was still in Vtach. He received amiodarone en route which converted him to sinus rhythm.     Upon arrival to [facility] CICU, pt denies chest pain, palpitations, sob. He was admitted for EP evaluation and ischemic evaluation. On admission he had an SPENCER, likely I/s/o ischemia which was improving after gradual fluid administration (500 ml/day administered slowly). HR was controlled on lidocaine drip and EP saw the patient, device interrogation notable for \" wide complex regular tachycardia that starts at a rate of approx 150bpm, but accelerates later with a change in morphology concerning for VF\". EP recommended beta blocker upon discharge. Post-LHC, patient had frequent PVCs, amiodarone was started.    LHC via R femoral access on 1/8 showed no significant coronary artery disease in native LAD; calcific proximal circumflex lesion, IVUS assisted PCI of the Prox Lcx with HELEN. RCA without significant CAD. Patient had concern for hematoma of R groin post removal of arterial sheath; on post procedure day 0, on day of discharge there was no visible or palpable hematoma.    Pertinent Physical Exam At Time of Discharge  Physical Exam  General: Awake, " alert, in no acute distress, conversant  HEENT: Normocephalic, atraumatic  CV: Heart with regular rate and rhythm, no murmurs appreciated, no JVD  Lungs: Clear to auscultation bilaterally with no wheezes, crackles, or rhonchi  GI: Soft, nontender, nondistended  Extremities: 2+ pulses bilaterally, lower extremities warm and well perfused. R femoral access site visualized without bruising or palpable hematoma.  Neuro: Moves all extremities equally, strength 5/5 bilaterally    Outpatient Follow-Up  Future Appointments   Date Time Provider Department Center   1/10/2025 11:00 AM SHANNON Bray-CNP [id number] [facility]   3/11/2025 11:30 AM Helio Sparks MD [id number] [facility]   7/3/2025  3:00 PM Viviana Mccray MD [id number] [facility]     Patient was seen and discussed with attending Dr. Tony Smith MD

## 2025-01-10 NOTE — PROGRESS NOTES
Discharge Facility: [facility]   Discharge Diagnosis: VT (ventricular tachycardia) (Multi) , NSTEMI  Admission Date: 1/5/25  Discharge Date: 1/9/25    PCP Appointment Date: no appt, message to office  Specialist Appointment Date: 1/28/25 cardiology  Hospital Encounter and Summary Linked: Yes  Two attempts were made to reach patient within two business days after discharge. Voicemail left with contact information for patient to call back with any non-emergent questions or concerns if able.

## 2025-01-12 ENCOUNTER — HOME CARE VISIT (OUTPATIENT)
Dept: HOME HEALTH SERVICES | Facility: HOME HEALTH | Age: 75
End: 2025-01-12

## 2025-01-13 ENCOUNTER — APPOINTMENT (OUTPATIENT)
Dept: PULMONOLOGY | Facility: CLINIC | Age: 75
End: 2025-01-13
Payer: MEDICARE

## 2025-01-13 LAB
ATRIAL RATE: 58 BPM
ATRIAL RATE: 71 BPM
ATRIAL RATE: 77 BPM
P AXIS: 40 DEGREES
P AXIS: 46 DEGREES
P AXIS: 51 DEGREES
P OFFSET: 169 MS
P OFFSET: 174 MS
P OFFSET: 180 MS
P ONSET: 108 MS
P ONSET: 113 MS
P ONSET: 119 MS
PR INTERVAL: 202 MS
PR INTERVAL: 204 MS
PR INTERVAL: 212 MS
Q ONSET: 214 MS
Q ONSET: 215 MS
Q ONSET: 220 MS
QRS COUNT: 11 BEATS
QRS COUNT: 13 BEATS
QRS COUNT: 9 BEATS
QRS DURATION: 118 MS
QRS DURATION: 118 MS
QRS DURATION: 120 MS
QT INTERVAL: 418 MS
QT INTERVAL: 432 MS
QT INTERVAL: 460 MS
QTC CALCULATION(BAZETT): 451 MS
QTC CALCULATION(BAZETT): 469 MS
QTC CALCULATION(BAZETT): 473 MS
QTC FREDERICIA: 454 MS
QTC FREDERICIA: 454 MS
QTC FREDERICIA: 457 MS
R AXIS: -12 DEGREES
R AXIS: -6 DEGREES
R AXIS: 10 DEGREES
T AXIS: 164 DEGREES
T AXIS: 192 DEGREES
T AXIS: 253 DEGREES
T OFFSET: 429 MS
T OFFSET: 430 MS
T OFFSET: 445 MS
VENTRICULAR RATE: 58 BPM
VENTRICULAR RATE: 71 BPM
VENTRICULAR RATE: 77 BPM

## 2025-01-17 LAB
ATRIAL RATE: 49 BPM
ATRIAL RATE: 60 BPM
ATRIAL RATE: 67 BPM
P AXIS: 25 DEGREES
P AXIS: 34 DEGREES
P AXIS: 46 DEGREES
P OFFSET: 176 MS
P OFFSET: 178 MS
P OFFSET: 182 MS
P ONSET: 114 MS
P ONSET: 115 MS
P ONSET: 117 MS
PR INTERVAL: 196 MS
PR INTERVAL: 200 MS
PR INTERVAL: 200 MS
Q ONSET: 213 MS
Q ONSET: 214 MS
Q ONSET: 217 MS
QRS COUNT: 10 BEATS
QRS COUNT: 11 BEATS
QRS COUNT: 8 BEATS
QRS DURATION: 114 MS
QRS DURATION: 116 MS
QRS DURATION: 116 MS
QT INTERVAL: 446 MS
QT INTERVAL: 472 MS
QT INTERVAL: 480 MS
QTC CALCULATION(BAZETT): 426 MS
QTC CALCULATION(BAZETT): 471 MS
QTC CALCULATION(BAZETT): 480 MS
QTC FREDERICIA: 441 MS
QTC FREDERICIA: 463 MS
QTC FREDERICIA: 480 MS
R AXIS: -18 DEGREES
R AXIS: -25 DEGREES
R AXIS: -26 DEGREES
T AXIS: -65 DEGREES
T AXIS: 181 DEGREES
T AXIS: 261 DEGREES
T OFFSET: 436 MS
T OFFSET: 453 MS
T OFFSET: 454 MS
VENTRICULAR RATE: 49 BPM
VENTRICULAR RATE: 60 BPM
VENTRICULAR RATE: 67 BPM

## 2025-01-22 ENCOUNTER — PATIENT OUTREACH (OUTPATIENT)
Dept: PRIMARY CARE | Facility: CLINIC | Age: 75
End: 2025-01-22
Payer: MEDICARE

## 2025-01-28 ENCOUNTER — OFFICE VISIT (OUTPATIENT)
Dept: CARDIOLOGY | Facility: HOSPITAL | Age: 75
End: 2025-01-28
Payer: MEDICARE

## 2025-01-28 VITALS
BODY MASS INDEX: 22.36 KG/M2 | HEIGHT: 69 IN | HEART RATE: 55 BPM | OXYGEN SATURATION: 97 % | DIASTOLIC BLOOD PRESSURE: 55 MMHG | WEIGHT: 151 LBS | SYSTOLIC BLOOD PRESSURE: 133 MMHG

## 2025-01-28 DIAGNOSIS — I50.21 ACUTE SYSTOLIC (CONGESTIVE) HEART FAILURE: ICD-10-CM

## 2025-01-28 DIAGNOSIS — E78.5 DYSLIPIDEMIA: ICD-10-CM

## 2025-01-28 DIAGNOSIS — I25.10 CORONARY ARTERY DISEASE INVOLVING NATIVE HEART WITHOUT ANGINA PECTORIS, UNSPECIFIED VESSEL OR LESION TYPE: Chronic | ICD-10-CM

## 2025-01-28 DIAGNOSIS — I47.20 VT (VENTRICULAR TACHYCARDIA) (MULTI): ICD-10-CM

## 2025-01-28 DIAGNOSIS — I10 BENIGN ESSENTIAL HYPERTENSION: Primary | Chronic | ICD-10-CM

## 2025-01-28 LAB
ATRIAL RATE: 44 BPM
P AXIS: 24 DEGREES
P OFFSET: 187 MS
P ONSET: 129 MS
PR INTERVAL: 176 MS
Q ONSET: 217 MS
QRS COUNT: 7 BEATS
QRS DURATION: 116 MS
QT INTERVAL: 510 MS
QTC CALCULATION(BAZETT): 436 MS
QTC FREDERICIA: 459 MS
R AXIS: -21 DEGREES
T AXIS: -89 DEGREES
T OFFSET: 472 MS
VENTRICULAR RATE: 44 BPM

## 2025-01-28 PROCEDURE — 1160F RVW MEDS BY RX/DR IN RCRD: CPT | Performed by: NURSE PRACTITIONER

## 2025-01-28 PROCEDURE — 1157F ADVNC CARE PLAN IN RCRD: CPT | Performed by: NURSE PRACTITIONER

## 2025-01-28 PROCEDURE — 93010 ELECTROCARDIOGRAM REPORT: CPT | Performed by: INTERNAL MEDICINE

## 2025-01-28 PROCEDURE — 99215 OFFICE O/P EST HI 40 MIN: CPT | Performed by: NURSE PRACTITIONER

## 2025-01-28 PROCEDURE — 1111F DSCHRG MED/CURRENT MED MERGE: CPT | Performed by: NURSE PRACTITIONER

## 2025-01-28 PROCEDURE — 3078F DIAST BP <80 MM HG: CPT | Performed by: NURSE PRACTITIONER

## 2025-01-28 PROCEDURE — 1123F ACP DISCUSS/DSCN MKR DOCD: CPT | Performed by: NURSE PRACTITIONER

## 2025-01-28 PROCEDURE — 93005 ELECTROCARDIOGRAM TRACING: CPT | Performed by: NURSE PRACTITIONER

## 2025-01-28 PROCEDURE — 1159F MED LIST DOCD IN RCRD: CPT | Performed by: NURSE PRACTITIONER

## 2025-01-28 PROCEDURE — 1036F TOBACCO NON-USER: CPT | Performed by: NURSE PRACTITIONER

## 2025-01-28 PROCEDURE — 3075F SYST BP GE 130 - 139MM HG: CPT | Performed by: NURSE PRACTITIONER

## 2025-01-28 RX ORDER — SPIRONOLACTONE 25 MG/1
25 TABLET ORAL DAILY
Qty: 90 TABLET | Refills: 3 | Status: SHIPPED | OUTPATIENT
Start: 2025-01-28 | End: 2026-01-23

## 2025-01-28 RX ORDER — ROSUVASTATIN CALCIUM 20 MG/1
20 TABLET, COATED ORAL DAILY
Qty: 90 TABLET | Refills: 3 | Status: SHIPPED | OUTPATIENT
Start: 2025-01-28

## 2025-01-28 RX ORDER — AMIODARONE HYDROCHLORIDE 200 MG/1
200 TABLET ORAL DAILY
Qty: 90 TABLET | Refills: 3 | Status: SHIPPED | OUTPATIENT
Start: 2025-01-28 | End: 2026-01-23

## 2025-01-28 RX ORDER — TORSEMIDE 20 MG/1
20 TABLET ORAL DAILY
Qty: 90 TABLET | Refills: 3 | Status: SHIPPED | OUTPATIENT
Start: 2025-01-28 | End: 2026-01-23

## 2025-01-28 RX ORDER — ROSUVASTATIN CALCIUM 20 MG/1
20 TABLET, COATED ORAL DAILY
Qty: 90 TABLET | Refills: 3 | Status: SHIPPED | OUTPATIENT
Start: 2025-01-28 | End: 2025-01-28 | Stop reason: SDUPTHER

## 2025-01-28 NOTE — PROGRESS NOTES
Subjective   Wojciech Kern is a 74 y.o. male.    Chief Complaint:  Hypertension, Hyperlipidemia, and Coronary Artery Disease    Mr. Kern returns for a hospital follow up. He presented to [facility] ED 1/5/25 for ICD shocks and was eventually transferred to [facility]. He felt his ICD fire twice while shoveling snow and was found to be in VT by EMS. He was started on a lidocaine drip for possible VF in the setting of NSTEMI. He had a heart catheterization 1/8/25 s/p PCI to ostial Lcx. Upon discharge he was started on amiodarone, jardiance and metoprolol though he was previously intolerant to metoprolol due to severe bradycardia and jardiance was too expensive. His torsemide was also decreased to 40 mg daily due to SPENCER. He has been feeling fairly well since discharge, though does remain somewhat weak and fatigued. He denies any further ICD firings. He offers no new cardiovascular complaints or concerns today. He denies any complaints of chest pain, shortness of breath, lightheadedness, dizziness, palpitations, syncope, orthopnea, paroxysmal nocturnal dyspnea, lower extremity swelling or bleeding concerns.        Review of Systems   All other systems reviewed and are negative.      Objective   Physical Exam  Constitutional:       Appearance: Healthy appearance. In no distress  Pulmonary:      Effort: Pulmonary effort is normal.      Breath sounds: Normal breath sounds.   Cardiovascular:      Normal rate. bradycardia rhythm. Normal S1. Normal S2.       Murmurs: There is no murmur.      Carotids: right carotid pulse +2, no bruit heard over the right carotid. left carotid pulse +2, no bruit heard over the left carotid.  Pacemaker/ICD Implant site has healed without signs of infection.  Edema:     Peripheral edema absent.   Abdominal:      Palpations: Abdomen is soft.   Musculoskeletal:       Cervical back: Normal range of motion.   Skin:     General: Skin is warm and dry. Normal color and pigmentation   Neurological:      Mental  Status: Alert and oriented to person, place and time.   Psychiatric:     Mood and Affect: appropriate mood and appropriate affect.     EKG obtained and reviewed. Sinus bradycardia. HR 44      Lab Review:   Lab Results   Component Value Date     01/09/2025    K 4.4 01/09/2025     01/09/2025    CO2 26 01/09/2025    BUN 46 (H) 01/09/2025    CREATININE 1.83 (H) 01/09/2025    GLUCOSE 101 (H) 01/09/2025    CALCIUM 9.0 01/09/2025     Lab Results   Component Value Date    WBC 6.6 01/09/2025    HGB 9.9 (L) 01/09/2025    HCT 27.5 (L) 01/09/2025    MCV 90 01/09/2025     01/09/2025     Lab Results   Component Value Date    CHOL 101 01/08/2025    TRIG 33 01/08/2025    HDL 49.9 01/08/2025       Assessment/Plan   Mr. 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 Echocardiogram 1/2025 showed an EF of 25-30%, stable and unchanged from prior studies. He presents today following a recent hospitalization for ICD firings and NSTEMI s/p PCI to ostial Lcx. His BP remains stable, though his EKG shows sinus bradycardia, HR 44 since restarting metoprolol. In order to prevent further bradycardia, which he has had before with beta blockers, I will have him stop metoprolol. He can also finish jardiance, then stop it as well due to cost. He will continue all other medications unchanged. I will have him make an appointment to see Dr. Peña to discuss ICD upgrade/change out, and follow up with us in clinic in 2 months. He knows to call for any concerns.

## 2025-01-28 NOTE — PATIENT INSTRUCTIONS
Stop metoprolol     Finish jardiance then stop     Take torsemide 20 mg once daily     Make an appointment to see Dr. Peña     Follow up in 2 months     Call for any concerns

## 2025-03-04 ENCOUNTER — OFFICE VISIT (OUTPATIENT)
Dept: CARDIOLOGY | Facility: HOSPITAL | Age: 75
End: 2025-03-04
Payer: MEDICARE

## 2025-03-04 VITALS
SYSTOLIC BLOOD PRESSURE: 134 MMHG | HEIGHT: 68 IN | DIASTOLIC BLOOD PRESSURE: 64 MMHG | BODY MASS INDEX: 22.73 KG/M2 | RESPIRATION RATE: 16 BRPM | HEART RATE: 63 BPM | WEIGHT: 150 LBS | OXYGEN SATURATION: 98 %

## 2025-03-04 DIAGNOSIS — Z01.818 PREOP TESTING: ICD-10-CM

## 2025-03-04 DIAGNOSIS — I47.20 VT (VENTRICULAR TACHYCARDIA) (MULTI): Primary | ICD-10-CM

## 2025-03-04 LAB
ATRIAL RATE: 63 BPM
P AXIS: 42 DEGREES
P OFFSET: 187 MS
P ONSET: 125 MS
PR INTERVAL: 184 MS
Q ONSET: 217 MS
QRS COUNT: 11 BEATS
QRS DURATION: 112 MS
QT INTERVAL: 438 MS
QTC CALCULATION(BAZETT): 448 MS
QTC FREDERICIA: 445 MS
R AXIS: 17 DEGREES
T AXIS: -61 DEGREES
T OFFSET: 436 MS
VENTRICULAR RATE: 63 BPM

## 2025-03-04 PROCEDURE — 99214 OFFICE O/P EST MOD 30 MIN: CPT | Performed by: INTERNAL MEDICINE

## 2025-03-04 PROCEDURE — 93010 ELECTROCARDIOGRAM REPORT: CPT | Performed by: INTERNAL MEDICINE

## 2025-03-04 PROCEDURE — 1157F ADVNC CARE PLAN IN RCRD: CPT | Performed by: INTERNAL MEDICINE

## 2025-03-04 PROCEDURE — 93005 ELECTROCARDIOGRAM TRACING: CPT | Performed by: INTERNAL MEDICINE

## 2025-03-04 PROCEDURE — 3078F DIAST BP <80 MM HG: CPT | Performed by: INTERNAL MEDICINE

## 2025-03-04 PROCEDURE — 1123F ACP DISCUSS/DSCN MKR DOCD: CPT | Performed by: INTERNAL MEDICINE

## 2025-03-04 PROCEDURE — 1036F TOBACCO NON-USER: CPT | Performed by: INTERNAL MEDICINE

## 2025-03-04 PROCEDURE — 3075F SYST BP GE 130 - 139MM HG: CPT | Performed by: INTERNAL MEDICINE

## 2025-03-04 PROCEDURE — 1159F MED LIST DOCD IN RCRD: CPT | Performed by: INTERNAL MEDICINE

## 2025-03-04 ASSESSMENT — ENCOUNTER SYMPTOMS
LOSS OF SENSATION IN FEET: 0
DEPRESSION: 0
OCCASIONAL FEELINGS OF UNSTEADINESS: 0

## 2025-03-04 NOTE — PROGRESS NOTES
Referred by Corey Black MD provider found for   Chief Complaint   Patient presents with    Ventricular Tachycardia        Wojciech Kern is a 74 y.o. year old male patient with h/o ICM/HFrEF (EF 26% 12/2024) s/p sqICD (2018), CAD s/p HELEN x4 to RCA c/b VSD s/p CABG x1 (SVG-LAD and VSD repair in 2015), HTN, COPD, CKD3b. Was recently admitted to the Hospital with a NSTEMI complicated with VT/VF that resulted in device shocks. He underwent PCI to the circumflex artery. During the admission it was attempted to have him loaded with metoprolol although it resulted in bradycardia and the medication was held. Presents today for evaluation for his device change out.     PMHx/PSHx: As above    FamHx: unremarkable     Allergies:  Allergies   Allergen Reactions    Penicillins Anaphylaxis and Hives    Sulfa (Sulfonamide Antibiotics) Hives        Review of Systems    Constitutional: not feeling tired.   Eyes: no eyesight problems.   ENT: no hearing loss and no nosebleeds.   Cardiovascular: no intermittent leg claudication and as noted in HPI.   Respiratory: no chronic cough and no shortness of breath.   Gastrointestinal: no change in bowel habits and no blood in stools.   Genitourinary: no urinary frequency and no hematuria.   Skin: no skin rashes.   Neurological: no seizures and no frequent falls.   Psychiatric: no depression and not suicidal.   All other systems have been reviewed and are negative for complaint.     Outpatient Medications:  Current Outpatient Medications   Medication Instructions    amiodarone (PACERONE) 200 mg, oral, Daily    aspirin 81 mg EC tablet 1 tablet, Daily    clopidogrel (PLAVIX) 75 mg, oral, Daily    lisinopril 10 mg, oral, Daily, Take first dose 36 hours after your last dose of entresto    multivitamin (MULTIPLE VITAMINS ORAL) 1 tablet, Daily    rosuvastatin (CRESTOR) 20 mg, oral, Daily    spironolactone (ALDACTONE) 25 mg, oral, Daily    tiotropium (Spiriva Respimat) 2.5 mcg/actuation inhaler  "2 puffs, inhalation, Daily RT    torsemide (DEMADEX) 20 mg, oral, Daily         Last Recorded Vitals:      1/9/2025     1:00 PM 1/9/2025     2:00 PM 1/9/2025     3:00 PM 1/9/2025     3:41 PM 1/9/2025     4:00 PM 1/28/2025    12:51 PM 3/4/2025     3:07 PM   Vitals   Systolic 128 100 108  116 133 134   Diastolic 65 88 72  66 55 64   BP Location     Left arm Left arm Right arm   Heart Rate 58 56 54  56 55 63   Temp    36.3 °C (97.3 °F)      Resp 22 26 21  20  16   Height      1.753 m (5' 9\") 1.727 m (5' 8\")   Weight (lb)      151 150   BMI      22.3 kg/m2 22.81 kg/m2   BSA (m2)      1.83 m2 1.81 m2   Visit Report      Report Report    Visit Vitals  /64 (BP Location: Right arm, Patient Position: Sitting, BP Cuff Size: Adult)   Pulse 63   Resp 16   Ht 1.727 m (5' 8\")   Wt 68 kg (150 lb)   SpO2 98%   BMI 22.81 kg/m²   Smoking Status Former   BSA 1.81 m²        Physical Exam:  Constitutional: alert and in no acute distress.   Eyes: no erythema, swelling or discharge from the eye .   Neck: neck is supple, symmetric, trachea midline, no masses  and no thyromegaly .   Pulmonary: no increased work of breathing or signs of respiratory distress  and lungs clear to auscultation.    Cardiovascular: carotid pulses 2+ bilaterally with no bruit , JVP was normal, no thrills , regular rhythm, normal S1 and S2, no murmurs , pedal pulses 2+ bilaterally  and no edema .   Abdomen: abdomen non-tender, no masses  and no hepatomegaly .   Skin: skin warm and dry, normal skin turgor .   Psychiatric judgment and insight is normal  and oriented to person, place and time .        Assessment/Plan   Problem List Items Addressed This Visit             ICD-10-CM    VT (ventricular tachycardia) (Multi) - Primary I47.20    Relevant Orders    ECG 12 lead (Clinic Performed) (Completed)    Basic Metabolic Panel    CBC     Other Visit Diagnoses         Codes    Preop testing     Z01.818    Relevant Orders    Basic Metabolic Panel    CBC      "       Wojciech Kern is a 74 y.o. year old male patient with h/o ICM/HFrEF (EF 26% 12/2024) s/p sqICD (2018), CAD s/p HELEN x4 to RCA c/b VSD s/p CABG x1 (SVG-LAD and VSD repair in 2015), HTN, COPD, CKD3b. Was recently admitted to the Hospital with a NSTEMI complicated with VT/VF that resulted in device shocks. He underwent PCI to the circumflex artery. During the admission it was attempted to have him loaded with metoprolol although it resulted in bradycardia and the medication was held. Presents today for evaluation for his device change out.   His current ECG shows NSR with narrow QRS and HR of 63 bpm. The patientreports being asymptomatic. Last device check in early January showed a normal device function and remaining 20% battery. At this point his device did not reach SANTOSH. The other questions is the need for pacing in which case his device would need to be replaced for a DDD/ICD. I will forward this question to his cardiologist. If there is absolute need to have him on beta blocers then will need to explant the subQ ICD and implant a transvenous DDD/ICD. Otherwise, if the patient does not need the metoprolol, then once his device gets SANTOSH will proceed with device change out.         Corey Peña MD  Cardiac Electrophysiology      Thank you very much for allowing me to participate in the care of this pleasant patient. Please do not hesitate to contact me with any further questions or concerns regarding his care.    **Disclaimer: This note was dictated by speech recognition, and every effort has been made to prevent any error in transcription, however minor errors may be present**

## 2025-03-07 ENCOUNTER — HOSPITAL ENCOUNTER (OUTPATIENT)
Dept: CARDIOLOGY | Facility: CLINIC | Age: 75
Discharge: HOME | End: 2025-03-07
Payer: MEDICARE

## 2025-03-07 DIAGNOSIS — I49.01 VF (VENTRICULAR FIBRILLATION) (MULTI): ICD-10-CM

## 2025-03-11 ENCOUNTER — APPOINTMENT (OUTPATIENT)
Dept: PRIMARY CARE | Facility: CLINIC | Age: 75
End: 2025-03-11
Payer: MEDICARE

## 2025-03-11 ENCOUNTER — HOSPITAL ENCOUNTER (OUTPATIENT)
Dept: CARDIOLOGY | Facility: CLINIC | Age: 75
Discharge: HOME | End: 2025-03-11
Payer: MEDICARE

## 2025-03-11 VITALS
DIASTOLIC BLOOD PRESSURE: 66 MMHG | HEIGHT: 69 IN | WEIGHT: 147 LBS | RESPIRATION RATE: 12 BRPM | OXYGEN SATURATION: 99 % | BODY MASS INDEX: 21.77 KG/M2 | SYSTOLIC BLOOD PRESSURE: 114 MMHG | HEART RATE: 54 BPM

## 2025-03-11 DIAGNOSIS — J43.9 PULMONARY EMPHYSEMA, UNSPECIFIED EMPHYSEMA TYPE (MULTI): Chronic | ICD-10-CM

## 2025-03-11 DIAGNOSIS — Z95.810 AICD (AUTOMATIC CARDIOVERTER/DEFIBRILLATOR) PRESENT: Chronic | ICD-10-CM

## 2025-03-11 DIAGNOSIS — K44.9 HIATAL HERNIA: ICD-10-CM

## 2025-03-11 DIAGNOSIS — N18.32 CHRONIC RENAL IMPAIRMENT, STAGE 3B (MULTI): ICD-10-CM

## 2025-03-11 DIAGNOSIS — I50.20 HFREF (HEART FAILURE WITH REDUCED EJECTION FRACTION): Chronic | ICD-10-CM

## 2025-03-11 DIAGNOSIS — G60.9 IDIOPATHIC PERIPHERAL NEUROPATHY: ICD-10-CM

## 2025-03-11 DIAGNOSIS — I49.01 VF (VENTRICULAR FIBRILLATION) (MULTI): ICD-10-CM

## 2025-03-11 DIAGNOSIS — I34.0 NON-RHEUMATIC MITRAL REGURGITATION: ICD-10-CM

## 2025-03-11 DIAGNOSIS — N40.1 BPH ASSOCIATED WITH NOCTURIA: ICD-10-CM

## 2025-03-11 DIAGNOSIS — I10 BENIGN ESSENTIAL HYPERTENSION: Chronic | ICD-10-CM

## 2025-03-11 DIAGNOSIS — R35.1 BPH ASSOCIATED WITH NOCTURIA: ICD-10-CM

## 2025-03-11 DIAGNOSIS — E78.5 DYSLIPIDEMIA: ICD-10-CM

## 2025-03-11 DIAGNOSIS — K21.9 GASTROESOPHAGEAL REFLUX DISEASE, UNSPECIFIED WHETHER ESOPHAGITIS PRESENT: ICD-10-CM

## 2025-03-11 DIAGNOSIS — Z87.11 HISTORY OF PEPTIC ULCER: ICD-10-CM

## 2025-03-11 DIAGNOSIS — I25.10 CORONARY ARTERY DISEASE INVOLVING NATIVE HEART WITHOUT ANGINA PECTORIS, UNSPECIFIED VESSEL OR LESION TYPE: Primary | Chronic | ICD-10-CM

## 2025-03-11 PROBLEM — K25.9 GASTRIC ULCER: Status: RESOLVED | Noted: 2023-04-06 | Resolved: 2025-03-11

## 2025-03-11 PROBLEM — K27.7 PEPTIC ULCER, CHRONIC: Status: RESOLVED | Noted: 2023-04-06 | Resolved: 2025-03-11

## 2025-03-11 PROCEDURE — 3078F DIAST BP <80 MM HG: CPT | Performed by: FAMILY MEDICINE

## 2025-03-11 PROCEDURE — 3074F SYST BP LT 130 MM HG: CPT | Performed by: FAMILY MEDICINE

## 2025-03-11 PROCEDURE — G2211 COMPLEX E/M VISIT ADD ON: HCPCS | Performed by: FAMILY MEDICINE

## 2025-03-11 PROCEDURE — 1036F TOBACCO NON-USER: CPT | Performed by: FAMILY MEDICINE

## 2025-03-11 PROCEDURE — 1159F MED LIST DOCD IN RCRD: CPT | Performed by: FAMILY MEDICINE

## 2025-03-11 PROCEDURE — 99214 OFFICE O/P EST MOD 30 MIN: CPT | Performed by: FAMILY MEDICINE

## 2025-03-11 PROCEDURE — 1123F ACP DISCUSS/DSCN MKR DOCD: CPT | Performed by: FAMILY MEDICINE

## 2025-03-11 PROCEDURE — 1160F RVW MEDS BY RX/DR IN RCRD: CPT | Performed by: FAMILY MEDICINE

## 2025-03-11 PROCEDURE — 1157F ADVNC CARE PLAN IN RCRD: CPT | Performed by: FAMILY MEDICINE

## 2025-03-11 ASSESSMENT — ENCOUNTER SYMPTOMS
HEADACHES: 0
CHILLS: 0
NERVOUS/ANXIOUS: 0
ABDOMINAL PAIN: 0
SORE THROAT: 0
CHEST TIGHTNESS: 0
DYSPHORIC MOOD: 0
EYE PAIN: 0
EYE REDNESS: 0
DIFFICULTY URINATING: 0
ABDOMINAL DISTENTION: 0
APPETITE CHANGE: 0
WEAKNESS: 0
DIARRHEA: 0
BLOOD IN STOOL: 0
DIZZINESS: 0
DYSURIA: 0
FATIGUE: 0
ARTHRALGIAS: 0
CONSTIPATION: 0
FEVER: 0
BRUISES/BLEEDS EASILY: 0
BACK PAIN: 0
COUGH: 0
SHORTNESS OF BREATH: 0
ADENOPATHY: 0

## 2025-03-11 NOTE — PROGRESS NOTES
Subjective   Patient ID: Wojciech Kern is a 74 y.o. male who presents for Follow-up.  Pt has chronic CAD, systolic CHF, VAD, hx CABG/stents, and HTN. He had PTCA with stent in January  Pt is taking ASA, Lisinopril, Plavix, Torsemide, Lisinopril and Spironolactone . Tolerating well.  Exercising 5-7 days per week   Low sodium diet is being followed.   Is monitoring home blood pressures. Readings in good range.  Denies HA, vision changes or CP.     Pt has Dyslipidemia.   Lipid panel showed LDL 65.  Currently taking Crestor and is tolerating well without muscle pains or weakness.     GERD/hx PUD is well controlled on diet control only . Denies epigastric pain, nausea, heartburn or water brash.     Pt has stable CRI.  GFR 38. BP is controlled and pt is following low sodium diet.     BPH is well controlled without medication .  Pt has 1 x nightly nocturia. Urinary stream is good and he denies difficulty starting urination or emptying bladder.     Pt has chronic COPD. Taking Spiriva . Is not having symptoms of SOB and cough heightened from baseline. Quit smoking 1998.         Review of Systems   Constitutional:  Negative for appetite change, chills, fatigue and fever.   HENT:  Negative for congestion, hearing loss and sore throat.    Eyes:  Negative for pain, redness and visual disturbance.   Respiratory:  Negative for cough, chest tightness and shortness of breath.    Cardiovascular:  Negative for chest pain and leg swelling.   Gastrointestinal:  Negative for abdominal distention, abdominal pain, blood in stool, constipation and diarrhea.   Genitourinary:  Negative for difficulty urinating and dysuria.   Musculoskeletal:  Negative for arthralgias and back pain.   Skin:  Negative for rash.   Neurological:  Negative for dizziness, weakness and headaches.   Hematological:  Negative for adenopathy. Does not bruise/bleed easily.   Psychiatric/Behavioral:  Negative for dysphoric mood. The patient is not nervous/anxious.   "      Objective   /66   Pulse 54   Resp 12   Ht 1.753 m (5' 9\")   Wt 66.7 kg (147 lb)   SpO2 99%   BMI 21.71 kg/m²    Physical Exam  Constitutional:       General: He is not in acute distress.     Appearance: Normal appearance.   Cardiovascular:      Rate and Rhythm: Normal rate and regular rhythm.      Heart sounds: Normal heart sounds. No murmur heard.  Pulmonary:      Effort: Pulmonary effort is normal.      Breath sounds: Normal breath sounds.   Abdominal:      Palpations: Abdomen is soft.      Tenderness: There is no abdominal tenderness.   Neurological:      Mental Status: He is alert.   Psychiatric:         Mood and Affect: Mood normal.         Judgment: Judgment normal.           Assessment/Plan   Diagnoses and all orders for this visit:  Coronary artery disease /HFrEF (heart failure with reduced ejection fraction)/Mitral Regurgitation/ AICD (automatic cardioverter/defibrillator) present - stable post stenting from January admission. Continue to monitor with cardiology. Recommend confirming with them if Amiodarone is still needed.  Benign essential hypertension - very well controlled, continue current meds and monitor  Chronic renal impairment, stage 3b  - stable, monitoring with routinue labs  Dyslipidemia - very well controlled on statin, continue  Gastroesophageal reflux disease/Hiatal hernia/History of peptic ulcer -stable with diet control, last EGD looked clear. Will monitor.  BPH associated with nocturia - stable, monitor  Idiopathic peripheral neuropathy -stable, continue to monitor  Pulmonary emphysema - controlled with Spiriva, continue and monitor    Follow up in May for preventative.       "

## 2025-03-11 NOTE — PROGRESS NOTES
"Subjective   Patient ID: Wojciech Kern is a 74 y.o. male who presents for Follow-up.    HPI     Review of Systems    Objective   /66   Pulse 54   Resp 12   Ht 1.753 m (5' 9\")   Wt 66.7 kg (147 lb)   SpO2 99%   BMI 21.71 kg/m²     Physical Exam    Assessment/Plan          "

## 2025-03-18 DIAGNOSIS — Z01.818 PREOP TESTING: ICD-10-CM

## 2025-03-18 DIAGNOSIS — I47.20 VT (VENTRICULAR TACHYCARDIA) (MULTI): ICD-10-CM

## 2025-03-25 ENCOUNTER — OFFICE VISIT (OUTPATIENT)
Dept: CARDIOLOGY | Facility: HOSPITAL | Age: 75
End: 2025-03-25
Payer: MEDICARE

## 2025-03-25 VITALS
WEIGHT: 151.4 LBS | SYSTOLIC BLOOD PRESSURE: 118 MMHG | OXYGEN SATURATION: 96 % | BODY MASS INDEX: 22.42 KG/M2 | HEIGHT: 69 IN | HEART RATE: 71 BPM | DIASTOLIC BLOOD PRESSURE: 66 MMHG

## 2025-03-25 DIAGNOSIS — R42 POSTURAL DIZZINESS: ICD-10-CM

## 2025-03-25 DIAGNOSIS — I25.10 CORONARY ARTERY DISEASE INVOLVING NATIVE HEART WITHOUT ANGINA PECTORIS, UNSPECIFIED VESSEL OR LESION TYPE: ICD-10-CM

## 2025-03-25 DIAGNOSIS — I50.21 ACUTE SYSTOLIC (CONGESTIVE) HEART FAILURE: ICD-10-CM

## 2025-03-25 DIAGNOSIS — I50.20 HFREF (HEART FAILURE WITH REDUCED EJECTION FRACTION): Chronic | ICD-10-CM

## 2025-03-25 DIAGNOSIS — I47.20 VT (VENTRICULAR TACHYCARDIA) (MULTI): ICD-10-CM

## 2025-03-25 PROCEDURE — 99214 OFFICE O/P EST MOD 30 MIN: CPT | Performed by: NURSE PRACTITIONER

## 2025-03-25 PROCEDURE — 3078F DIAST BP <80 MM HG: CPT | Performed by: NURSE PRACTITIONER

## 2025-03-25 PROCEDURE — 1157F ADVNC CARE PLAN IN RCRD: CPT | Performed by: NURSE PRACTITIONER

## 2025-03-25 PROCEDURE — 1159F MED LIST DOCD IN RCRD: CPT | Performed by: NURSE PRACTITIONER

## 2025-03-25 PROCEDURE — 3074F SYST BP LT 130 MM HG: CPT | Performed by: NURSE PRACTITIONER

## 2025-03-25 PROCEDURE — 1036F TOBACCO NON-USER: CPT | Performed by: NURSE PRACTITIONER

## 2025-03-25 PROCEDURE — 1160F RVW MEDS BY RX/DR IN RCRD: CPT | Performed by: NURSE PRACTITIONER

## 2025-03-25 PROCEDURE — 1123F ACP DISCUSS/DSCN MKR DOCD: CPT | Performed by: NURSE PRACTITIONER

## 2025-03-25 RX ORDER — AMIODARONE HYDROCHLORIDE 200 MG/1
200 TABLET ORAL DAILY
Qty: 90 TABLET | Refills: 3 | Status: SHIPPED | OUTPATIENT
Start: 2025-03-25 | End: 2026-03-20

## 2025-03-25 RX ORDER — CLOPIDOGREL BISULFATE 75 MG/1
75 TABLET ORAL DAILY
Qty: 90 TABLET | Refills: 3 | Status: SHIPPED | OUTPATIENT
Start: 2025-03-25

## 2025-03-25 RX ORDER — LISINOPRIL 10 MG/1
10 TABLET ORAL DAILY
Qty: 90 TABLET | Refills: 3 | Status: SHIPPED | OUTPATIENT
Start: 2025-03-25 | End: 2026-03-25

## 2025-03-25 ASSESSMENT — ENCOUNTER SYMPTOMS
LOSS OF SENSATION IN FEET: 0
OCCASIONAL FEELINGS OF UNSTEADINESS: 0
DEPRESSION: 0

## 2025-03-25 NOTE — PROGRESS NOTES
Subjective   Wojciech Kern is a 74 y.o. male.    Chief Complaint:  Hypertension, Hyperlipidemia, and Coronary Artery Disease    Mr. Kern returns for a routine 2 month follow up. He has been feeling well from a cardiac standpoint. He has remained compliant with his medications, denying any intolerances. He was seen and evaluated by Dr. Peña to upgrade his ICD, though he has not reached SANTOSH yet. We also discussed upgrading his subcutaneous ICD to DDD/ICD in order to get him on metoprolol, though he has been doing well without it so we decided to hold off on this. He will be moving to [place] soon. He offers no new cardiovascular complaints or concerns today. He denies any complaints of chest pain, shortness of breath, lightheadedness, dizziness, palpitations, syncope, orthopnea, paroxysmal nocturnal dyspnea, lower extremity swelling or bleeding concerns.        Review of Systems   All other systems reviewed and are negative.      Objective   Physical Exam  Constitutional:       Appearance: Healthy appearance. In no distress  Pulmonary:      Effort: Pulmonary effort is normal.      Breath sounds: Normal breath sounds.   Cardiovascular:      Normal rate. Regular rhythm. Normal S1. Normal S2.       Murmurs: There is no murmur.      Carotids: right carotid pulse +2, no bruit heard over the right carotid. left carotid pulse +2, no bruit heard over the left carotid.  Pacemaker/ICD Implant site has healed without signs of infection.  Edema:     Peripheral edema absent.   Abdominal:      Palpations: Abdomen is soft.   Musculoskeletal:       Cervical back: Normal range of motion.   Skin:     General: Skin is warm and dry. Normal color and pigmentation   Neurological:      Mental Status: Alert and oriented to person, place and time.   Psychiatric:     Mood and Affect: appropriate mood and appropriate affect.     Lab Review:   Lab Results   Component Value Date     01/09/2025    K 4.4 01/09/2025     01/09/2025     CO2 26 01/09/2025    BUN 46 (H) 01/09/2025    CREATININE 1.83 (H) 01/09/2025    GLUCOSE 101 (H) 01/09/2025    CALCIUM 9.0 01/09/2025     Lab Results   Component Value Date    WBC 6.6 01/09/2025    HGB 9.9 (L) 01/09/2025    HCT 27.5 (L) 01/09/2025    MCV 90 01/09/2025     01/09/2025     Lab Results   Component Value Date    CHOL 101 01/08/2025    TRIG 33 01/08/2025    HDL 49.9 01/08/2025       Assessment/Plan   Mr. 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 He was also recently hospitalized for NSTEMI complicated by VT/VF that resulted in device shock, s/p PCI to ostial Lcx 1/2025. Echocardiogram 1/2025 showed an EF of 25-30%, stable and unchanged from prior studies. He presents today for routine follow up stable from a cardiac standpoint. His VS remain stable. I will have him continue all medications unchanged. He will be moving to [place] soon, and was encouraged to get set up with a cardiologist as soon as he can. I refilled his medications, and will also get a packet of information ready for him to take. He knows to call for any concerns.

## 2025-05-05 ENCOUNTER — APPOINTMENT (OUTPATIENT)
Dept: PRIMARY CARE | Facility: CLINIC | Age: 75
End: 2025-05-05
Payer: MEDICARE

## 2025-06-12 NOTE — PATIENT INSTRUCTIONS
Follow up in 4 months     Call for any concerns    Refilled meds (except Glipizide and Metformin).  Call or send message through [employer] when Glipizide and Metformin refills needed.  Follow up with clinical pharmacist for diabetes as directed.  Follow up with cardiology for blood pressure management as directed.    F/U 6 months: Annual wellness visit.

## 2025-06-17 ENCOUNTER — HOSPITAL ENCOUNTER (OUTPATIENT)
Dept: CARDIOLOGY | Facility: CLINIC | Age: 75
Discharge: HOME | End: 2025-06-17
Payer: MEDICARE

## 2025-06-17 DIAGNOSIS — I49.01 VF (VENTRICULAR FIBRILLATION) (MULTI): ICD-10-CM

## 2025-06-17 PROCEDURE — 93296 REM INTERROG EVL PM/IDS: CPT

## 2025-06-25 ENCOUNTER — HOSPITAL ENCOUNTER (OUTPATIENT)
Dept: CARDIOLOGY | Facility: CLINIC | Age: 75
Discharge: HOME | End: 2025-06-25
Payer: MEDICARE

## 2025-06-25 DIAGNOSIS — I49.01 VF (VENTRICULAR FIBRILLATION) (MULTI): ICD-10-CM

## 2025-07-03 ENCOUNTER — APPOINTMENT (OUTPATIENT)
Dept: PULMONOLOGY | Facility: CLINIC | Age: 75
End: 2025-07-03
Payer: MEDICARE

## 2025-08-28 ENCOUNTER — PATIENT OUTREACH (OUTPATIENT)
Dept: PRIMARY CARE | Facility: CLINIC | Age: 75
End: 2025-08-28

## 2025-09-02 ENCOUNTER — TELEPHONE (OUTPATIENT)
Dept: PRIMARY CARE | Facility: CLINIC | Age: 75
End: 2025-09-02

## 2025-10-02 ENCOUNTER — APPOINTMENT (OUTPATIENT)
Dept: PULMONOLOGY | Facility: CLINIC | Age: 75
End: 2025-10-02
Payer: MEDICARE

## (undated) DEVICE — SHEATH, GLIDESHEATH, SLENDER, 6FR 10CM

## (undated) DEVICE — SHEATH, PINNACLE, 10 CM,  6FR INTRODUCER, 6FR DIA, 2.5 CM DIALATOR

## (undated) DEVICE — INFLATION KIT, ADVANTAGE, ENCORE 26 (1/BOX)

## (undated) DEVICE — CATHETER, EAGLE EYE, PLATNIUM (IVUS)

## (undated) DEVICE — CATHETER, ANGIO, IMPULSE, FL4, 6 FR X 100 CM

## (undated) DEVICE — CATHETER, ANGIO, IMPULSE, FR4, 6 FR X 100 CM

## (undated) DEVICE — ACCESS KIT, S-MAK MINI, 4FR 10CM 0.018IN 40CM, NT/PT, ECHO ENHANCE NEEDLE

## (undated) DEVICE — GUIDEWIRE, INQUIRE, J TIP, .035 X 210CM, FIXED CORE, DIAGNOSTIC

## (undated) DEVICE — VALVE, HEMO, GUARDIAN II, W/GUIDEWIRE INSERTION TOOL & TORQUE

## (undated) DEVICE — CATHETER, ANGIO, IMPULSE, PIG 155, 6 FR X 110 CM

## (undated) DEVICE — GUIDEWIRE, STRAIGHT, HI-TORQUE BALANCE MIDDLEWEIGHT, 0.014 IN X 190 CM, HYDROCOAT

## (undated) DEVICE — CATHETER, OPTITORQUE, 5FR, JACKY, 3.5/ 2H/110CM, CURVED

## (undated) DEVICE — CATHETER, BALLOON, NC EUPHORA NONCOMPLIANT 3.0 X 15 X 142CM

## (undated) DEVICE — GUIDEWIRE, INQWIRE, 3MM J, .035 X 210CM, FIXED

## (undated) DEVICE — CATHETER, GUIDING, LAUNCHER, 6FR EBU 3.5

## (undated) DEVICE — CATHETER, BALLOON DILATION, EUPHORA SEMICOMPLIANT 2.50  X 15 MM X 142CM